# Patient Record
Sex: MALE | Employment: OTHER | URBAN - METROPOLITAN AREA
[De-identification: names, ages, dates, MRNs, and addresses within clinical notes are randomized per-mention and may not be internally consistent; named-entity substitution may affect disease eponyms.]

---

## 2019-01-01 ENCOUNTER — ANESTHESIA EVENT (OUTPATIENT)
Dept: SURGERY | Age: 84
DRG: 470 | End: 2019-01-01
Payer: MEDICARE

## 2019-01-01 ENCOUNTER — APPOINTMENT (OUTPATIENT)
Dept: GENERAL RADIOLOGY | Age: 84
DRG: 466 | End: 2019-01-01
Attending: STUDENT IN AN ORGANIZED HEALTH CARE EDUCATION/TRAINING PROGRAM
Payer: MEDICARE

## 2019-01-01 ENCOUNTER — HOME CARE VISIT (OUTPATIENT)
Dept: HOSPICE | Facility: HOSPICE | Age: 84
End: 2019-01-01
Payer: MEDICARE

## 2019-01-01 ENCOUNTER — APPOINTMENT (OUTPATIENT)
Dept: GENERAL RADIOLOGY | Age: 84
DRG: 466 | End: 2019-01-01
Attending: EMERGENCY MEDICINE
Payer: MEDICARE

## 2019-01-01 ENCOUNTER — APPOINTMENT (OUTPATIENT)
Dept: ULTRASOUND IMAGING | Age: 84
DRG: 466 | End: 2019-01-01
Attending: INTERNAL MEDICINE
Payer: MEDICARE

## 2019-01-01 ENCOUNTER — APPOINTMENT (OUTPATIENT)
Dept: GENERAL RADIOLOGY | Age: 84
DRG: 470 | End: 2019-01-01
Attending: ORTHOPAEDIC SURGERY
Payer: MEDICARE

## 2019-01-01 ENCOUNTER — HOSPITAL ENCOUNTER (INPATIENT)
Age: 84
LOS: 13 days | Discharge: NURSING, REHAB OR LONG-TERM CARE | DRG: 466 | End: 2019-10-19
Attending: EMERGENCY MEDICINE | Admitting: STUDENT IN AN ORGANIZED HEALTH CARE EDUCATION/TRAINING PROGRAM
Payer: MEDICARE

## 2019-01-01 ENCOUNTER — HOSPITAL ENCOUNTER (INPATIENT)
Age: 84
LOS: 7 days | Discharge: NURSING, REHAB OR LONG-TERM CARE | DRG: 470 | End: 2019-09-10
Attending: EMERGENCY MEDICINE | Admitting: FAMILY MEDICINE
Payer: MEDICARE

## 2019-01-01 ENCOUNTER — APPOINTMENT (OUTPATIENT)
Dept: GENERAL RADIOLOGY | Age: 84
DRG: 470 | End: 2019-01-01
Attending: PHYSICIAN ASSISTANT
Payer: MEDICARE

## 2019-01-01 ENCOUNTER — HOSPICE ADMISSION (OUTPATIENT)
Dept: HOSPICE | Facility: HOSPICE | Age: 84
End: 2019-01-01
Payer: MEDICARE

## 2019-01-01 ENCOUNTER — APPOINTMENT (OUTPATIENT)
Dept: GENERAL RADIOLOGY | Age: 84
DRG: 466 | End: 2019-01-01
Attending: PHYSICIAN ASSISTANT
Payer: MEDICARE

## 2019-01-01 ENCOUNTER — ANESTHESIA (OUTPATIENT)
Dept: SURGERY | Age: 84
DRG: 470 | End: 2019-01-01
Payer: MEDICARE

## 2019-01-01 ENCOUNTER — HOSPITAL ENCOUNTER (INPATIENT)
Age: 84
LOS: 3 days | DRG: 951 | End: 2019-10-22
Attending: FAMILY MEDICINE | Admitting: FAMILY MEDICINE
Payer: OTHER MISCELLANEOUS

## 2019-01-01 ENCOUNTER — APPOINTMENT (OUTPATIENT)
Dept: CT IMAGING | Age: 84
DRG: 466 | End: 2019-01-01
Attending: PHYSICIAN ASSISTANT
Payer: MEDICARE

## 2019-01-01 ENCOUNTER — ANESTHESIA (OUTPATIENT)
Dept: SURGERY | Age: 84
DRG: 466 | End: 2019-01-01
Payer: MEDICARE

## 2019-01-01 ENCOUNTER — APPOINTMENT (OUTPATIENT)
Dept: GENERAL RADIOLOGY | Age: 84
DRG: 466 | End: 2019-01-01
Attending: ORTHOPAEDIC SURGERY
Payer: MEDICARE

## 2019-01-01 ENCOUNTER — ANESTHESIA EVENT (OUTPATIENT)
Dept: SURGERY | Age: 84
DRG: 466 | End: 2019-01-01
Payer: MEDICARE

## 2019-01-01 ENCOUNTER — APPOINTMENT (OUTPATIENT)
Dept: GENERAL RADIOLOGY | Age: 84
DRG: 466 | End: 2019-01-01
Attending: INTERNAL MEDICINE
Payer: MEDICARE

## 2019-01-01 ENCOUNTER — APPOINTMENT (OUTPATIENT)
Dept: NON INVASIVE DIAGNOSTICS | Age: 84
DRG: 466 | End: 2019-01-01
Attending: STUDENT IN AN ORGANIZED HEALTH CARE EDUCATION/TRAINING PROGRAM
Payer: MEDICARE

## 2019-01-01 ENCOUNTER — APPOINTMENT (OUTPATIENT)
Dept: GENERAL RADIOLOGY | Age: 84
DRG: 470 | End: 2019-01-01
Attending: EMERGENCY MEDICINE
Payer: MEDICARE

## 2019-01-01 ENCOUNTER — APPOINTMENT (OUTPATIENT)
Dept: CT IMAGING | Age: 84
DRG: 470 | End: 2019-01-01
Attending: EMERGENCY MEDICINE
Payer: MEDICARE

## 2019-01-01 ENCOUNTER — APPOINTMENT (OUTPATIENT)
Dept: CT IMAGING | Age: 84
DRG: 466 | End: 2019-01-01
Attending: STUDENT IN AN ORGANIZED HEALTH CARE EDUCATION/TRAINING PROGRAM
Payer: MEDICARE

## 2019-01-01 VITALS
OXYGEN SATURATION: 100 % | SYSTOLIC BLOOD PRESSURE: 161 MMHG | DIASTOLIC BLOOD PRESSURE: 73 MMHG | TEMPERATURE: 97.7 F | HEART RATE: 91 BPM | RESPIRATION RATE: 20 BRPM

## 2019-01-01 VITALS
WEIGHT: 136.2 LBS | BODY MASS INDEX: 19.5 KG/M2 | HEART RATE: 75 BPM | TEMPERATURE: 98.7 F | HEIGHT: 70 IN | RESPIRATION RATE: 16 BRPM | SYSTOLIC BLOOD PRESSURE: 139 MMHG | OXYGEN SATURATION: 98 % | DIASTOLIC BLOOD PRESSURE: 66 MMHG

## 2019-01-01 VITALS
HEIGHT: 70 IN | BODY MASS INDEX: 19.41 KG/M2 | DIASTOLIC BLOOD PRESSURE: 92 MMHG | TEMPERATURE: 98.6 F | SYSTOLIC BLOOD PRESSURE: 202 MMHG | OXYGEN SATURATION: 96 % | HEART RATE: 80 BPM | RESPIRATION RATE: 20 BRPM | WEIGHT: 135.6 LBS

## 2019-01-01 DIAGNOSIS — S72.002A CLOSED FRACTURE OF LEFT HIP, INITIAL ENCOUNTER (HCC): ICD-10-CM
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
## 2019-01-01 DIAGNOSIS — J18.9 PNEUMONIA OF LEFT LOWER LOBE DUE TO INFECTIOUS ORGANISM: Primary | ICD-10-CM

## 2019-01-01 DIAGNOSIS — R53.83 LETHARGY: ICD-10-CM

## 2019-01-01 DIAGNOSIS — Z71.89 GOALS OF CARE, COUNSELING/DISCUSSION: ICD-10-CM

## 2019-01-01 DIAGNOSIS — M25.552 CHRONIC LEFT HIP PAIN: ICD-10-CM

## 2019-01-01 DIAGNOSIS — I44.0 FIRST DEGREE AV BLOCK: ICD-10-CM

## 2019-01-01 DIAGNOSIS — G89.29 CHRONIC LEFT HIP PAIN: ICD-10-CM

## 2019-01-01 DIAGNOSIS — R50.9 FEVER, UNSPECIFIED FEVER CAUSE: ICD-10-CM

## 2019-01-01 DIAGNOSIS — S72.002A FRACTURE OF UNSPECIFIED PART OF NECK OF LEFT FEMUR, INITIAL ENCOUNTER FOR CLOSED FRACTURE (HCC): ICD-10-CM

## 2019-01-01 DIAGNOSIS — R53.81 DEBILITY: ICD-10-CM

## 2019-01-01 LAB
ABO + RH BLD: NORMAL
ABO + RH BLD: NORMAL
ALBUMIN SERPL-MCNC: 2.7 G/DL (ref 3.5–5)
ALBUMIN SERPL-MCNC: 3.5 G/DL (ref 3.5–5)
ALBUMIN/GLOB SERPL: 0.7 {RATIO} (ref 1.1–2.2)
ALBUMIN/GLOB SERPL: 1.1 {RATIO} (ref 1.1–2.2)
ALP SERPL-CCNC: 156 U/L (ref 45–117)
ALP SERPL-CCNC: 80 U/L (ref 45–117)
ALT SERPL-CCNC: 22 U/L (ref 12–78)
ALT SERPL-CCNC: 23 U/L (ref 12–78)
ANION GAP BLD CALC-SCNC: 13 MMOL/L (ref 10–20)
ANION GAP SERPL CALC-SCNC: 0 MMOL/L (ref 5–15)
ANION GAP SERPL CALC-SCNC: 10 MMOL/L (ref 5–15)
ANION GAP SERPL CALC-SCNC: 2 MMOL/L (ref 5–15)
ANION GAP SERPL CALC-SCNC: 3 MMOL/L (ref 5–15)
ANION GAP SERPL CALC-SCNC: 3 MMOL/L (ref 5–15)
ANION GAP SERPL CALC-SCNC: 4 MMOL/L (ref 5–15)
ANION GAP SERPL CALC-SCNC: 5 MMOL/L (ref 5–15)
ANION GAP SERPL CALC-SCNC: 6 MMOL/L (ref 5–15)
ANION GAP SERPL CALC-SCNC: 6 MMOL/L (ref 5–15)
ANION GAP SERPL CALC-SCNC: 7 MMOL/L (ref 5–15)
ANION GAP SERPL CALC-SCNC: 9 MMOL/L (ref 5–15)
APPEARANCE UR: CLEAR
AST SERPL-CCNC: 24 U/L (ref 15–37)
AST SERPL-CCNC: 26 U/L (ref 15–37)
ATRIAL RATE: 69 BPM
ATRIAL RATE: 93 BPM
BACTERIA SPEC CULT: ABNORMAL
BACTERIA SPEC CULT: NORMAL
BACTERIA URNS QL MICRO: NEGATIVE /HPF
BASOPHILS # BLD: 0 K/UL (ref 0–0.1)
BASOPHILS NFR BLD: 0 % (ref 0–1)
BILIRUB SERPL-MCNC: 1 MG/DL (ref 0.2–1)
BILIRUB SERPL-MCNC: 1.1 MG/DL (ref 0.2–1)
BILIRUB UR QL: NEGATIVE
BLD PROD TYP BPU: NORMAL
BLOOD GROUP ANTIBODIES SERPL: NORMAL
BLOOD GROUP ANTIBODIES SERPL: NORMAL
BPU ID: NORMAL
BUN BLD-MCNC: 32 MG/DL (ref 9–20)
BUN SERPL-MCNC: 23 MG/DL (ref 6–20)
BUN SERPL-MCNC: 24 MG/DL (ref 6–20)
BUN SERPL-MCNC: 24 MG/DL (ref 6–20)
BUN SERPL-MCNC: 26 MG/DL (ref 6–20)
BUN SERPL-MCNC: 26 MG/DL (ref 6–20)
BUN SERPL-MCNC: 30 MG/DL (ref 6–20)
BUN SERPL-MCNC: 31 MG/DL (ref 6–20)
BUN SERPL-MCNC: 32 MG/DL (ref 6–20)
BUN SERPL-MCNC: 34 MG/DL (ref 6–20)
BUN SERPL-MCNC: 35 MG/DL (ref 6–20)
BUN SERPL-MCNC: 37 MG/DL (ref 6–20)
BUN SERPL-MCNC: 37 MG/DL (ref 6–20)
BUN SERPL-MCNC: 38 MG/DL (ref 6–20)
BUN/CREAT SERPL: 15 (ref 12–20)
BUN/CREAT SERPL: 15 (ref 12–20)
BUN/CREAT SERPL: 16 (ref 12–20)
BUN/CREAT SERPL: 17 (ref 12–20)
BUN/CREAT SERPL: 17 (ref 12–20)
BUN/CREAT SERPL: 18 (ref 12–20)
BUN/CREAT SERPL: 19 (ref 12–20)
BUN/CREAT SERPL: 22 (ref 12–20)
BUN/CREAT SERPL: 23 (ref 12–20)
BUN/CREAT SERPL: 23 (ref 12–20)
BUN/CREAT SERPL: 26 (ref 12–20)
BUN/CREAT SERPL: 27 (ref 12–20)
BUN/CREAT SERPL: 27 (ref 12–20)
BUN/CREAT SERPL: 34 (ref 12–20)
BUN/CREAT SERPL: 35 (ref 12–20)
CA-I BLD-MCNC: 1.16 MMOL/L (ref 1.12–1.32)
CALCIUM SERPL-MCNC: 8.3 MG/DL (ref 8.5–10.1)
CALCIUM SERPL-MCNC: 8.4 MG/DL (ref 8.5–10.1)
CALCIUM SERPL-MCNC: 8.4 MG/DL (ref 8.5–10.1)
CALCIUM SERPL-MCNC: 8.5 MG/DL (ref 8.5–10.1)
CALCIUM SERPL-MCNC: 8.6 MG/DL (ref 8.5–10.1)
CALCIUM SERPL-MCNC: 8.7 MG/DL (ref 8.5–10.1)
CALCIUM SERPL-MCNC: 8.8 MG/DL (ref 8.5–10.1)
CALCIUM SERPL-MCNC: 8.8 MG/DL (ref 8.5–10.1)
CALCIUM SERPL-MCNC: 8.9 MG/DL (ref 8.5–10.1)
CALCIUM SERPL-MCNC: 9.1 MG/DL (ref 8.5–10.1)
CALCIUM SERPL-MCNC: 9.2 MG/DL (ref 8.5–10.1)
CALCIUM SERPL-MCNC: 9.4 MG/DL (ref 8.5–10.1)
CALCIUM SERPL-MCNC: 9.7 MG/DL (ref 8.5–10.1)
CALCULATED P AXIS, ECG09: 10 DEGREES
CALCULATED P AXIS, ECG09: 78 DEGREES
CALCULATED R AXIS, ECG10: 35 DEGREES
CALCULATED R AXIS, ECG10: 77 DEGREES
CALCULATED T AXIS, ECG11: 102 DEGREES
CALCULATED T AXIS, ECG11: 133 DEGREES
CHLORIDE BLD-SCNC: 104 MMOL/L (ref 98–107)
CHLORIDE SERPL-SCNC: 107 MMOL/L (ref 97–108)
CHLORIDE SERPL-SCNC: 108 MMOL/L (ref 97–108)
CHLORIDE SERPL-SCNC: 109 MMOL/L (ref 97–108)
CHLORIDE SERPL-SCNC: 110 MMOL/L (ref 97–108)
CHLORIDE SERPL-SCNC: 110 MMOL/L (ref 97–108)
CHLORIDE SERPL-SCNC: 111 MMOL/L (ref 97–108)
CHLORIDE SERPL-SCNC: 111 MMOL/L (ref 97–108)
CK SERPL-CCNC: 34 U/L (ref 39–308)
CO2 BLD-SCNC: 27 MMOL/L (ref 21–32)
CO2 SERPL-SCNC: 24 MMOL/L (ref 21–32)
CO2 SERPL-SCNC: 25 MMOL/L (ref 21–32)
CO2 SERPL-SCNC: 26 MMOL/L (ref 21–32)
CO2 SERPL-SCNC: 27 MMOL/L (ref 21–32)
CO2 SERPL-SCNC: 29 MMOL/L (ref 21–32)
CO2 SERPL-SCNC: 30 MMOL/L (ref 21–32)
COLOR UR: ABNORMAL
COMMENT, HOLDF: NORMAL
CREAT BLD-MCNC: 1.2 MG/DL (ref 0.6–1.3)
CREAT SERPL-MCNC: 1.06 MG/DL (ref 0.7–1.3)
CREAT SERPL-MCNC: 1.09 MG/DL (ref 0.7–1.3)
CREAT SERPL-MCNC: 1.29 MG/DL (ref 0.7–1.3)
CREAT SERPL-MCNC: 1.31 MG/DL (ref 0.7–1.3)
CREAT SERPL-MCNC: 1.31 MG/DL (ref 0.7–1.3)
CREAT SERPL-MCNC: 1.34 MG/DL (ref 0.7–1.3)
CREAT SERPL-MCNC: 1.34 MG/DL (ref 0.7–1.3)
CREAT SERPL-MCNC: 1.38 MG/DL (ref 0.7–1.3)
CREAT SERPL-MCNC: 1.39 MG/DL (ref 0.7–1.3)
CREAT SERPL-MCNC: 1.43 MG/DL (ref 0.7–1.3)
CREAT SERPL-MCNC: 1.43 MG/DL (ref 0.7–1.3)
CREAT SERPL-MCNC: 1.48 MG/DL (ref 0.7–1.3)
CREAT SERPL-MCNC: 1.51 MG/DL (ref 0.7–1.3)
CREAT SERPL-MCNC: 1.54 MG/DL (ref 0.7–1.3)
CREAT SERPL-MCNC: 1.56 MG/DL (ref 0.7–1.3)
CROSSMATCH RESULT,%XM: NORMAL
DIAGNOSIS, 93000: NORMAL
DIAGNOSIS, 93000: NORMAL
DIFFERENTIAL METHOD BLD: ABNORMAL
ECHO AO ROOT DIAM: 3.41 CM
ECHO AV MEAN GRADIENT: 26.2 MMHG
ECHO AV PEAK GRADIENT: 49.8 MMHG
ECHO AV PEAK VELOCITY: 352.88 CM/S
ECHO AV REGURGITANT PHT: 503.5 CM
ECHO AV VTI: 84.15 CM
ECHO LA AREA 4C: 28.3 CM2
ECHO LA MAJOR AXIS: 5.42 CM
ECHO LA TO AORTIC ROOT RATIO: 1.59
ECHO LA VOL 2C: 93.72 ML (ref 18–58)
ECHO LA VOL 4C: 103.94 ML (ref 18–58)
ECHO LA VOL BP: 107.18 ML (ref 18–58)
ECHO LA VOL/BSA BIPLANE: 60.49 ML/M2 (ref 16–28)
ECHO LA VOLUME INDEX A2C: 52.9 ML/M2 (ref 16–28)
ECHO LA VOLUME INDEX A4C: 58.66 ML/M2 (ref 16–28)
ECHO LV E' LATERAL VELOCITY: 5.16 CM/S
ECHO LV E' SEPTAL VELOCITY: 3.98 CM/S
ECHO LV INTERNAL DIMENSION DIASTOLIC: 5.39 CM (ref 4.2–5.9)
ECHO LV INTERNAL DIMENSION SYSTOLIC: 4 CM
ECHO LV IVSD: 0.88 CM (ref 0.6–1)
ECHO LV MASS 2D: 212.6 G (ref 88–224)
ECHO LV MASS INDEX 2D: 120 G/M2 (ref 49–115)
ECHO LV POSTERIOR WALL DIASTOLIC: 0.94 CM (ref 0.6–1)
ECHO LVOT DIAM: 1.65 CM
ECHO MV A VELOCITY: 81.05 CM/S
ECHO MV AREA PHT: 3.5 CM2
ECHO MV E DECELERATION TIME (DT): 216.6 MS
ECHO MV E VELOCITY: 106.57 CM/S
ECHO MV E/A RATIO: 1.31
ECHO MV E/E' LATERAL: 20.65
ECHO MV E/E' RATIO (AVERAGED): 23.71
ECHO MV E/E' SEPTAL: 26.78
ECHO MV PRESSURE HALF TIME (PHT): 62.8 MS
ECHO PV MAX VELOCITY: 70.91 CM/S
ECHO PV PEAK GRADIENT: 2 MMHG
ECHO RV TAPSE: 1.45 CM (ref 1.5–2)
ECHO TV REGURGITANT MAX VELOCITY: 302.13 CM/S
ECHO TV REGURGITANT PEAK GRADIENT: 36.5 MMHG
EOSINOPHIL # BLD: 0.1 K/UL (ref 0–0.4)
EOSINOPHIL # BLD: 0.3 K/UL (ref 0–0.4)
EOSINOPHIL # BLD: 0.4 K/UL (ref 0–0.4)
EOSINOPHIL # BLD: 0.4 K/UL (ref 0–0.4)
EOSINOPHIL # BLD: 0.6 K/UL (ref 0–0.4)
EOSINOPHIL # BLD: 0.8 K/UL (ref 0–0.4)
EOSINOPHIL # BLD: 0.9 K/UL (ref 0–0.4)
EOSINOPHIL NFR BLD: 1 % (ref 0–7)
EOSINOPHIL NFR BLD: 2 % (ref 0–7)
EOSINOPHIL NFR BLD: 3 % (ref 0–7)
EOSINOPHIL NFR BLD: 3 % (ref 0–7)
EOSINOPHIL NFR BLD: 5 % (ref 0–7)
EOSINOPHIL NFR BLD: 6 % (ref 0–7)
EOSINOPHIL NFR BLD: 7 % (ref 0–7)
EOSINOPHIL NFR BLD: 8 % (ref 0–7)
EPITH CASTS URNS QL MICRO: ABNORMAL /LPF
ERYTHROCYTE [DISTWIDTH] IN BLOOD BY AUTOMATED COUNT: 12.5 % (ref 11.5–14.5)
ERYTHROCYTE [DISTWIDTH] IN BLOOD BY AUTOMATED COUNT: 12.6 % (ref 11.5–14.5)
ERYTHROCYTE [DISTWIDTH] IN BLOOD BY AUTOMATED COUNT: 12.6 % (ref 11.5–14.5)
ERYTHROCYTE [DISTWIDTH] IN BLOOD BY AUTOMATED COUNT: 13 % (ref 11.5–14.5)
ERYTHROCYTE [DISTWIDTH] IN BLOOD BY AUTOMATED COUNT: 13.1 % (ref 11.5–14.5)
ERYTHROCYTE [DISTWIDTH] IN BLOOD BY AUTOMATED COUNT: 13.1 % (ref 11.5–14.5)
ERYTHROCYTE [DISTWIDTH] IN BLOOD BY AUTOMATED COUNT: 13.2 % (ref 11.5–14.5)
ERYTHROCYTE [DISTWIDTH] IN BLOOD BY AUTOMATED COUNT: 13.2 % (ref 11.5–14.5)
ERYTHROCYTE [DISTWIDTH] IN BLOOD BY AUTOMATED COUNT: 13.3 % (ref 11.5–14.5)
ERYTHROCYTE [DISTWIDTH] IN BLOOD BY AUTOMATED COUNT: 14.2 % (ref 11.5–14.5)
ERYTHROCYTE [DISTWIDTH] IN BLOOD BY AUTOMATED COUNT: 14.5 % (ref 11.5–14.5)
GLOBULIN SER CALC-MCNC: 3.3 G/DL (ref 2–4)
GLOBULIN SER CALC-MCNC: 3.9 G/DL (ref 2–4)
GLUCOSE BLD STRIP.AUTO-MCNC: 104 MG/DL (ref 65–100)
GLUCOSE BLD STRIP.AUTO-MCNC: 128 MG/DL (ref 65–100)
GLUCOSE BLD STRIP.AUTO-MCNC: 139 MG/DL (ref 65–100)
GLUCOSE BLD STRIP.AUTO-MCNC: 159 MG/DL (ref 65–100)
GLUCOSE BLD STRIP.AUTO-MCNC: 91 MG/DL (ref 65–100)
GLUCOSE BLD STRIP.AUTO-MCNC: 94 MG/DL (ref 65–100)
GLUCOSE BLD-MCNC: 109 MG/DL (ref 65–100)
GLUCOSE SERPL-MCNC: 106 MG/DL (ref 65–100)
GLUCOSE SERPL-MCNC: 106 MG/DL (ref 65–100)
GLUCOSE SERPL-MCNC: 107 MG/DL (ref 65–100)
GLUCOSE SERPL-MCNC: 108 MG/DL (ref 65–100)
GLUCOSE SERPL-MCNC: 108 MG/DL (ref 65–100)
GLUCOSE SERPL-MCNC: 110 MG/DL (ref 65–100)
GLUCOSE SERPL-MCNC: 114 MG/DL (ref 65–100)
GLUCOSE SERPL-MCNC: 118 MG/DL (ref 65–100)
GLUCOSE SERPL-MCNC: 123 MG/DL (ref 65–100)
GLUCOSE SERPL-MCNC: 135 MG/DL (ref 65–100)
GLUCOSE SERPL-MCNC: 91 MG/DL (ref 65–100)
GLUCOSE SERPL-MCNC: 91 MG/DL (ref 65–100)
GLUCOSE SERPL-MCNC: 94 MG/DL (ref 65–100)
GLUCOSE SERPL-MCNC: 96 MG/DL (ref 65–100)
GLUCOSE SERPL-MCNC: 98 MG/DL (ref 65–100)
GLUCOSE UR STRIP.AUTO-MCNC: NEGATIVE MG/DL
GRAM STN SPEC: ABNORMAL
GRAM STN SPEC: ABNORMAL
HCT VFR BLD AUTO: 24.8 % (ref 36.6–50.3)
HCT VFR BLD AUTO: 26.7 % (ref 36.6–50.3)
HCT VFR BLD AUTO: 27 % (ref 36.6–50.3)
HCT VFR BLD AUTO: 27.7 % (ref 36.6–50.3)
HCT VFR BLD AUTO: 27.9 % (ref 36.6–50.3)
HCT VFR BLD AUTO: 28 % (ref 36.6–50.3)
HCT VFR BLD AUTO: 28.8 % (ref 36.6–50.3)
HCT VFR BLD AUTO: 30.7 % (ref 36.6–50.3)
HCT VFR BLD AUTO: 31.8 % (ref 36.6–50.3)
HCT VFR BLD AUTO: 35.8 % (ref 36.6–50.3)
HCT VFR BLD AUTO: 39.9 % (ref 36.6–50.3)
HCT VFR BLD CALC: 23 % (ref 36.6–50.3)
HGB BLD-MCNC: 10.2 G/DL (ref 12.1–17)
HGB BLD-MCNC: 10.3 G/DL (ref 12.1–17)
HGB BLD-MCNC: 10.3 G/DL (ref 12.1–17)
HGB BLD-MCNC: 11.5 G/DL (ref 12.1–17)
HGB BLD-MCNC: 12.7 G/DL (ref 12.1–17)
HGB BLD-MCNC: 7.3 G/DL (ref 12.1–17)
HGB BLD-MCNC: 7.9 G/DL (ref 12.1–17)
HGB BLD-MCNC: 8.4 G/DL (ref 12.1–17)
HGB BLD-MCNC: 8.5 G/DL (ref 12.1–17)
HGB BLD-MCNC: 8.8 G/DL (ref 12.1–17)
HGB BLD-MCNC: 8.8 G/DL (ref 12.1–17)
HGB BLD-MCNC: 8.9 G/DL (ref 12.1–17)
HGB BLD-MCNC: 8.9 G/DL (ref 12.1–17)
HGB BLD-MCNC: 9.2 G/DL (ref 12.1–17)
HGB BLD-MCNC: 9.6 G/DL (ref 12.1–17)
HGB UR QL STRIP: NEGATIVE
HYALINE CASTS URNS QL MICRO: ABNORMAL /LPF (ref 0–5)
HYALINE CASTS URNS QL MICRO: ABNORMAL /LPF (ref 0–5)
IMM GRANULOCYTES # BLD AUTO: 0 K/UL (ref 0–0.04)
IMM GRANULOCYTES # BLD AUTO: 0.1 K/UL (ref 0–0.04)
IMM GRANULOCYTES NFR BLD AUTO: 0 % (ref 0–0.5)
IMM GRANULOCYTES NFR BLD AUTO: 1 % (ref 0–0.5)
INR PPP: 1.2 (ref 0.9–1.1)
KETONES UR QL STRIP.AUTO: NEGATIVE MG/DL
LACTATE SERPL-SCNC: 0.8 MMOL/L (ref 0.4–2)
LEUKOCYTE ESTERASE UR QL STRIP.AUTO: NEGATIVE
LYMPHOCYTES # BLD: 1.5 K/UL (ref 0.8–3.5)
LYMPHOCYTES # BLD: 2 K/UL (ref 0.8–3.5)
LYMPHOCYTES # BLD: 2.1 K/UL (ref 0.8–3.5)
LYMPHOCYTES # BLD: 2.1 K/UL (ref 0.8–3.5)
LYMPHOCYTES # BLD: 2.2 K/UL (ref 0.8–3.5)
LYMPHOCYTES # BLD: 2.2 K/UL (ref 0.8–3.5)
LYMPHOCYTES # BLD: 2.3 K/UL (ref 0.8–3.5)
LYMPHOCYTES # BLD: 2.4 K/UL (ref 0.8–3.5)
LYMPHOCYTES # BLD: 2.5 K/UL (ref 0.8–3.5)
LYMPHOCYTES # BLD: 2.5 K/UL (ref 0.8–3.5)
LYMPHOCYTES NFR BLD: 12 % (ref 12–49)
LYMPHOCYTES NFR BLD: 17 % (ref 12–49)
LYMPHOCYTES NFR BLD: 19 % (ref 12–49)
LYMPHOCYTES NFR BLD: 21 % (ref 12–49)
LYMPHOCYTES NFR BLD: 22 % (ref 12–49)
LYMPHOCYTES NFR BLD: 24 % (ref 12–49)
LYMPHOCYTES NFR BLD: 26 % (ref 12–49)
LYMPHOCYTES NFR BLD: 31 % (ref 12–49)
LYMPHOCYTES NFR BLD: 31 % (ref 12–49)
LYMPHOCYTES NFR BLD: 35 % (ref 12–49)
MAGNESIUM SERPL-MCNC: 1.8 MG/DL (ref 1.6–2.4)
MAGNESIUM SERPL-MCNC: 1.8 MG/DL (ref 1.6–2.4)
MAGNESIUM SERPL-MCNC: 1.9 MG/DL (ref 1.6–2.4)
MAGNESIUM SERPL-MCNC: 2 MG/DL (ref 1.6–2.4)
MCH RBC QN AUTO: 29.2 PG (ref 26–34)
MCH RBC QN AUTO: 29.3 PG (ref 26–34)
MCH RBC QN AUTO: 29.4 PG (ref 26–34)
MCH RBC QN AUTO: 29.5 PG (ref 26–34)
MCH RBC QN AUTO: 29.6 PG (ref 26–34)
MCH RBC QN AUTO: 29.8 PG (ref 26–34)
MCH RBC QN AUTO: 29.8 PG (ref 26–34)
MCH RBC QN AUTO: 29.9 PG (ref 26–34)
MCH RBC QN AUTO: 30.6 PG (ref 26–34)
MCH RBC QN AUTO: 30.8 PG (ref 26–34)
MCH RBC QN AUTO: 31 PG (ref 26–34)
MCHC RBC AUTO-ENTMCNC: 31.1 G/DL (ref 30–36.5)
MCHC RBC AUTO-ENTMCNC: 31.3 G/DL (ref 30–36.5)
MCHC RBC AUTO-ENTMCNC: 31.4 G/DL (ref 30–36.5)
MCHC RBC AUTO-ENTMCNC: 31.8 G/DL (ref 30–36.5)
MCHC RBC AUTO-ENTMCNC: 31.9 G/DL (ref 30–36.5)
MCHC RBC AUTO-ENTMCNC: 32.1 G/DL (ref 30–36.5)
MCHC RBC AUTO-ENTMCNC: 32.1 G/DL (ref 30–36.5)
MCV RBC AUTO: 91.8 FL (ref 80–99)
MCV RBC AUTO: 91.8 FL (ref 80–99)
MCV RBC AUTO: 92.6 FL (ref 80–99)
MCV RBC AUTO: 92.9 FL (ref 80–99)
MCV RBC AUTO: 93.5 FL (ref 80–99)
MCV RBC AUTO: 94.7 FL (ref 80–99)
MCV RBC AUTO: 94.9 FL (ref 80–99)
MCV RBC AUTO: 95.3 FL (ref 80–99)
MCV RBC AUTO: 95.5 FL (ref 80–99)
MCV RBC AUTO: 96.5 FL (ref 80–99)
MCV RBC AUTO: 96.8 FL (ref 80–99)
MONOCYTES # BLD: 0.5 K/UL (ref 0–1)
MONOCYTES # BLD: 0.6 K/UL (ref 0–1)
MONOCYTES # BLD: 0.7 K/UL (ref 0–1)
MONOCYTES # BLD: 0.8 K/UL (ref 0–1)
MONOCYTES # BLD: 0.9 K/UL (ref 0–1)
MONOCYTES # BLD: 0.9 K/UL (ref 0–1)
MONOCYTES # BLD: 1.1 K/UL (ref 0–1)
MONOCYTES # BLD: 1.1 K/UL (ref 0–1)
MONOCYTES NFR BLD: 10 % (ref 5–13)
MONOCYTES NFR BLD: 10 % (ref 5–13)
MONOCYTES NFR BLD: 11 % (ref 5–13)
MONOCYTES NFR BLD: 7 % (ref 5–13)
MONOCYTES NFR BLD: 8 % (ref 5–13)
MONOCYTES NFR BLD: 9 % (ref 5–13)
NEUTS SEG # BLD: 3.5 K/UL (ref 1.8–8)
NEUTS SEG # BLD: 3.6 K/UL (ref 1.8–8)
NEUTS SEG # BLD: 4.2 K/UL (ref 1.8–8)
NEUTS SEG # BLD: 4.8 K/UL (ref 1.8–8)
NEUTS SEG # BLD: 5.9 K/UL (ref 1.8–8)
NEUTS SEG # BLD: 6.6 K/UL (ref 1.8–8)
NEUTS SEG # BLD: 7.7 K/UL (ref 1.8–8)
NEUTS SEG # BLD: 8 K/UL (ref 1.8–8)
NEUTS SEG # BLD: 8.6 K/UL (ref 1.8–8)
NEUTS SEG # BLD: 9.9 K/UL (ref 1.8–8)
NEUTS SEG NFR BLD: 50 % (ref 32–75)
NEUTS SEG NFR BLD: 53 % (ref 32–75)
NEUTS SEG NFR BLD: 53 % (ref 32–75)
NEUTS SEG NFR BLD: 62 % (ref 32–75)
NEUTS SEG NFR BLD: 63 % (ref 32–75)
NEUTS SEG NFR BLD: 64 % (ref 32–75)
NEUTS SEG NFR BLD: 65 % (ref 32–75)
NEUTS SEG NFR BLD: 69 % (ref 32–75)
NEUTS SEG NFR BLD: 71 % (ref 32–75)
NEUTS SEG NFR BLD: 76 % (ref 32–75)
NITRITE UR QL STRIP.AUTO: NEGATIVE
NRBC # BLD: 0 K/UL (ref 0–0.01)
NRBC BLD-RTO: 0 PER 100 WBC
P-R INTERVAL, ECG05: 174 MS
P-R INTERVAL, ECG05: 240 MS
PH UR STRIP: 6.5 [PH] (ref 5–8)
PH UR STRIP: 7 [PH] (ref 5–8)
PH UR STRIP: 7.5 [PH] (ref 5–8)
PHOSPHATE SERPL-MCNC: 1.9 MG/DL (ref 2.6–4.7)
PHOSPHATE SERPL-MCNC: 2 MG/DL (ref 2.6–4.7)
PHOSPHATE SERPL-MCNC: 2.4 MG/DL (ref 2.6–4.7)
PHOSPHATE SERPL-MCNC: 2.5 MG/DL (ref 2.6–4.7)
PHOSPHATE SERPL-MCNC: 2.6 MG/DL (ref 2.6–4.7)
PHOSPHATE SERPL-MCNC: 2.7 MG/DL (ref 2.6–4.7)
PISA AR MAX VEL: 451.01 CM/S
PLATELET # BLD AUTO: 131 K/UL (ref 150–400)
PLATELET # BLD AUTO: 166 K/UL (ref 150–400)
PLATELET # BLD AUTO: 192 K/UL (ref 150–400)
PLATELET # BLD AUTO: 202 K/UL (ref 150–400)
PLATELET # BLD AUTO: 203 K/UL (ref 150–400)
PLATELET # BLD AUTO: 230 K/UL (ref 150–400)
PLATELET # BLD AUTO: 235 K/UL (ref 150–400)
PLATELET # BLD AUTO: 238 K/UL (ref 150–400)
PLATELET # BLD AUTO: 262 K/UL (ref 150–400)
PLATELET # BLD AUTO: 263 K/UL (ref 150–400)
PLATELET # BLD AUTO: 278 K/UL (ref 150–400)
PMV BLD AUTO: 10.2 FL (ref 8.9–12.9)
PMV BLD AUTO: 10.2 FL (ref 8.9–12.9)
PMV BLD AUTO: 10.3 FL (ref 8.9–12.9)
PMV BLD AUTO: 10.4 FL (ref 8.9–12.9)
PMV BLD AUTO: 10.5 FL (ref 8.9–12.9)
PMV BLD AUTO: 10.5 FL (ref 8.9–12.9)
PMV BLD AUTO: 10.8 FL (ref 8.9–12.9)
PMV BLD AUTO: 10.9 FL (ref 8.9–12.9)
PMV BLD AUTO: 11 FL (ref 8.9–12.9)
PMV BLD AUTO: 11.2 FL (ref 8.9–12.9)
PMV BLD AUTO: 12.6 FL (ref 8.9–12.9)
POTASSIUM BLD-SCNC: 4.2 MMOL/L (ref 3.5–5.1)
POTASSIUM SERPL-SCNC: 3.6 MMOL/L (ref 3.5–5.1)
POTASSIUM SERPL-SCNC: 3.7 MMOL/L (ref 3.5–5.1)
POTASSIUM SERPL-SCNC: 3.9 MMOL/L (ref 3.5–5.1)
POTASSIUM SERPL-SCNC: 4 MMOL/L (ref 3.5–5.1)
POTASSIUM SERPL-SCNC: 4.1 MMOL/L (ref 3.5–5.1)
POTASSIUM SERPL-SCNC: 4.1 MMOL/L (ref 3.5–5.1)
POTASSIUM SERPL-SCNC: 4.2 MMOL/L (ref 3.5–5.1)
POTASSIUM SERPL-SCNC: 4.2 MMOL/L (ref 3.5–5.1)
POTASSIUM SERPL-SCNC: 4.5 MMOL/L (ref 3.5–5.1)
POTASSIUM SERPL-SCNC: 4.5 MMOL/L (ref 3.5–5.1)
POTASSIUM SERPL-SCNC: 4.6 MMOL/L (ref 3.5–5.1)
POTASSIUM SERPL-SCNC: 4.7 MMOL/L (ref 3.5–5.1)
POTASSIUM SERPL-SCNC: 5.1 MMOL/L (ref 3.5–5.1)
PROT SERPL-MCNC: 6.6 G/DL (ref 6.4–8.2)
PROT SERPL-MCNC: 6.8 G/DL (ref 6.4–8.2)
PROT UR STRIP-MCNC: 30 MG/DL
PROT UR STRIP-MCNC: 30 MG/DL
PROT UR STRIP-MCNC: ABNORMAL MG/DL
PROTHROMBIN TIME: 12.2 SEC (ref 9–11.1)
Q-T INTERVAL, ECG07: 376 MS
Q-T INTERVAL, ECG07: 408 MS
QRS DURATION, ECG06: 108 MS
QRS DURATION, ECG06: 90 MS
QTC CALCULATION (BEZET), ECG08: 437 MS
QTC CALCULATION (BEZET), ECG08: 467 MS
RBC # BLD AUTO: 2.67 M/UL (ref 4.1–5.7)
RBC # BLD AUTO: 2.85 M/UL (ref 4.1–5.7)
RBC # BLD AUTO: 2.91 M/UL (ref 4.1–5.7)
RBC # BLD AUTO: 2.95 M/UL (ref 4.1–5.7)
RBC # BLD AUTO: 2.99 M/UL (ref 4.1–5.7)
RBC # BLD AUTO: 3.04 M/UL (ref 4.1–5.7)
RBC # BLD AUTO: 3.08 M/UL (ref 4.1–5.7)
RBC # BLD AUTO: 3.22 M/UL (ref 4.1–5.7)
RBC # BLD AUTO: 3.33 M/UL (ref 4.1–5.7)
RBC # BLD AUTO: 3.71 M/UL (ref 4.1–5.7)
RBC # BLD AUTO: 4.12 M/UL (ref 4.1–5.7)
RBC #/AREA URNS HPF: ABNORMAL /HPF (ref 0–5)
SAMPLES BEING HELD,HOLD: NORMAL
SERVICE CMNT-IMP: ABNORMAL
SERVICE CMNT-IMP: NORMAL
SODIUM BLD-SCNC: 138 MMOL/L (ref 136–145)
SODIUM SERPL-SCNC: 139 MMOL/L (ref 136–145)
SODIUM SERPL-SCNC: 140 MMOL/L (ref 136–145)
SODIUM SERPL-SCNC: 141 MMOL/L (ref 136–145)
SODIUM SERPL-SCNC: 141 MMOL/L (ref 136–145)
SODIUM SERPL-SCNC: 142 MMOL/L (ref 136–145)
SODIUM SERPL-SCNC: 143 MMOL/L (ref 136–145)
SODIUM SERPL-SCNC: 144 MMOL/L (ref 136–145)
SODIUM SERPL-SCNC: 145 MMOL/L (ref 136–145)
SP GR UR REFRACTOMETRY: 1.01 (ref 1–1.03)
SP GR UR REFRACTOMETRY: 1.01 (ref 1–1.03)
SP GR UR REFRACTOMETRY: 1.02 (ref 1–1.03)
SPECIMEN EXP DATE BLD: NORMAL
SPECIMEN EXP DATE BLD: NORMAL
STATUS OF UNIT,%ST: NORMAL
TROPONIN I SERPL-MCNC: 0.08 NG/ML
UA: UC IF INDICATED,UAUC: ABNORMAL
UNIT DIVISION, %UDIV: 0
UR CULT HOLD, URHOLD: NORMAL
UR CULT HOLD, URHOLD: NORMAL
UROBILINOGEN UR QL STRIP.AUTO: 0.2 EU/DL (ref 0.2–1)
UROBILINOGEN UR QL STRIP.AUTO: 1 EU/DL (ref 0.2–1)
UROBILINOGEN UR QL STRIP.AUTO: 1 EU/DL (ref 0.2–1)
VENTRICULAR RATE, ECG03: 69 BPM
VENTRICULAR RATE, ECG03: 93 BPM
WBC # BLD AUTO: 10.3 K/UL (ref 4.1–11.1)
WBC # BLD AUTO: 10.5 K/UL (ref 4.1–11.1)
WBC # BLD AUTO: 11.8 K/UL (ref 4.1–11.1)
WBC # BLD AUTO: 11.8 K/UL (ref 4.1–11.1)
WBC # BLD AUTO: 12.2 K/UL (ref 4.1–11.1)
WBC # BLD AUTO: 12.9 K/UL (ref 4.1–11.1)
WBC # BLD AUTO: 6.7 K/UL (ref 4.1–11.1)
WBC # BLD AUTO: 7.1 K/UL (ref 4.1–11.1)
WBC # BLD AUTO: 7.6 K/UL (ref 4.1–11.1)
WBC # BLD AUTO: 7.9 K/UL (ref 4.1–11.1)
WBC # BLD AUTO: 9.4 K/UL (ref 4.1–11.1)
WBC URNS QL MICRO: ABNORMAL /HPF (ref 0–4)

## 2019-01-01 PROCEDURE — 97168 OT RE-EVAL EST PLAN CARE: CPT

## 2019-01-01 PROCEDURE — 97530 THERAPEUTIC ACTIVITIES: CPT

## 2019-01-01 PROCEDURE — 74011250637 HC RX REV CODE- 250/637: Performed by: ORTHOPAEDIC SURGERY

## 2019-01-01 PROCEDURE — 97116 GAIT TRAINING THERAPY: CPT

## 2019-01-01 PROCEDURE — 94760 N-INVAS EAR/PLS OXIMETRY 1: CPT

## 2019-01-01 PROCEDURE — 87102 FUNGUS ISOLATION CULTURE: CPT

## 2019-01-01 PROCEDURE — 74011250637 HC RX REV CODE- 250/637: Performed by: EMERGENCY MEDICINE

## 2019-01-01 PROCEDURE — 74011250636 HC RX REV CODE- 250/636: Performed by: NURSE PRACTITIONER

## 2019-01-01 PROCEDURE — 65270000029 HC RM PRIVATE

## 2019-01-01 PROCEDURE — 80048 BASIC METABOLIC PNL TOTAL CA: CPT

## 2019-01-01 PROCEDURE — 74011000258 HC RX REV CODE- 258: Performed by: INTERNAL MEDICINE

## 2019-01-01 PROCEDURE — 85018 HEMOGLOBIN: CPT

## 2019-01-01 PROCEDURE — 73700 CT LOWER EXTREMITY W/O DYE: CPT

## 2019-01-01 PROCEDURE — 99285 EMERGENCY DEPT VISIT HI MDM: CPT

## 2019-01-01 PROCEDURE — 77010033678 HC OXYGEN DAILY

## 2019-01-01 PROCEDURE — 84100 ASSAY OF PHOSPHORUS: CPT

## 2019-01-01 PROCEDURE — 74011250637 HC RX REV CODE- 250/637: Performed by: INTERNAL MEDICINE

## 2019-01-01 PROCEDURE — 97165 OT EVAL LOW COMPLEX 30 MIN: CPT

## 2019-01-01 PROCEDURE — 81001 URINALYSIS AUTO W/SCOPE: CPT

## 2019-01-01 PROCEDURE — 77030006812 HC BLD SAW RECIP STRY -B: Performed by: ORTHOPAEDIC SURGERY

## 2019-01-01 PROCEDURE — 73502 X-RAY EXAM HIP UNI 2-3 VIEWS: CPT

## 2019-01-01 PROCEDURE — 97164 PT RE-EVAL EST PLAN CARE: CPT

## 2019-01-01 PROCEDURE — 74011250636 HC RX REV CODE- 250/636: Performed by: INTERNAL MEDICINE

## 2019-01-01 PROCEDURE — 77030011943

## 2019-01-01 PROCEDURE — 77030033067 HC SUT PDO STRATFX SPIR J&J -B: Performed by: ORTHOPAEDIC SURGERY

## 2019-01-01 PROCEDURE — 77030018547 HC SUT ETHBND1 J&J -B: Performed by: ORTHOPAEDIC SURGERY

## 2019-01-01 PROCEDURE — C1713 ANCHOR/SCREW BN/BN,TIS/BN: HCPCS | Performed by: ORTHOPAEDIC SURGERY

## 2019-01-01 PROCEDURE — 83735 ASSAY OF MAGNESIUM: CPT

## 2019-01-01 PROCEDURE — 74011250637 HC RX REV CODE- 250/637: Performed by: NURSE PRACTITIONER

## 2019-01-01 PROCEDURE — 51798 US URINE CAPACITY MEASURE: CPT

## 2019-01-01 PROCEDURE — 82962 GLUCOSE BLOOD TEST: CPT

## 2019-01-01 PROCEDURE — 74011250637 HC RX REV CODE- 250/637: Performed by: ANESTHESIOLOGY

## 2019-01-01 PROCEDURE — 0656 HSPC GENERAL INPATIENT

## 2019-01-01 PROCEDURE — 74011250637 HC RX REV CODE- 250/637: Performed by: FAMILY MEDICINE

## 2019-01-01 PROCEDURE — 74011250636 HC RX REV CODE- 250/636: Performed by: PHYSICIAN ASSISTANT

## 2019-01-01 PROCEDURE — 77030012935 HC DRSG AQUACEL BMS -B: Performed by: ORTHOPAEDIC SURGERY

## 2019-01-01 PROCEDURE — 77030031139 HC SUT VCRL2 J&J -A: Performed by: ORTHOPAEDIC SURGERY

## 2019-01-01 PROCEDURE — 70450 CT HEAD/BRAIN W/O DYE: CPT

## 2019-01-01 PROCEDURE — 74011000258 HC RX REV CODE- 258: Performed by: STUDENT IN AN ORGANIZED HEALTH CARE EDUCATION/TRAINING PROGRAM

## 2019-01-01 PROCEDURE — 74011250636 HC RX REV CODE- 250/636: Performed by: NURSE ANESTHETIST, CERTIFIED REGISTERED

## 2019-01-01 PROCEDURE — 36573 INSJ PICC RS&I 5 YR+: CPT | Performed by: INTERNAL MEDICINE

## 2019-01-01 PROCEDURE — P9045 ALBUMIN (HUMAN), 5%, 250 ML: HCPCS | Performed by: NURSE PRACTITIONER

## 2019-01-01 PROCEDURE — 02HV33Z INSERTION OF INFUSION DEVICE INTO SUPERIOR VENA CAVA, PERCUTANEOUS APPROACH: ICD-10-PCS

## 2019-01-01 PROCEDURE — 74011250636 HC RX REV CODE- 250/636: Performed by: ORTHOPAEDIC SURGERY

## 2019-01-01 PROCEDURE — 77030002933 HC SUT MCRYL J&J -A: Performed by: ORTHOPAEDIC SURGERY

## 2019-01-01 PROCEDURE — 90471 IMMUNIZATION ADMIN: CPT

## 2019-01-01 PROCEDURE — 76210000000 HC OR PH I REC 2 TO 2.5 HR: Performed by: ORTHOPAEDIC SURGERY

## 2019-01-01 PROCEDURE — 74011250636 HC RX REV CODE- 250/636: Performed by: ANESTHESIOLOGY

## 2019-01-01 PROCEDURE — 74011250636 HC RX REV CODE- 250/636: Performed by: FAMILY MEDICINE

## 2019-01-01 PROCEDURE — 3336500001 HSPC ELECTION

## 2019-01-01 PROCEDURE — 85025 COMPLETE CBC W/AUTO DIFF WBC: CPT

## 2019-01-01 PROCEDURE — 85027 COMPLETE CBC AUTOMATED: CPT

## 2019-01-01 PROCEDURE — 77030026438 HC STYL ET INTUB CARD -A: Performed by: ANESTHESIOLOGY

## 2019-01-01 PROCEDURE — 36415 COLL VENOUS BLD VENIPUNCTURE: CPT

## 2019-01-01 PROCEDURE — 74011250637 HC RX REV CODE- 250/637: Performed by: STUDENT IN AN ORGANIZED HEALTH CARE EDUCATION/TRAINING PROGRAM

## 2019-01-01 PROCEDURE — 65660000000 HC RM CCU STEPDOWN

## 2019-01-01 PROCEDURE — 74011000250 HC RX REV CODE- 250: Performed by: NURSE PRACTITIONER

## 2019-01-01 PROCEDURE — 74011000250 HC RX REV CODE- 250: Performed by: FAMILY MEDICINE

## 2019-01-01 PROCEDURE — 77030036660

## 2019-01-01 PROCEDURE — 74011250637 HC RX REV CODE- 250/637: Performed by: PHYSICIAN ASSISTANT

## 2019-01-01 PROCEDURE — 74011250636 HC RX REV CODE- 250/636: Performed by: EMERGENCY MEDICINE

## 2019-01-01 PROCEDURE — 76010000133 HC OR TIME 3 TO 3.5 HR: Performed by: ORTHOPAEDIC SURGERY

## 2019-01-01 PROCEDURE — 93005 ELECTROCARDIOGRAM TRACING: CPT

## 2019-01-01 PROCEDURE — 77030018673: Performed by: ORTHOPAEDIC SURGERY

## 2019-01-01 PROCEDURE — 77030018846 HC SOL IRR STRL H20 ICUM -A: Performed by: ORTHOPAEDIC SURGERY

## 2019-01-01 PROCEDURE — 74011000250 HC RX REV CODE- 250: Performed by: NURSE ANESTHETIST, CERTIFIED REGISTERED

## 2019-01-01 PROCEDURE — 97161 PT EVAL LOW COMPLEX 20 MIN: CPT

## 2019-01-01 PROCEDURE — 71045 X-RAY EXAM CHEST 1 VIEW: CPT

## 2019-01-01 PROCEDURE — P9045 ALBUMIN (HUMAN), 5%, 250 ML: HCPCS | Performed by: NURSE ANESTHETIST, CERTIFIED REGISTERED

## 2019-01-01 PROCEDURE — 77030002916 HC SUT ETHLN J&J -A: Performed by: ORTHOPAEDIC SURGERY

## 2019-01-01 PROCEDURE — 72170 X-RAY EXAM OF PELVIS: CPT

## 2019-01-01 PROCEDURE — 97535 SELF CARE MNGMENT TRAINING: CPT

## 2019-01-01 PROCEDURE — 65210000002 HC RM PRIVATE GYN

## 2019-01-01 PROCEDURE — 77030011264 HC ELECTRD BLD EXT COVD -A: Performed by: ORTHOPAEDIC SURGERY

## 2019-01-01 PROCEDURE — 87186 SC STD MICRODIL/AGAR DIL: CPT

## 2019-01-01 PROCEDURE — 77030005518 HC CATH URETH FOL 2W BARD -B

## 2019-01-01 PROCEDURE — 77030008684 HC TU ET CUF COVD -B: Performed by: ANESTHESIOLOGY

## 2019-01-01 PROCEDURE — 87040 BLOOD CULTURE FOR BACTERIA: CPT

## 2019-01-01 PROCEDURE — C1776 JOINT DEVICE (IMPLANTABLE): HCPCS | Performed by: ORTHOPAEDIC SURGERY

## 2019-01-01 PROCEDURE — 74011250636 HC RX REV CODE- 250/636: Performed by: HOSPITALIST

## 2019-01-01 PROCEDURE — 77030027138 HC INCENT SPIROMETER -A

## 2019-01-01 PROCEDURE — 77030018836 HC SOL IRR NACL ICUM -A: Performed by: ORTHOPAEDIC SURGERY

## 2019-01-01 PROCEDURE — 77030018074 HC RTVR SUT ARTH4 S&N -B: Performed by: ORTHOPAEDIC SURGERY

## 2019-01-01 PROCEDURE — 80053 COMPREHEN METABOLIC PANEL: CPT

## 2019-01-01 PROCEDURE — 87075 CULTR BACTERIA EXCEPT BLOOD: CPT

## 2019-01-01 PROCEDURE — 0SRS0JA REPLACEMENT OF LEFT HIP JOINT, FEMORAL SURFACE WITH SYNTHETIC SUBSTITUTE, UNCEMENTED, OPEN APPROACH: ICD-10-PCS | Performed by: ORTHOPAEDIC SURGERY

## 2019-01-01 PROCEDURE — 96374 THER/PROPH/DIAG INJ IV PUSH: CPT

## 2019-01-01 PROCEDURE — P9016 RBC LEUKOCYTES REDUCED: HCPCS

## 2019-01-01 PROCEDURE — 76937 US GUIDE VASCULAR ACCESS: CPT

## 2019-01-01 PROCEDURE — 74011000250 HC RX REV CODE- 250: Performed by: INTERNAL MEDICINE

## 2019-01-01 PROCEDURE — 65270000032 HC RM SEMIPRIVATE

## 2019-01-01 PROCEDURE — 74011250636 HC RX REV CODE- 250/636: Performed by: STUDENT IN AN ORGANIZED HEALTH CARE EDUCATION/TRAINING PROGRAM

## 2019-01-01 PROCEDURE — 87077 CULTURE AEROBIC IDENTIFY: CPT

## 2019-01-01 PROCEDURE — 96375 TX/PRO/DX INJ NEW DRUG ADDON: CPT

## 2019-01-01 PROCEDURE — 76060000035 HC ANESTHESIA 2 TO 2.5 HR: Performed by: ORTHOPAEDIC SURGERY

## 2019-01-01 PROCEDURE — 84484 ASSAY OF TROPONIN QUANT: CPT

## 2019-01-01 PROCEDURE — 97110 THERAPEUTIC EXERCISES: CPT

## 2019-01-01 PROCEDURE — 93306 TTE W/DOPPLER COMPLETE: CPT

## 2019-01-01 PROCEDURE — 80047 BASIC METABLC PNL IONIZED CA: CPT

## 2019-01-01 PROCEDURE — 92610 EVALUATE SWALLOWING FUNCTION: CPT

## 2019-01-01 PROCEDURE — 76060000037 HC ANESTHESIA 3 TO 3.5 HR: Performed by: ORTHOPAEDIC SURGERY

## 2019-01-01 PROCEDURE — 85610 PROTHROMBIN TIME: CPT

## 2019-01-01 PROCEDURE — 36430 TRANSFUSION BLD/BLD COMPNT: CPT

## 2019-01-01 PROCEDURE — 86900 BLOOD TYPING SEROLOGIC ABO: CPT

## 2019-01-01 PROCEDURE — 74011000258 HC RX REV CODE- 258: Performed by: NURSE ANESTHETIST, CERTIFIED REGISTERED

## 2019-01-01 PROCEDURE — 74011000250 HC RX REV CODE- 250: Performed by: ORTHOPAEDIC SURGERY

## 2019-01-01 PROCEDURE — C1751 CATH, INF, PER/CENT/MIDLINE: HCPCS

## 2019-01-01 PROCEDURE — 77030020365 HC SOL INJ SOD CL 0.9% 50ML

## 2019-01-01 PROCEDURE — 90715 TDAP VACCINE 7 YRS/> IM: CPT | Performed by: EMERGENCY MEDICINE

## 2019-01-01 PROCEDURE — 87205 SMEAR GRAM STAIN: CPT

## 2019-01-01 PROCEDURE — 30233N1 TRANSFUSION OF NONAUTOLOGOUS RED BLOOD CELLS INTO PERIPHERAL VEIN, PERCUTANEOUS APPROACH: ICD-10-PCS | Performed by: INTERNAL MEDICINE

## 2019-01-01 PROCEDURE — 77030020782 HC GWN BAIR PAWS FLX 3M -B

## 2019-01-01 PROCEDURE — 77030013079 HC BLNKT BAIR HGGR 3M -A: Performed by: ANESTHESIOLOGY

## 2019-01-01 PROCEDURE — 76010000131 HC OR TIME 2 TO 2.5 HR: Performed by: ORTHOPAEDIC SURGERY

## 2019-01-01 PROCEDURE — 73080 X-RAY EXAM OF ELBOW: CPT

## 2019-01-01 PROCEDURE — 71250 CT THORAX DX C-: CPT

## 2019-01-01 PROCEDURE — 90686 IIV4 VACC NO PRSV 0.5 ML IM: CPT | Performed by: HOSPITALIST

## 2019-01-01 PROCEDURE — 77030039266 HC ADH SKN EXOFIN S2SG -A: Performed by: ORTHOPAEDIC SURGERY

## 2019-01-01 PROCEDURE — 77030034850: Performed by: ORTHOPAEDIC SURGERY

## 2019-01-01 PROCEDURE — 77030040361 HC SLV COMPR DVT MDII -B

## 2019-01-01 PROCEDURE — 76210000006 HC OR PH I REC 0.5 TO 1 HR: Performed by: ORTHOPAEDIC SURGERY

## 2019-01-01 PROCEDURE — 97535 SELF CARE MNGMENT TRAINING: CPT | Performed by: OCCUPATIONAL THERAPIST

## 2019-01-01 PROCEDURE — 0SPB0JZ REMOVAL OF SYNTHETIC SUBSTITUTE FROM LEFT HIP JOINT, OPEN APPROACH: ICD-10-PCS | Performed by: ORTHOPAEDIC SURGERY

## 2019-01-01 PROCEDURE — 77030006822 HC BLD SAW SAG BRSM -B: Performed by: ORTHOPAEDIC SURGERY

## 2019-01-01 PROCEDURE — 77030021302 HC BUR RND FLUT BRSM -B: Performed by: ORTHOPAEDIC SURGERY

## 2019-01-01 PROCEDURE — 0J9M3ZX DRAINAGE OF LEFT UPPER LEG SUBCUTANEOUS TISSUE AND FASCIA, PERCUTANEOUS APPROACH, DIAGNOSTIC: ICD-10-PCS | Performed by: PHYSICIAN ASSISTANT

## 2019-01-01 PROCEDURE — 0SRB0EZ REPLACEMENT OF LEFT HIP JOINT WITH ARTICULATING SPACER, OPEN APPROACH: ICD-10-PCS | Performed by: ORTHOPAEDIC SURGERY

## 2019-01-01 PROCEDURE — 77030011640 HC PAD GRND REM COVD -A: Performed by: ORTHOPAEDIC SURGERY

## 2019-01-01 PROCEDURE — 92526 ORAL FUNCTION THERAPY: CPT

## 2019-01-01 PROCEDURE — 74011000258 HC RX REV CODE- 258: Performed by: EMERGENCY MEDICINE

## 2019-01-01 PROCEDURE — 83605 ASSAY OF LACTIC ACID: CPT

## 2019-01-01 PROCEDURE — 77030002966 HC SUT PDS J&J -A: Performed by: ORTHOPAEDIC SURGERY

## 2019-01-01 PROCEDURE — 77030005513 HC CATH URETH FOL11 MDII -B

## 2019-01-01 PROCEDURE — 82550 ASSAY OF CK (CPK): CPT

## 2019-01-01 PROCEDURE — 74011000258 HC RX REV CODE- 258: Performed by: PHYSICIAN ASSISTANT

## 2019-01-01 PROCEDURE — 76857 US EXAM PELVIC LIMITED: CPT

## 2019-01-01 PROCEDURE — 86923 COMPATIBILITY TEST ELECTRIC: CPT

## 2019-01-01 PROCEDURE — 77030035236 HC SUT PDS STRATFX BARB J&J -B: Performed by: ORTHOPAEDIC SURGERY

## 2019-01-01 DEVICE — ARTICUL/EZE FEMORAL HEAD DIAMETER 28MM +5 12/14 TAPER
Type: IMPLANTABLE DEVICE | Site: HIP | Status: FUNCTIONAL
Brand: ARTICUL/EZE

## 2019-01-01 DEVICE — SELF CENTERING BI-POLAR HEAD 28MM ID 50MM OD
Type: IMPLANTABLE DEVICE | Site: HIP | Status: FUNCTIONAL
Brand: SELF CENTERING

## 2019-01-01 DEVICE — STEM FEM HIP BPLR/UPLR CEM: Type: IMPLANTABLE DEVICE | Status: FUNCTIONAL

## 2019-01-01 DEVICE — SUMMIT FEMORAL STEM 12/14 TAPER CEMENTED SIZE 5 STD 120MM
Type: IMPLANTABLE DEVICE | Site: HIP | Status: FUNCTIONAL
Brand: SUMMIT

## 2019-01-01 DEVICE — ARTICUL/EZE FEMORAL HEAD DIAMETER 28MM +8.5 12/14 TAPER
Type: IMPLANTABLE DEVICE | Site: HIP | Status: FUNCTIONAL
Brand: ARTICUL/EZE

## 2019-01-01 DEVICE — STEM FEM PRSS FT HIP BPLR/UPLR CEM: Type: IMPLANTABLE DEVICE | Site: HIP | Status: FUNCTIONAL

## 2019-01-01 DEVICE — SMARTSET GHV GENTAMICIN HIGH VISCOSITY BONE CEMENT 40G
Type: IMPLANTABLE DEVICE | Site: HIP | Status: FUNCTIONAL
Brand: SMARTSET

## 2019-01-01 RX ORDER — NALOXONE HYDROCHLORIDE 0.4 MG/ML
0.4 INJECTION, SOLUTION INTRAMUSCULAR; INTRAVENOUS; SUBCUTANEOUS AS NEEDED
Status: DISCONTINUED | OUTPATIENT
Start: 2019-01-01 | End: 2019-01-01 | Stop reason: HOSPADM

## 2019-01-01 RX ORDER — CEFAZOLIN SODIUM/WATER 2 G/20 ML
2 SYRINGE (ML) INTRAVENOUS EVERY 8 HOURS
Status: DISCONTINUED | OUTPATIENT
Start: 2019-01-01 | End: 2019-01-01 | Stop reason: SDUPTHER

## 2019-01-01 RX ORDER — EPHEDRINE SULFATE/0.9% NACL/PF 50 MG/5 ML
5 SYRINGE (ML) INTRAVENOUS AS NEEDED
Status: DISCONTINUED | OUTPATIENT
Start: 2019-01-01 | End: 2019-01-01

## 2019-01-01 RX ORDER — POLYETHYLENE GLYCOL 3350 17 G/17G
17 POWDER, FOR SOLUTION ORAL DAILY
Status: DISCONTINUED | OUTPATIENT
Start: 2019-01-01 | End: 2019-01-01

## 2019-01-01 RX ORDER — NALOXONE HYDROCHLORIDE 0.4 MG/ML
0.4 INJECTION, SOLUTION INTRAMUSCULAR; INTRAVENOUS; SUBCUTANEOUS AS NEEDED
Status: DISCONTINUED | OUTPATIENT
Start: 2019-01-01 | End: 2019-01-01 | Stop reason: SDUPTHER

## 2019-01-01 RX ORDER — SODIUM CHLORIDE 0.9 % (FLUSH) 0.9 %
5-40 SYRINGE (ML) INJECTION AS NEEDED
Status: DISCONTINUED | OUTPATIENT
Start: 2019-01-01 | End: 2019-01-01 | Stop reason: HOSPADM

## 2019-01-01 RX ORDER — SODIUM CHLORIDE 0.9 % (FLUSH) 0.9 %
5-40 SYRINGE (ML) INJECTION EVERY 8 HOURS
Status: DISCONTINUED | OUTPATIENT
Start: 2019-01-01 | End: 2019-01-01 | Stop reason: HOSPADM

## 2019-01-01 RX ORDER — HALOPERIDOL 5 MG/ML
3 INJECTION INTRAMUSCULAR ONCE
Status: ACTIVE | OUTPATIENT
Start: 2019-01-01 | End: 2019-01-01

## 2019-01-01 RX ORDER — SCOLOPAMINE TRANSDERMAL SYSTEM 1 MG/1
1 PATCH, EXTENDED RELEASE TRANSDERMAL
Status: DISCONTINUED | OUTPATIENT
Start: 2019-01-01 | End: 2019-01-01 | Stop reason: HOSPADM

## 2019-01-01 RX ORDER — ACETAMINOPHEN 325 MG/1
650 TABLET ORAL ONCE
Status: COMPLETED | OUTPATIENT
Start: 2019-01-01 | End: 2019-01-01

## 2019-01-01 RX ORDER — DEXAMETHASONE SODIUM PHOSPHATE 4 MG/ML
INJECTION, SOLUTION INTRA-ARTICULAR; INTRALESIONAL; INTRAMUSCULAR; INTRAVENOUS; SOFT TISSUE AS NEEDED
Status: DISCONTINUED | OUTPATIENT
Start: 2019-01-01 | End: 2019-01-01 | Stop reason: HOSPADM

## 2019-01-01 RX ORDER — SODIUM CHLORIDE 0.9 % (FLUSH) 0.9 %
5-40 SYRINGE (ML) INJECTION AS NEEDED
Status: DISCONTINUED | OUTPATIENT
Start: 2019-01-01 | End: 2019-01-01 | Stop reason: SDUPTHER

## 2019-01-01 RX ORDER — METOPROLOL TARTRATE 50 MG/1
50 TABLET ORAL 2 TIMES DAILY
Status: DISCONTINUED | OUTPATIENT
Start: 2019-01-01 | End: 2019-01-01 | Stop reason: HOSPADM

## 2019-01-01 RX ORDER — AMLODIPINE BESYLATE 5 MG/1
5 TABLET ORAL DAILY
Status: DISCONTINUED | OUTPATIENT
Start: 2019-01-01 | End: 2019-01-01

## 2019-01-01 RX ORDER — TRIAMTERENE/HYDROCHLOROTHIAZID 37.5-25 MG
1 TABLET ORAL DAILY
Status: DISCONTINUED | OUTPATIENT
Start: 2019-01-01 | End: 2019-01-01

## 2019-01-01 RX ORDER — SODIUM CHLORIDE 9 MG/ML
25 INJECTION, SOLUTION INTRAVENOUS CONTINUOUS
Status: DISCONTINUED | OUTPATIENT
Start: 2019-01-01 | End: 2019-01-01

## 2019-01-01 RX ORDER — HYDROMORPHONE HYDROCHLORIDE 1 MG/ML
0.2 INJECTION, SOLUTION INTRAMUSCULAR; INTRAVENOUS; SUBCUTANEOUS
Status: DISCONTINUED | OUTPATIENT
Start: 2019-01-01 | End: 2019-01-01 | Stop reason: HOSPADM

## 2019-01-01 RX ORDER — MIDAZOLAM HYDROCHLORIDE 1 MG/ML
1 INJECTION, SOLUTION INTRAMUSCULAR; INTRAVENOUS AS NEEDED
Status: DISCONTINUED | OUTPATIENT
Start: 2019-01-01 | End: 2019-01-01 | Stop reason: HOSPADM

## 2019-01-01 RX ORDER — CEFAZOLIN SODIUM 1 G/3ML
INJECTION, POWDER, FOR SOLUTION INTRAMUSCULAR; INTRAVENOUS AS NEEDED
Status: DISCONTINUED | OUTPATIENT
Start: 2019-01-01 | End: 2019-01-01 | Stop reason: HOSPADM

## 2019-01-01 RX ORDER — TRAMADOL HYDROCHLORIDE 50 MG/1
50 TABLET ORAL
Status: DISCONTINUED | OUTPATIENT
Start: 2019-01-01 | End: 2019-01-01

## 2019-01-01 RX ORDER — ALBUMIN HUMAN 50 G/1000ML
SOLUTION INTRAVENOUS AS NEEDED
Status: DISCONTINUED | OUTPATIENT
Start: 2019-01-01 | End: 2019-01-01 | Stop reason: HOSPADM

## 2019-01-01 RX ORDER — FACIAL-BODY WIPES
10 EACH TOPICAL DAILY PRN
Status: DISCONTINUED | OUTPATIENT
Start: 2019-01-01 | End: 2019-01-01 | Stop reason: HOSPADM

## 2019-01-01 RX ORDER — CEFAZOLIN SODIUM/WATER 2 G/20 ML
SYRINGE (ML) INTRAVENOUS
Status: DISPENSED
Start: 2019-01-01 | End: 2019-01-01

## 2019-01-01 RX ORDER — HALOPERIDOL 5 MG/ML
2 INJECTION INTRAMUSCULAR
Status: DISCONTINUED | OUTPATIENT
Start: 2019-01-01 | End: 2019-01-01 | Stop reason: HOSPADM

## 2019-01-01 RX ORDER — ENOXAPARIN SODIUM 100 MG/ML
40 INJECTION SUBCUTANEOUS DAILY
Status: DISCONTINUED | OUTPATIENT
Start: 2019-01-01 | End: 2019-01-01

## 2019-01-01 RX ORDER — ONDANSETRON 2 MG/ML
INJECTION INTRAMUSCULAR; INTRAVENOUS AS NEEDED
Status: DISCONTINUED | OUTPATIENT
Start: 2019-01-01 | End: 2019-01-01 | Stop reason: HOSPADM

## 2019-01-01 RX ORDER — SODIUM CHLORIDE, SODIUM LACTATE, POTASSIUM CHLORIDE, CALCIUM CHLORIDE 600; 310; 30; 20 MG/100ML; MG/100ML; MG/100ML; MG/100ML
500 INJECTION, SOLUTION INTRAVENOUS CONTINUOUS
Status: DISCONTINUED | OUTPATIENT
Start: 2019-01-01 | End: 2019-01-01

## 2019-01-01 RX ORDER — SODIUM CHLORIDE 9 MG/ML
25 INJECTION, SOLUTION INTRAVENOUS CONTINUOUS
Status: DISCONTINUED | OUTPATIENT
Start: 2019-01-01 | End: 2019-01-01 | Stop reason: HOSPADM

## 2019-01-01 RX ORDER — AMLODIPINE BESYLATE 5 MG/1
10 TABLET ORAL DAILY
Status: CANCELLED | OUTPATIENT
Start: 2019-01-01

## 2019-01-01 RX ORDER — ENOXAPARIN SODIUM 100 MG/ML
30 INJECTION SUBCUTANEOUS DAILY
Status: DISCONTINUED | OUTPATIENT
Start: 2019-01-01 | End: 2019-01-01

## 2019-01-01 RX ORDER — LEVOFLOXACIN 750 MG/1
750 TABLET ORAL
Status: DISCONTINUED | OUTPATIENT
Start: 2019-01-01 | End: 2019-01-01

## 2019-01-01 RX ORDER — LIDOCAINE HYDROCHLORIDE 10 MG/ML
0.1 INJECTION, SOLUTION EPIDURAL; INFILTRATION; INTRACAUDAL; PERINEURAL AS NEEDED
Status: DISCONTINUED | OUTPATIENT
Start: 2019-01-01 | End: 2019-01-01 | Stop reason: HOSPADM

## 2019-01-01 RX ORDER — ACETAMINOPHEN 500 MG
500 TABLET ORAL EVERY 6 HOURS
Qty: 60 TAB | Refills: 0 | Status: SHIPPED
Start: 2019-01-01

## 2019-01-01 RX ORDER — SODIUM CHLORIDE, SODIUM LACTATE, POTASSIUM CHLORIDE, CALCIUM CHLORIDE 600; 310; 30; 20 MG/100ML; MG/100ML; MG/100ML; MG/100ML
125 INJECTION, SOLUTION INTRAVENOUS CONTINUOUS
Status: DISCONTINUED | OUTPATIENT
Start: 2019-01-01 | End: 2019-01-01 | Stop reason: HOSPADM

## 2019-01-01 RX ORDER — SODIUM CHLORIDE 0.9 % (FLUSH) 0.9 %
5-40 SYRINGE (ML) INJECTION EVERY 8 HOURS
Status: DISCONTINUED | OUTPATIENT
Start: 2019-01-01 | End: 2019-01-01 | Stop reason: SDUPTHER

## 2019-01-01 RX ORDER — MORPHINE SULFATE 2 MG/ML
1 INJECTION, SOLUTION INTRAMUSCULAR; INTRAVENOUS
Status: DISCONTINUED | OUTPATIENT
Start: 2019-01-01 | End: 2019-01-01 | Stop reason: HOSPADM

## 2019-01-01 RX ORDER — TRAMADOL HYDROCHLORIDE 50 MG/1
50 TABLET ORAL
Status: DISCONTINUED | OUTPATIENT
Start: 2019-01-01 | End: 2019-01-01 | Stop reason: HOSPADM

## 2019-01-01 RX ORDER — AMLODIPINE BESYLATE 5 MG/1
2.5 TABLET ORAL DAILY
Status: DISCONTINUED | OUTPATIENT
Start: 2019-01-01 | End: 2019-01-01

## 2019-01-01 RX ORDER — LEVOFLOXACIN 5 MG/ML
750 INJECTION, SOLUTION INTRAVENOUS
Status: DISCONTINUED | OUTPATIENT
Start: 2019-01-01 | End: 2019-01-01

## 2019-01-01 RX ORDER — SODIUM CHLORIDE 9 MG/ML
50 INJECTION, SOLUTION INTRAVENOUS CONTINUOUS
Status: DISCONTINUED | OUTPATIENT
Start: 2019-01-01 | End: 2019-01-01

## 2019-01-01 RX ORDER — MORPHINE SULFATE 10 MG/ML
2 INJECTION, SOLUTION INTRAMUSCULAR; INTRAVENOUS
Status: DISCONTINUED | OUTPATIENT
Start: 2019-01-01 | End: 2019-01-01 | Stop reason: HOSPADM

## 2019-01-01 RX ORDER — FENTANYL CITRATE 50 UG/ML
INJECTION, SOLUTION INTRAMUSCULAR; INTRAVENOUS AS NEEDED
Status: DISCONTINUED | OUTPATIENT
Start: 2019-01-01 | End: 2019-01-01 | Stop reason: HOSPADM

## 2019-01-01 RX ORDER — MORPHINE SULFATE 2 MG/ML
2 INJECTION, SOLUTION INTRAMUSCULAR; INTRAVENOUS
Status: DISCONTINUED | OUTPATIENT
Start: 2019-01-01 | End: 2019-01-01

## 2019-01-01 RX ORDER — SODIUM CHLORIDE 0.9 % (FLUSH) 0.9 %
5 SYRINGE (ML) INJECTION AS NEEDED
Status: DISCONTINUED | OUTPATIENT
Start: 2019-01-01 | End: 2019-01-01 | Stop reason: HOSPADM

## 2019-01-01 RX ORDER — QUETIAPINE FUMARATE 25 MG/1
25 TABLET, FILM COATED ORAL
Status: DISCONTINUED | OUTPATIENT
Start: 2019-01-01 | End: 2019-01-01 | Stop reason: HOSPADM

## 2019-01-01 RX ORDER — DIPHENHYDRAMINE HYDROCHLORIDE 50 MG/ML
12.5 INJECTION, SOLUTION INTRAMUSCULAR; INTRAVENOUS AS NEEDED
Status: DISCONTINUED | OUTPATIENT
Start: 2019-01-01 | End: 2019-01-01 | Stop reason: HOSPADM

## 2019-01-01 RX ORDER — DOCUSATE SODIUM 100 MG/1
100 CAPSULE, LIQUID FILLED ORAL
Status: DISCONTINUED | OUTPATIENT
Start: 2019-01-01 | End: 2019-01-01 | Stop reason: HOSPADM

## 2019-01-01 RX ORDER — MIDAZOLAM HYDROCHLORIDE 1 MG/ML
0.5 INJECTION, SOLUTION INTRAMUSCULAR; INTRAVENOUS
Status: DISCONTINUED | OUTPATIENT
Start: 2019-01-01 | End: 2019-01-01

## 2019-01-01 RX ORDER — SODIUM CHLORIDE, SODIUM LACTATE, POTASSIUM CHLORIDE, CALCIUM CHLORIDE 600; 310; 30; 20 MG/100ML; MG/100ML; MG/100ML; MG/100ML
1000 INJECTION, SOLUTION INTRAVENOUS CONTINUOUS
Status: DISCONTINUED | OUTPATIENT
Start: 2019-01-01 | End: 2019-01-01 | Stop reason: HOSPADM

## 2019-01-01 RX ORDER — ROPIVACAINE HYDROCHLORIDE 5 MG/ML
150 INJECTION, SOLUTION EPIDURAL; INFILTRATION; PERINEURAL AS NEEDED
Status: DISCONTINUED | OUTPATIENT
Start: 2019-01-01 | End: 2019-01-01 | Stop reason: HOSPADM

## 2019-01-01 RX ORDER — NEOSTIGMINE METHYLSULFATE 1 MG/ML
INJECTION INTRAVENOUS AS NEEDED
Status: DISCONTINUED | OUTPATIENT
Start: 2019-01-01 | End: 2019-01-01 | Stop reason: HOSPADM

## 2019-01-01 RX ORDER — BALSAM PERU/CASTOR OIL
OINTMENT (GRAM) TOPICAL 2 TIMES DAILY
Status: DISCONTINUED | OUTPATIENT
Start: 2019-01-01 | End: 2019-01-01 | Stop reason: HOSPADM

## 2019-01-01 RX ORDER — VANCOMYCIN/0.9 % SOD CHLORIDE 1.5G/250ML
1500 PLASTIC BAG, INJECTION (ML) INTRAVENOUS
Status: COMPLETED | OUTPATIENT
Start: 2019-01-01 | End: 2019-01-01

## 2019-01-01 RX ORDER — ASPIRIN 325 MG
325 TABLET, DELAYED RELEASE (ENTERIC COATED) ORAL DAILY
COMMUNITY

## 2019-01-01 RX ORDER — AMLODIPINE BESYLATE 10 MG/1
10 TABLET ORAL DAILY
Qty: 30 TAB | Refills: 0 | Status: SHIPPED
Start: 2019-01-01 | End: 2019-01-01

## 2019-01-01 RX ORDER — MORPHINE SULFATE 2 MG/ML
1 INJECTION, SOLUTION INTRAMUSCULAR; INTRAVENOUS
Status: DISCONTINUED | OUTPATIENT
Start: 2019-01-01 | End: 2019-01-01 | Stop reason: SDUPTHER

## 2019-01-01 RX ORDER — SODIUM CHLORIDE, SODIUM LACTATE, POTASSIUM CHLORIDE, CALCIUM CHLORIDE 600; 310; 30; 20 MG/100ML; MG/100ML; MG/100ML; MG/100ML
INJECTION, SOLUTION INTRAVENOUS
Status: DISCONTINUED | OUTPATIENT
Start: 2019-01-01 | End: 2019-01-01 | Stop reason: HOSPADM

## 2019-01-01 RX ORDER — OXYCODONE HYDROCHLORIDE 5 MG/1
2.5 TABLET ORAL
Status: DISCONTINUED | OUTPATIENT
Start: 2019-01-01 | End: 2019-01-01

## 2019-01-01 RX ORDER — PROPOFOL 10 MG/ML
INJECTION, EMULSION INTRAVENOUS AS NEEDED
Status: DISCONTINUED | OUTPATIENT
Start: 2019-01-01 | End: 2019-01-01 | Stop reason: HOSPADM

## 2019-01-01 RX ORDER — ACETAMINOPHEN 325 MG/1
650 TABLET ORAL
Status: DISCONTINUED | OUTPATIENT
Start: 2019-01-01 | End: 2019-01-01

## 2019-01-01 RX ORDER — ASPIRIN 325 MG
325 TABLET, DELAYED RELEASE (ENTERIC COATED) ORAL DAILY
Status: DISCONTINUED | OUTPATIENT
Start: 2019-01-01 | End: 2019-01-01 | Stop reason: ALTCHOICE

## 2019-01-01 RX ORDER — EPHEDRINE SULFATE/0.9% NACL/PF 50 MG/5 ML
SYRINGE (ML) INTRAVENOUS AS NEEDED
Status: DISCONTINUED | OUTPATIENT
Start: 2019-01-01 | End: 2019-01-01 | Stop reason: HOSPADM

## 2019-01-01 RX ORDER — ENOXAPARIN SODIUM 100 MG/ML
40 INJECTION SUBCUTANEOUS EVERY 24 HOURS
Status: DISCONTINUED | OUTPATIENT
Start: 2019-01-01 | End: 2019-01-01 | Stop reason: ALTCHOICE

## 2019-01-01 RX ORDER — VANCOMYCIN HYDROCHLORIDE 1 G/20ML
INJECTION, POWDER, LYOPHILIZED, FOR SOLUTION INTRAVENOUS AS NEEDED
Status: DISCONTINUED | OUTPATIENT
Start: 2019-01-01 | End: 2019-01-01 | Stop reason: HOSPADM

## 2019-01-01 RX ORDER — HYDROMORPHONE HYDROCHLORIDE 1 MG/ML
0.5 INJECTION, SOLUTION INTRAMUSCULAR; INTRAVENOUS; SUBCUTANEOUS
Status: ACTIVE | OUTPATIENT
Start: 2019-01-01 | End: 2019-01-01

## 2019-01-01 RX ORDER — DOCUSATE SODIUM 100 MG/1
100 CAPSULE, LIQUID FILLED ORAL
Qty: 60 CAP | Refills: 0 | Status: SHIPPED | OUTPATIENT
Start: 2019-01-01 | End: 2019-12-08

## 2019-01-01 RX ORDER — FERROUS SULFATE, DRIED 160(50) MG
1 TABLET, EXTENDED RELEASE ORAL
Status: DISCONTINUED | OUTPATIENT
Start: 2019-01-01 | End: 2019-01-01 | Stop reason: HOSPADM

## 2019-01-01 RX ORDER — NITROGLYCERIN 0.4 MG/1
0.4 TABLET SUBLINGUAL
Status: DISCONTINUED | OUTPATIENT
Start: 2019-01-01 | End: 2019-01-01 | Stop reason: HOSPADM

## 2019-01-01 RX ORDER — SODIUM CHLORIDE 9 MG/ML
250 INJECTION, SOLUTION INTRAVENOUS AS NEEDED
Status: DISCONTINUED | OUTPATIENT
Start: 2019-01-01 | End: 2019-01-01 | Stop reason: HOSPADM

## 2019-01-01 RX ORDER — FENTANYL CITRATE 50 UG/ML
50 INJECTION, SOLUTION INTRAMUSCULAR; INTRAVENOUS AS NEEDED
Status: DISCONTINUED | OUTPATIENT
Start: 2019-01-01 | End: 2019-01-01 | Stop reason: HOSPADM

## 2019-01-01 RX ORDER — METOPROLOL TARTRATE 50 MG/1
50 TABLET ORAL 2 TIMES DAILY
Qty: 60 TAB | Refills: 0 | Status: SHIPPED
Start: 2019-01-01

## 2019-01-01 RX ORDER — OXYCODONE AND ACETAMINOPHEN 5; 325 MG/1; MG/1
1 TABLET ORAL AS NEEDED
Status: DISCONTINUED | OUTPATIENT
Start: 2019-01-01 | End: 2019-01-01 | Stop reason: HOSPADM

## 2019-01-01 RX ORDER — HYDRALAZINE HYDROCHLORIDE 20 MG/ML
10 INJECTION INTRAMUSCULAR; INTRAVENOUS
Status: DISCONTINUED | OUTPATIENT
Start: 2019-01-01 | End: 2019-01-01

## 2019-01-01 RX ORDER — GLYCOPYRROLATE 0.2 MG/ML
0.2 INJECTION INTRAMUSCULAR; INTRAVENOUS EVERY 4 HOURS
Status: DISCONTINUED | OUTPATIENT
Start: 2019-01-01 | End: 2019-01-01

## 2019-01-01 RX ORDER — METOPROLOL TARTRATE 50 MG/1
50 TABLET ORAL 2 TIMES DAILY
Status: DISCONTINUED | OUTPATIENT
Start: 2019-01-01 | End: 2019-01-01

## 2019-01-01 RX ORDER — LORAZEPAM 2 MG/ML
1 INJECTION INTRAMUSCULAR
Status: DISCONTINUED | OUTPATIENT
Start: 2019-01-01 | End: 2019-01-01 | Stop reason: HOSPADM

## 2019-01-01 RX ORDER — ONDANSETRON 2 MG/ML
4 INJECTION INTRAMUSCULAR; INTRAVENOUS
Status: DISCONTINUED | OUTPATIENT
Start: 2019-01-01 | End: 2019-01-01 | Stop reason: SDUPTHER

## 2019-01-01 RX ORDER — CEFAZOLIN SODIUM/WATER 2 G/20 ML
2 SYRINGE (ML) INTRAVENOUS
Status: COMPLETED | OUTPATIENT
Start: 2019-01-01 | End: 2019-01-01

## 2019-01-01 RX ORDER — AMLODIPINE BESYLATE 5 MG/1
7.5 TABLET ORAL DAILY
Status: DISCONTINUED | OUTPATIENT
Start: 2019-01-01 | End: 2019-01-01

## 2019-01-01 RX ORDER — PHENYLEPHRINE HCL IN 0.9% NACL 0.4MG/10ML
SYRINGE (ML) INTRAVENOUS AS NEEDED
Status: DISCONTINUED | OUTPATIENT
Start: 2019-01-01 | End: 2019-01-01 | Stop reason: HOSPADM

## 2019-01-01 RX ORDER — GLYCOPYRROLATE 0.2 MG/ML
0.2 INJECTION INTRAMUSCULAR; INTRAVENOUS
Status: DISCONTINUED | OUTPATIENT
Start: 2019-01-01 | End: 2019-01-01 | Stop reason: HOSPADM

## 2019-01-01 RX ORDER — ACETAMINOPHEN 500 MG
500 TABLET ORAL
Status: DISCONTINUED | OUTPATIENT
Start: 2019-01-01 | End: 2019-01-01 | Stop reason: HOSPADM

## 2019-01-01 RX ORDER — SODIUM CHLORIDE, SODIUM LACTATE, POTASSIUM CHLORIDE, CALCIUM CHLORIDE 600; 310; 30; 20 MG/100ML; MG/100ML; MG/100ML; MG/100ML
50 INJECTION, SOLUTION INTRAVENOUS CONTINUOUS
Status: DISPENSED | OUTPATIENT
Start: 2019-01-01 | End: 2019-01-01

## 2019-01-01 RX ORDER — TRAMADOL HYDROCHLORIDE 50 MG/1
50 TABLET ORAL
Qty: 20 TAB | Refills: 0 | Status: SHIPPED | OUTPATIENT
Start: 2019-01-01 | End: 2019-01-01

## 2019-01-01 RX ORDER — MORPHINE SULFATE 2 MG/ML
4 INJECTION, SOLUTION INTRAMUSCULAR; INTRAVENOUS
Status: COMPLETED | OUTPATIENT
Start: 2019-01-01 | End: 2019-01-01

## 2019-01-01 RX ORDER — TRAMADOL HYDROCHLORIDE 50 MG/1
50 TABLET ORAL
Status: DISCONTINUED | OUTPATIENT
Start: 2019-01-01 | End: 2019-01-01 | Stop reason: SDUPTHER

## 2019-01-01 RX ORDER — LEVOFLOXACIN 500 MG/1
500 TABLET, FILM COATED ORAL EVERY 24 HOURS
Status: DISCONTINUED | OUTPATIENT
Start: 2019-01-01 | End: 2019-01-01 | Stop reason: DRUGHIGH

## 2019-01-01 RX ORDER — KETAMINE HYDROCHLORIDE 10 MG/ML
INJECTION, SOLUTION INTRAMUSCULAR; INTRAVENOUS AS NEEDED
Status: DISCONTINUED | OUTPATIENT
Start: 2019-01-01 | End: 2019-01-01 | Stop reason: HOSPADM

## 2019-01-01 RX ORDER — FUROSEMIDE 10 MG/ML
20 INJECTION INTRAMUSCULAR; INTRAVENOUS DAILY
Status: DISCONTINUED | OUTPATIENT
Start: 2019-01-01 | End: 2019-01-01

## 2019-01-01 RX ORDER — OXYCODONE HYDROCHLORIDE 5 MG/1
5 TABLET ORAL AS NEEDED
Status: DISCONTINUED | OUTPATIENT
Start: 2019-01-01 | End: 2019-01-01

## 2019-01-01 RX ORDER — HYDROXYZINE HYDROCHLORIDE 10 MG/1
10 TABLET, FILM COATED ORAL
Status: DISCONTINUED | OUTPATIENT
Start: 2019-01-01 | End: 2019-01-01 | Stop reason: HOSPADM

## 2019-01-01 RX ORDER — ENOXAPARIN SODIUM 100 MG/ML
40 INJECTION SUBCUTANEOUS DAILY
Status: DISCONTINUED | OUTPATIENT
Start: 2019-01-01 | End: 2019-01-01 | Stop reason: HOSPADM

## 2019-01-01 RX ORDER — GLYCOPYRROLATE 0.2 MG/ML
INJECTION INTRAMUSCULAR; INTRAVENOUS AS NEEDED
Status: DISCONTINUED | OUTPATIENT
Start: 2019-01-01 | End: 2019-01-01 | Stop reason: HOSPADM

## 2019-01-01 RX ORDER — SODIUM CHLORIDE 9 MG/ML
75 INJECTION, SOLUTION INTRAVENOUS CONTINUOUS
Status: DISCONTINUED | OUTPATIENT
Start: 2019-01-01 | End: 2019-01-01

## 2019-01-01 RX ORDER — POLYETHYLENE GLYCOL 3350 17 G/17G
17 POWDER, FOR SOLUTION ORAL DAILY
Status: DISCONTINUED | OUTPATIENT
Start: 2019-01-01 | End: 2019-01-01 | Stop reason: HOSPADM

## 2019-01-01 RX ORDER — HALOPERIDOL 2 MG/ML
2 SOLUTION ORAL
Status: DISCONTINUED | OUTPATIENT
Start: 2019-01-01 | End: 2019-01-01 | Stop reason: HOSPADM

## 2019-01-01 RX ORDER — SODIUM CHLORIDE 9 MG/ML
75 INJECTION, SOLUTION INTRAVENOUS CONTINUOUS
Status: DISPENSED | OUTPATIENT
Start: 2019-01-01 | End: 2019-01-01

## 2019-01-01 RX ORDER — DIPHENHYDRAMINE HYDROCHLORIDE 50 MG/ML
12.5 INJECTION, SOLUTION INTRAMUSCULAR; INTRAVENOUS AS NEEDED
Status: DISCONTINUED | OUTPATIENT
Start: 2019-01-01 | End: 2019-01-01

## 2019-01-01 RX ORDER — ONDANSETRON 2 MG/ML
4 INJECTION INTRAMUSCULAR; INTRAVENOUS
Status: ACTIVE | OUTPATIENT
Start: 2019-01-01 | End: 2019-01-01

## 2019-01-01 RX ORDER — AMOXICILLIN 250 MG
1 CAPSULE ORAL 2 TIMES DAILY
Status: DISCONTINUED | OUTPATIENT
Start: 2019-01-01 | End: 2019-01-01 | Stop reason: HOSPADM

## 2019-01-01 RX ORDER — ACETAMINOPHEN 325 MG/1
650 TABLET ORAL EVERY 8 HOURS
Status: DISCONTINUED | OUTPATIENT
Start: 2019-01-01 | End: 2019-01-01

## 2019-01-01 RX ORDER — ONDANSETRON 2 MG/ML
4 INJECTION INTRAMUSCULAR; INTRAVENOUS AS NEEDED
Status: DISCONTINUED | OUTPATIENT
Start: 2019-01-01 | End: 2019-01-01 | Stop reason: HOSPADM

## 2019-01-01 RX ORDER — ACETAMINOPHEN 650 MG/1
650 SUPPOSITORY RECTAL
Status: DISCONTINUED | OUTPATIENT
Start: 2019-01-01 | End: 2019-01-01 | Stop reason: HOSPADM

## 2019-01-01 RX ORDER — FENTANYL CITRATE 50 UG/ML
25 INJECTION, SOLUTION INTRAMUSCULAR; INTRAVENOUS
Status: DISCONTINUED | OUTPATIENT
Start: 2019-01-01 | End: 2019-01-01

## 2019-01-01 RX ORDER — FENTANYL CITRATE 50 UG/ML
25 INJECTION, SOLUTION INTRAMUSCULAR; INTRAVENOUS
Status: DISCONTINUED | OUTPATIENT
Start: 2019-01-01 | End: 2019-01-01 | Stop reason: HOSPADM

## 2019-01-01 RX ORDER — LIDOCAINE HYDROCHLORIDE 20 MG/ML
INJECTION, SOLUTION EPIDURAL; INFILTRATION; INTRACAUDAL; PERINEURAL AS NEEDED
Status: DISCONTINUED | OUTPATIENT
Start: 2019-01-01 | End: 2019-01-01 | Stop reason: HOSPADM

## 2019-01-01 RX ORDER — FERROUS SULFATE, DRIED 160(50) MG
1 TABLET, EXTENDED RELEASE ORAL
Qty: 90 TAB | Refills: 0 | Status: SHIPPED
Start: 2019-01-01

## 2019-01-01 RX ORDER — SODIUM CHLORIDE, SODIUM LACTATE, POTASSIUM CHLORIDE, CALCIUM CHLORIDE 600; 310; 30; 20 MG/100ML; MG/100ML; MG/100ML; MG/100ML
100 INJECTION, SOLUTION INTRAVENOUS CONTINUOUS
Status: DISCONTINUED | OUTPATIENT
Start: 2019-01-01 | End: 2019-01-01

## 2019-01-01 RX ORDER — MIDAZOLAM HYDROCHLORIDE 1 MG/ML
0.5 INJECTION, SOLUTION INTRAMUSCULAR; INTRAVENOUS
Status: DISCONTINUED | OUTPATIENT
Start: 2019-01-01 | End: 2019-01-01 | Stop reason: HOSPADM

## 2019-01-01 RX ORDER — LABETALOL HYDROCHLORIDE 5 MG/ML
20 INJECTION, SOLUTION INTRAVENOUS
Status: CANCELLED | OUTPATIENT
Start: 2019-01-01

## 2019-01-01 RX ORDER — KETOROLAC TROMETHAMINE 30 MG/ML
30 INJECTION, SOLUTION INTRAMUSCULAR; INTRAVENOUS
Status: DISCONTINUED | OUTPATIENT
Start: 2019-01-01 | End: 2019-01-01 | Stop reason: HOSPADM

## 2019-01-01 RX ORDER — AMOXICILLIN 250 MG
1 CAPSULE ORAL 2 TIMES DAILY
Status: DISCONTINUED | OUTPATIENT
Start: 2019-01-01 | End: 2019-01-01

## 2019-01-01 RX ORDER — MORPHINE SULFATE 10 MG/ML
10 INJECTION, SOLUTION INTRAMUSCULAR; INTRAVENOUS
Status: DISCONTINUED | OUTPATIENT
Start: 2019-01-01 | End: 2019-01-01 | Stop reason: HOSPADM

## 2019-01-01 RX ORDER — ROCURONIUM BROMIDE 10 MG/ML
INJECTION, SOLUTION INTRAVENOUS AS NEEDED
Status: DISCONTINUED | OUTPATIENT
Start: 2019-01-01 | End: 2019-01-01 | Stop reason: HOSPADM

## 2019-01-01 RX ORDER — MORPHINE SULFATE 2 MG/ML
2 INJECTION, SOLUTION INTRAMUSCULAR; INTRAVENOUS EVERY 4 HOURS
Status: DISCONTINUED | OUTPATIENT
Start: 2019-01-01 | End: 2019-01-01

## 2019-01-01 RX ORDER — ONDANSETRON 2 MG/ML
4 INJECTION INTRAMUSCULAR; INTRAVENOUS AS NEEDED
Status: DISCONTINUED | OUTPATIENT
Start: 2019-01-01 | End: 2019-01-01

## 2019-01-01 RX ORDER — CEFAZOLIN SODIUM/WATER 2 G/20 ML
2 SYRINGE (ML) INTRAVENOUS EVERY 8 HOURS
Status: DISCONTINUED | OUTPATIENT
Start: 2019-01-01 | End: 2019-01-01 | Stop reason: DRUGHIGH

## 2019-01-01 RX ORDER — KETOROLAC TROMETHAMINE 30 MG/ML
30 INJECTION, SOLUTION INTRAMUSCULAR; INTRAVENOUS
Status: DISPENSED | OUTPATIENT
Start: 2019-01-01 | End: 2019-01-01

## 2019-01-01 RX ORDER — ACETAMINOPHEN 325 MG/1
650 TABLET ORAL ONCE
Status: DISCONTINUED | OUTPATIENT
Start: 2019-01-01 | End: 2019-01-01 | Stop reason: HOSPADM

## 2019-01-01 RX ORDER — ASPIRIN 81 MG/1
81 TABLET ORAL 2 TIMES DAILY
Status: DISCONTINUED | OUTPATIENT
Start: 2019-01-01 | End: 2019-01-01

## 2019-01-01 RX ORDER — AMLODIPINE BESYLATE 5 MG/1
10 TABLET ORAL DAILY
Status: DISCONTINUED | OUTPATIENT
Start: 2019-01-01 | End: 2019-01-01 | Stop reason: HOSPADM

## 2019-01-01 RX ORDER — FERROUS SULFATE, DRIED 160(50) MG
1 TABLET, EXTENDED RELEASE ORAL
Status: DISCONTINUED | OUTPATIENT
Start: 2019-01-01 | End: 2019-01-01

## 2019-01-01 RX ORDER — ACETAMINOPHEN 500 MG
1000 TABLET ORAL EVERY 8 HOURS
Status: DISCONTINUED | OUTPATIENT
Start: 2019-01-01 | End: 2019-01-01

## 2019-01-01 RX ORDER — HYDROMORPHONE HYDROCHLORIDE 1 MG/ML
0.2 INJECTION, SOLUTION INTRAMUSCULAR; INTRAVENOUS; SUBCUTANEOUS
Status: ACTIVE | OUTPATIENT
Start: 2019-01-01 | End: 2019-01-01

## 2019-01-01 RX ORDER — SODIUM CHLORIDE 9 MG/ML
125 INJECTION, SOLUTION INTRAVENOUS CONTINUOUS
Status: DISPENSED | OUTPATIENT
Start: 2019-01-01 | End: 2019-01-01

## 2019-01-01 RX ORDER — MORPHINE SULFATE 2 MG/ML
2 INJECTION, SOLUTION INTRAMUSCULAR; INTRAVENOUS ONCE
Status: COMPLETED | OUTPATIENT
Start: 2019-01-01 | End: 2019-01-01

## 2019-01-01 RX ORDER — ONDANSETRON 2 MG/ML
4 INJECTION INTRAMUSCULAR; INTRAVENOUS
Status: COMPLETED | OUTPATIENT
Start: 2019-01-01 | End: 2019-01-01

## 2019-01-01 RX ORDER — MORPHINE SULFATE 2 MG/ML
2 INJECTION, SOLUTION INTRAMUSCULAR; INTRAVENOUS
Status: DISCONTINUED | OUTPATIENT
Start: 2019-01-01 | End: 2019-01-01 | Stop reason: HOSPADM

## 2019-01-01 RX ORDER — SODIUM CHLORIDE 0.9 % (FLUSH) 0.9 %
5-40 SYRINGE (ML) INJECTION EVERY 8 HOURS
Status: DISCONTINUED | OUTPATIENT
Start: 2019-01-01 | End: 2019-01-01

## 2019-01-01 RX ORDER — ACETAMINOPHEN 325 MG/1
650 TABLET ORAL
Status: COMPLETED | OUTPATIENT
Start: 2019-01-01 | End: 2019-01-01

## 2019-01-01 RX ORDER — SIMVASTATIN 20 MG/1
20 TABLET, FILM COATED ORAL
Status: DISCONTINUED | OUTPATIENT
Start: 2019-01-01 | End: 2019-01-01

## 2019-01-01 RX ORDER — ACETAMINOPHEN 325 MG/1
650 TABLET ORAL
Status: DISCONTINUED | OUTPATIENT
Start: 2019-01-01 | End: 2019-01-01 | Stop reason: HOSPADM

## 2019-01-01 RX ORDER — SUCCINYLCHOLINE CHLORIDE 20 MG/ML
INJECTION INTRAMUSCULAR; INTRAVENOUS AS NEEDED
Status: DISCONTINUED | OUTPATIENT
Start: 2019-01-01 | End: 2019-01-01 | Stop reason: HOSPADM

## 2019-01-01 RX ORDER — TRANEXAMIC ACID 100 MG/ML
INJECTION, SOLUTION INTRAVENOUS AS NEEDED
Status: DISCONTINUED | OUTPATIENT
Start: 2019-01-01 | End: 2019-01-01 | Stop reason: HOSPADM

## 2019-01-01 RX ORDER — HYDRALAZINE HYDROCHLORIDE 20 MG/ML
10 INJECTION INTRAMUSCULAR; INTRAVENOUS
Status: DISCONTINUED | OUTPATIENT
Start: 2019-01-01 | End: 2019-01-01 | Stop reason: HOSPADM

## 2019-01-01 RX ORDER — LORAZEPAM 2 MG/ML
1 INJECTION INTRAMUSCULAR EVERY 4 HOURS
Status: DISCONTINUED | OUTPATIENT
Start: 2019-01-01 | End: 2019-01-01

## 2019-01-01 RX ADMIN — FENTANYL CITRATE 25 MCG: 50 INJECTION, SOLUTION INTRAMUSCULAR; INTRAVENOUS at 08:33

## 2019-01-01 RX ADMIN — GLYCOPYRROLATE 0.2 MG: 0.2 INJECTION INTRAMUSCULAR; INTRAVENOUS at 12:46

## 2019-01-01 RX ADMIN — Medication 10 ML: at 18:21

## 2019-01-01 RX ADMIN — ENOXAPARIN SODIUM 30 MG: 30 INJECTION SUBCUTANEOUS at 09:06

## 2019-01-01 RX ADMIN — ACETAMINOPHEN 650 MG: 325 TABLET ORAL at 23:36

## 2019-01-01 RX ADMIN — POLYETHYLENE GLYCOL 3350 17 G: 17 POWDER, FOR SOLUTION ORAL at 10:46

## 2019-01-01 RX ADMIN — TRAMADOL HYDROCHLORIDE 50 MG: 50 TABLET ORAL at 20:40

## 2019-01-01 RX ADMIN — PROPOFOL 50 MG: 10 INJECTION, EMULSION INTRAVENOUS at 16:02

## 2019-01-01 RX ADMIN — KETOROLAC TROMETHAMINE 30 MG: 30 INJECTION, SOLUTION INTRAMUSCULAR at 09:30

## 2019-01-01 RX ADMIN — ASPIRIN 81 MG: 81 TABLET, COATED ORAL at 21:29

## 2019-01-01 RX ADMIN — SODIUM CHLORIDE 4 MCG: 900 INJECTION, SOLUTION INTRAVENOUS at 15:45

## 2019-01-01 RX ADMIN — MORPHINE SULFATE 2 MG: 2 INJECTION, SOLUTION INTRAMUSCULAR; INTRAVENOUS at 19:20

## 2019-01-01 RX ADMIN — Medication 10 ML: at 20:51

## 2019-01-01 RX ADMIN — OYSTER SHELL CALCIUM WITH VITAMIN D 1 TABLET: 500; 200 TABLET, FILM COATED ORAL at 12:02

## 2019-01-01 RX ADMIN — METOPROLOL TARTRATE 50 MG: 50 TABLET ORAL at 09:10

## 2019-01-01 RX ADMIN — HYDRALAZINE HYDROCHLORIDE 10 MG: 20 INJECTION INTRAMUSCULAR; INTRAVENOUS at 16:26

## 2019-01-01 RX ADMIN — Medication 5 ML: at 19:00

## 2019-01-01 RX ADMIN — CEFAZOLIN 1 G: 1 INJECTION, POWDER, FOR SOLUTION INTRAMUSCULAR; INTRAVENOUS at 03:16

## 2019-01-01 RX ADMIN — SENNOSIDES, DOCUSATE SODIUM 1 TABLET: 50; 8.6 TABLET, FILM COATED ORAL at 17:52

## 2019-01-01 RX ADMIN — Medication 10 ML: at 06:47

## 2019-01-01 RX ADMIN — METOPROLOL TARTRATE 50 MG: 50 TABLET ORAL at 21:17

## 2019-01-01 RX ADMIN — SODIUM CHLORIDE, SODIUM LACTATE, POTASSIUM CHLORIDE, AND CALCIUM CHLORIDE: 600; 310; 30; 20 INJECTION, SOLUTION INTRAVENOUS at 15:53

## 2019-01-01 RX ADMIN — KETAMINE HYDROCHLORIDE 25 MG: 10 INJECTION, SOLUTION INTRAMUSCULAR; INTRAVENOUS at 08:33

## 2019-01-01 RX ADMIN — LEVOFLOXACIN 750 MG: 5 INJECTION, SOLUTION INTRAVENOUS at 10:28

## 2019-01-01 RX ADMIN — CASTOR OIL AND BALSAM, PERU: 788; 87 OINTMENT TOPICAL at 09:51

## 2019-01-01 RX ADMIN — GLYCOPYRROLATE 0.2 MG: 0.2 INJECTION INTRAMUSCULAR; INTRAVENOUS at 07:36

## 2019-01-01 RX ADMIN — POLYETHYLENE GLYCOL 3350 17 G: 17 POWDER, FOR SOLUTION ORAL at 09:23

## 2019-01-01 RX ADMIN — METOPROLOL TARTRATE 50 MG: 50 TABLET ORAL at 17:00

## 2019-01-01 RX ADMIN — OYSTER SHELL CALCIUM WITH VITAMIN D 1 TABLET: 500; 200 TABLET, FILM COATED ORAL at 10:28

## 2019-01-01 RX ADMIN — ASPIRIN 81 MG: 81 TABLET, COATED ORAL at 21:40

## 2019-01-01 RX ADMIN — POLYETHYLENE GLYCOL 3350 17 G: 17 POWDER, FOR SOLUTION ORAL at 10:00

## 2019-01-01 RX ADMIN — OYSTER SHELL CALCIUM WITH VITAMIN D 1 TABLET: 500; 200 TABLET, FILM COATED ORAL at 15:09

## 2019-01-01 RX ADMIN — PIPERACILLIN AND TAZOBACTAM 3.38 G: 3; .375 INJECTION, POWDER, LYOPHILIZED, FOR SOLUTION INTRAVENOUS at 09:14

## 2019-01-01 RX ADMIN — METOPROLOL TARTRATE 50 MG: 50 TABLET ORAL at 11:02

## 2019-01-01 RX ADMIN — KETOROLAC TROMETHAMINE 30 MG: 30 INJECTION, SOLUTION INTRAMUSCULAR at 04:02

## 2019-01-01 RX ADMIN — OYSTER SHELL CALCIUM WITH VITAMIN D 1 TABLET: 500; 200 TABLET, FILM COATED ORAL at 06:39

## 2019-01-01 RX ADMIN — QUETIAPINE FUMARATE 25 MG: 25 TABLET ORAL at 21:12

## 2019-01-01 RX ADMIN — Medication 10 ML: at 05:16

## 2019-01-01 RX ADMIN — Medication 10 ML: at 14:02

## 2019-01-01 RX ADMIN — METOPROLOL TARTRATE 50 MG: 50 TABLET ORAL at 05:44

## 2019-01-01 RX ADMIN — OYSTER SHELL CALCIUM WITH VITAMIN D 1 TABLET: 500; 200 TABLET, FILM COATED ORAL at 12:42

## 2019-01-01 RX ADMIN — ASPIRIN 81 MG: 81 TABLET, COATED ORAL at 21:16

## 2019-01-01 RX ADMIN — Medication 10 ML: at 05:04

## 2019-01-01 RX ADMIN — ENOXAPARIN SODIUM 30 MG: 30 INJECTION SUBCUTANEOUS at 10:52

## 2019-01-01 RX ADMIN — SENNOSIDES, DOCUSATE SODIUM 1 TABLET: 50; 8.6 TABLET, FILM COATED ORAL at 10:52

## 2019-01-01 RX ADMIN — OYSTER SHELL CALCIUM WITH VITAMIN D 1 TABLET: 500; 200 TABLET, FILM COATED ORAL at 11:48

## 2019-01-01 RX ADMIN — HYDRALAZINE HYDROCHLORIDE 10 MG: 20 INJECTION INTRAMUSCULAR; INTRAVENOUS at 03:58

## 2019-01-01 RX ADMIN — SENNOSIDES, DOCUSATE SODIUM 1 TABLET: 50; 8.6 TABLET, FILM COATED ORAL at 09:48

## 2019-01-01 RX ADMIN — CASTOR OIL AND BALSAM, PERU: 788; 87 OINTMENT TOPICAL at 19:24

## 2019-01-01 RX ADMIN — ACETAMINOPHEN 500 MG: 500 TABLET ORAL at 10:09

## 2019-01-01 RX ADMIN — ACETAMINOPHEN 650 MG: 325 TABLET ORAL at 07:31

## 2019-01-01 RX ADMIN — Medication 10 ML: at 22:24

## 2019-01-01 RX ADMIN — LORAZEPAM 1 MG: 2 INJECTION INTRAMUSCULAR; INTRAVENOUS at 04:10

## 2019-01-01 RX ADMIN — Medication 10 ML: at 22:05

## 2019-01-01 RX ADMIN — MORPHINE SULFATE 4 MG: 2 INJECTION, SOLUTION INTRAMUSCULAR; INTRAVENOUS at 10:11

## 2019-01-01 RX ADMIN — OYSTER SHELL CALCIUM WITH VITAMIN D 1 TABLET: 500; 200 TABLET, FILM COATED ORAL at 09:03

## 2019-01-01 RX ADMIN — CASTOR OIL AND BALSAM, PERU: 788; 87 OINTMENT TOPICAL at 20:23

## 2019-01-01 RX ADMIN — METOPROLOL TARTRATE 50 MG: 50 TABLET ORAL at 10:10

## 2019-01-01 RX ADMIN — Medication 2 G: at 16:42

## 2019-01-01 RX ADMIN — Medication 10 ML: at 21:26

## 2019-01-01 RX ADMIN — ASPIRIN 81 MG: 81 TABLET, COATED ORAL at 21:30

## 2019-01-01 RX ADMIN — ASPIRIN 81 MG: 81 TABLET, COATED ORAL at 21:18

## 2019-01-01 RX ADMIN — GLYCOPYRROLATE 0.2 MG: 0.2 INJECTION, SOLUTION INTRAMUSCULAR; INTRAVENOUS at 09:43

## 2019-01-01 RX ADMIN — METOPROLOL TARTRATE 50 MG: 50 TABLET ORAL at 10:52

## 2019-01-01 RX ADMIN — METOPROLOL TARTRATE 50 MG: 50 TABLET ORAL at 17:10

## 2019-01-01 RX ADMIN — ASPIRIN 81 MG: 81 TABLET, COATED ORAL at 10:46

## 2019-01-01 RX ADMIN — OYSTER SHELL CALCIUM WITH VITAMIN D 1 TABLET: 500; 200 TABLET, FILM COATED ORAL at 16:30

## 2019-01-01 RX ADMIN — SODIUM CHLORIDE 50 ML/HR: 900 INJECTION, SOLUTION INTRAVENOUS at 22:18

## 2019-01-01 RX ADMIN — CASTOR OIL AND BALSAM, PERU: 788; 87 OINTMENT TOPICAL at 17:50

## 2019-01-01 RX ADMIN — OYSTER SHELL CALCIUM WITH VITAMIN D 1 TABLET: 500; 200 TABLET, FILM COATED ORAL at 09:57

## 2019-01-01 RX ADMIN — POLYETHYLENE GLYCOL 3350 17 G: 17 POWDER, FOR SOLUTION ORAL at 10:19

## 2019-01-01 RX ADMIN — CEFAZOLIN 1 G: 1 INJECTION, POWDER, FOR SOLUTION INTRAMUSCULAR; INTRAVENOUS at 16:30

## 2019-01-01 RX ADMIN — HYDRALAZINE HYDROCHLORIDE 10 MG: 20 INJECTION INTRAMUSCULAR; INTRAVENOUS at 02:04

## 2019-01-01 RX ADMIN — POLYETHYLENE GLYCOL 3350 17 G: 17 POWDER, FOR SOLUTION ORAL at 09:03

## 2019-01-01 RX ADMIN — PIPERACILLIN AND TAZOBACTAM 3.38 G: 3; .375 INJECTION, POWDER, LYOPHILIZED, FOR SOLUTION INTRAVENOUS at 10:49

## 2019-01-01 RX ADMIN — SODIUM CHLORIDE 75 ML/HR: 900 INJECTION, SOLUTION INTRAVENOUS at 02:02

## 2019-01-01 RX ADMIN — CASTOR OIL AND BALSAM, PERU: 788; 87 OINTMENT TOPICAL at 19:50

## 2019-01-01 RX ADMIN — MORPHINE SULFATE 2 MG: 2 INJECTION, SOLUTION INTRAMUSCULAR; INTRAVENOUS at 22:16

## 2019-01-01 RX ADMIN — DEXAMETHASONE SODIUM PHOSPHATE 4 MG: 4 INJECTION, SOLUTION INTRAMUSCULAR; INTRAVENOUS at 16:14

## 2019-01-01 RX ADMIN — MORPHINE SULFATE 2 MG: 2 INJECTION, SOLUTION INTRAMUSCULAR; INTRAVENOUS at 09:27

## 2019-01-01 RX ADMIN — ACETAMINOPHEN 500 MG: 500 TABLET ORAL at 09:53

## 2019-01-01 RX ADMIN — CASTOR OIL AND BALSAM, PERU: 788; 87 OINTMENT TOPICAL at 17:33

## 2019-01-01 RX ADMIN — METOPROLOL TARTRATE 50 MG: 50 TABLET ORAL at 10:19

## 2019-01-01 RX ADMIN — Medication 10 ML: at 21:30

## 2019-01-01 RX ADMIN — GLYCOPYRROLATE 0.2 MG: 0.2 INJECTION INTRAMUSCULAR; INTRAVENOUS at 19:15

## 2019-01-01 RX ADMIN — QUETIAPINE FUMARATE 25 MG: 25 TABLET ORAL at 20:55

## 2019-01-01 RX ADMIN — METOPROLOL TARTRATE 50 MG: 50 TABLET ORAL at 21:18

## 2019-01-01 RX ADMIN — LEVOFLOXACIN 750 MG: 5 INJECTION, SOLUTION INTRAVENOUS at 09:11

## 2019-01-01 RX ADMIN — Medication 10 ML: at 05:44

## 2019-01-01 RX ADMIN — Medication 10 ML: at 05:27

## 2019-01-01 RX ADMIN — OYSTER SHELL CALCIUM WITH VITAMIN D 1 TABLET: 500; 200 TABLET, FILM COATED ORAL at 07:11

## 2019-01-01 RX ADMIN — SODIUM CHLORIDE 75 ML/HR: 900 INJECTION, SOLUTION INTRAVENOUS at 03:20

## 2019-01-01 RX ADMIN — Medication 10 ML: at 15:26

## 2019-01-01 RX ADMIN — ROCURONIUM BROMIDE 20 MG: 10 SOLUTION INTRAVENOUS at 16:02

## 2019-01-01 RX ADMIN — GLYCOPYRROLATE 0.2 MG: 0.2 INJECTION, SOLUTION INTRAMUSCULAR; INTRAVENOUS at 01:05

## 2019-01-01 RX ADMIN — HYDRALAZINE HYDROCHLORIDE 10 MG: 20 INJECTION INTRAMUSCULAR; INTRAVENOUS at 04:23

## 2019-01-01 RX ADMIN — Medication 10 ML: at 15:17

## 2019-01-01 RX ADMIN — OYSTER SHELL CALCIUM WITH VITAMIN D 1 TABLET: 500; 200 TABLET, FILM COATED ORAL at 16:24

## 2019-01-01 RX ADMIN — GLYCOPYRROLATE 0.2 MG: 0.2 INJECTION, SOLUTION INTRAMUSCULAR; INTRAVENOUS at 07:10

## 2019-01-01 RX ADMIN — SODIUM CHLORIDE 125 ML/HR: 900 INJECTION, SOLUTION INTRAVENOUS at 21:57

## 2019-01-01 RX ADMIN — AMLODIPINE BESYLATE 2.5 MG: 5 TABLET ORAL at 10:52

## 2019-01-01 RX ADMIN — ALBUMIN (HUMAN) 250 ML: 12.5 INJECTION, SOLUTION INTRAVENOUS at 11:18

## 2019-01-01 RX ADMIN — OYSTER SHELL CALCIUM WITH VITAMIN D 1 TABLET: 500; 200 TABLET, FILM COATED ORAL at 08:00

## 2019-01-01 RX ADMIN — PROPOFOL 30 MG: 10 INJECTION, EMULSION INTRAVENOUS at 16:03

## 2019-01-01 RX ADMIN — Medication 5 ML: at 14:03

## 2019-01-01 RX ADMIN — OYSTER SHELL CALCIUM WITH VITAMIN D 1 TABLET: 500; 200 TABLET, FILM COATED ORAL at 09:43

## 2019-01-01 RX ADMIN — LORAZEPAM 1 MG: 2 INJECTION INTRAMUSCULAR at 09:25

## 2019-01-01 RX ADMIN — CEFAZOLIN 1 G: 1 INJECTION, POWDER, FOR SOLUTION INTRAMUSCULAR; INTRAVENOUS at 15:25

## 2019-01-01 RX ADMIN — DEXMEDETOMIDINE HYDROCHLORIDE 12 MCG: 100 INJECTION, SOLUTION, CONCENTRATE INTRAVENOUS at 09:11

## 2019-01-01 RX ADMIN — OYSTER SHELL CALCIUM WITH VITAMIN D 1 TABLET: 500; 200 TABLET, FILM COATED ORAL at 09:33

## 2019-01-01 RX ADMIN — LORAZEPAM 1 MG: 2 INJECTION INTRAMUSCULAR at 04:18

## 2019-01-01 RX ADMIN — OYSTER SHELL CALCIUM WITH VITAMIN D 1 TABLET: 500; 200 TABLET, FILM COATED ORAL at 18:30

## 2019-01-01 RX ADMIN — ACETAMINOPHEN 650 MG: 325 TABLET ORAL at 21:50

## 2019-01-01 RX ADMIN — Medication 10 ML: at 04:07

## 2019-01-01 RX ADMIN — LORAZEPAM 1 MG: 2 INJECTION INTRAMUSCULAR; INTRAVENOUS at 12:46

## 2019-01-01 RX ADMIN — Medication 10 ML: at 06:00

## 2019-01-01 RX ADMIN — ACETAMINOPHEN 650 MG: 325 TABLET ORAL at 14:30

## 2019-01-01 RX ADMIN — OYSTER SHELL CALCIUM WITH VITAMIN D 1 TABLET: 500; 200 TABLET, FILM COATED ORAL at 17:32

## 2019-01-01 RX ADMIN — ASPIRIN 81 MG: 81 TABLET, COATED ORAL at 10:52

## 2019-01-01 RX ADMIN — Medication 10 ML: at 06:46

## 2019-01-01 RX ADMIN — SENNOSIDES, DOCUSATE SODIUM 1 TABLET: 50; 8.6 TABLET, FILM COATED ORAL at 17:32

## 2019-01-01 RX ADMIN — CASTOR OIL AND BALSAM, PERU: 788; 87 OINTMENT TOPICAL at 10:00

## 2019-01-01 RX ADMIN — LORAZEPAM 1 MG: 2 INJECTION INTRAMUSCULAR at 19:00

## 2019-01-01 RX ADMIN — Medication 10 ML: at 05:10

## 2019-01-01 RX ADMIN — OYSTER SHELL CALCIUM WITH VITAMIN D 1 TABLET: 500; 200 TABLET, FILM COATED ORAL at 18:12

## 2019-01-01 RX ADMIN — PIPERACILLIN AND TAZOBACTAM 3.38 G: 3; .375 INJECTION, POWDER, LYOPHILIZED, FOR SOLUTION INTRAVENOUS at 09:00

## 2019-01-01 RX ADMIN — QUETIAPINE FUMARATE 25 MG: 25 TABLET ORAL at 21:16

## 2019-01-01 RX ADMIN — ENOXAPARIN SODIUM 40 MG: 40 INJECTION SUBCUTANEOUS at 21:24

## 2019-01-01 RX ADMIN — CEFAZOLIN 1 G: 1 INJECTION, POWDER, FOR SOLUTION INTRAMUSCULAR; INTRAVENOUS at 03:57

## 2019-01-01 RX ADMIN — CEFAZOLIN 1 G: 1 INJECTION, POWDER, FOR SOLUTION INTRAMUSCULAR; INTRAVENOUS at 18:23

## 2019-01-01 RX ADMIN — QUETIAPINE FUMARATE 25 MG: 25 TABLET ORAL at 21:18

## 2019-01-01 RX ADMIN — CASTOR OIL AND BALSAM, PERU: 788; 87 OINTMENT TOPICAL at 12:46

## 2019-01-01 RX ADMIN — LORAZEPAM 1 MG: 2 INJECTION INTRAMUSCULAR; INTRAVENOUS at 07:23

## 2019-01-01 RX ADMIN — PROPOFOL 20 MG: 10 INJECTION, EMULSION INTRAVENOUS at 15:49

## 2019-01-01 RX ADMIN — SENNOSIDES, DOCUSATE SODIUM 1 TABLET: 50; 8.6 TABLET, FILM COATED ORAL at 16:24

## 2019-01-01 RX ADMIN — LEVOFLOXACIN 750 MG: 750 TABLET, FILM COATED ORAL at 11:04

## 2019-01-01 RX ADMIN — HYDRALAZINE HYDROCHLORIDE 10 MG: 20 INJECTION INTRAMUSCULAR; INTRAVENOUS at 19:31

## 2019-01-01 RX ADMIN — CASTOR OIL AND BALSAM, PERU: 788; 87 OINTMENT TOPICAL at 16:24

## 2019-01-01 RX ADMIN — METOPROLOL TARTRATE 50 MG: 50 TABLET ORAL at 10:00

## 2019-01-01 RX ADMIN — ENOXAPARIN SODIUM 40 MG: 30 INJECTION SUBCUTANEOUS at 09:43

## 2019-01-01 RX ADMIN — OYSTER SHELL CALCIUM WITH VITAMIN D 1 TABLET: 500; 200 TABLET, FILM COATED ORAL at 13:56

## 2019-01-01 RX ADMIN — FUROSEMIDE 20 MG: 10 INJECTION, SOLUTION INTRAMUSCULAR; INTRAVENOUS at 17:47

## 2019-01-01 RX ADMIN — PIPERACILLIN AND TAZOBACTAM 3.38 G: 3; .375 INJECTION, POWDER, LYOPHILIZED, FOR SOLUTION INTRAVENOUS at 02:02

## 2019-01-01 RX ADMIN — CASTOR OIL AND BALSAM, PERU: 788; 87 OINTMENT TOPICAL at 20:22

## 2019-01-01 RX ADMIN — LORAZEPAM 1 MG: 2 INJECTION INTRAMUSCULAR; INTRAVENOUS at 19:13

## 2019-01-01 RX ADMIN — LORAZEPAM 1 MG: 2 INJECTION INTRAMUSCULAR at 16:40

## 2019-01-01 RX ADMIN — METOPROLOL TARTRATE 50 MG: 50 TABLET ORAL at 18:30

## 2019-01-01 RX ADMIN — GLYCOPYRROLATE 0.4 MG: 0.2 INJECTION, SOLUTION INTRAMUSCULAR; INTRAVENOUS at 11:03

## 2019-01-01 RX ADMIN — Medication 5 ML: at 09:43

## 2019-01-01 RX ADMIN — OYSTER SHELL CALCIUM WITH VITAMIN D 1 TABLET: 500; 200 TABLET, FILM COATED ORAL at 16:05

## 2019-01-01 RX ADMIN — ACETAMINOPHEN 1000 MG: 500 TABLET ORAL at 13:27

## 2019-01-01 RX ADMIN — LORAZEPAM 1 MG: 2 INJECTION INTRAMUSCULAR at 01:04

## 2019-01-01 RX ADMIN — METOPROLOL TARTRATE 50 MG: 50 TABLET ORAL at 05:09

## 2019-01-01 RX ADMIN — FENTANYL CITRATE 50 MCG: 50 INJECTION, SOLUTION INTRAMUSCULAR; INTRAVENOUS at 15:47

## 2019-01-01 RX ADMIN — SENNOSIDES, DOCUSATE SODIUM 1 TABLET: 50; 8.6 TABLET, FILM COATED ORAL at 10:09

## 2019-01-01 RX ADMIN — Medication 10 ML: at 07:07

## 2019-01-01 RX ADMIN — MORPHINE SULFATE 2 MG: 2 INJECTION, SOLUTION INTRAMUSCULAR; INTRAVENOUS at 23:33

## 2019-01-01 RX ADMIN — SODIUM CHLORIDE, SODIUM LACTATE, POTASSIUM CHLORIDE, AND CALCIUM CHLORIDE 125 ML/HR: 600; 310; 30; 20 INJECTION, SOLUTION INTRAVENOUS at 14:42

## 2019-01-01 RX ADMIN — SODIUM CHLORIDE 75 ML/HR: 900 INJECTION, SOLUTION INTRAVENOUS at 15:12

## 2019-01-01 RX ADMIN — ACETAMINOPHEN 650 MG: 325 TABLET ORAL at 15:17

## 2019-01-01 RX ADMIN — Medication 80 MCG: at 11:18

## 2019-01-01 RX ADMIN — POLYETHYLENE GLYCOL 3350 17 G: 17 POWDER, FOR SOLUTION ORAL at 10:56

## 2019-01-01 RX ADMIN — MORPHINE SULFATE 2 MG: 2 INJECTION, SOLUTION INTRAMUSCULAR; INTRAVENOUS at 07:36

## 2019-01-01 RX ADMIN — ACETAMINOPHEN 1000 MG: 500 TABLET ORAL at 07:06

## 2019-01-01 RX ADMIN — OYSTER SHELL CALCIUM WITH VITAMIN D 1 TABLET: 500; 200 TABLET, FILM COATED ORAL at 07:27

## 2019-01-01 RX ADMIN — ASPIRIN 81 MG: 81 TABLET, COATED ORAL at 10:19

## 2019-01-01 RX ADMIN — OYSTER SHELL CALCIUM WITH VITAMIN D 1 TABLET: 500; 200 TABLET, FILM COATED ORAL at 16:45

## 2019-01-01 RX ADMIN — Medication 80 MCG: at 08:43

## 2019-01-01 RX ADMIN — METOPROLOL TARTRATE 50 MG: 50 TABLET ORAL at 22:03

## 2019-01-01 RX ADMIN — TRAMADOL HYDROCHLORIDE 50 MG: 50 TABLET ORAL at 13:56

## 2019-01-01 RX ADMIN — PIPERACILLIN AND TAZOBACTAM 3.38 G: 3; .375 INJECTION, POWDER, LYOPHILIZED, FOR SOLUTION INTRAVENOUS at 18:35

## 2019-01-01 RX ADMIN — GLYCOPYRROLATE 0.2 MG: 0.2 INJECTION, SOLUTION INTRAMUSCULAR; INTRAVENOUS at 19:00

## 2019-01-01 RX ADMIN — FENTANYL CITRATE 25 MCG: 50 INJECTION, SOLUTION INTRAMUSCULAR; INTRAVENOUS at 08:27

## 2019-01-01 RX ADMIN — SODIUM CHLORIDE 125 ML/HR: 900 INJECTION, SOLUTION INTRAVENOUS at 18:10

## 2019-01-01 RX ADMIN — CEFAZOLIN 1 G: 1 INJECTION, POWDER, FOR SOLUTION INTRAMUSCULAR; INTRAVENOUS at 04:06

## 2019-01-01 RX ADMIN — Medication 10 ML: at 18:26

## 2019-01-01 RX ADMIN — OYSTER SHELL CALCIUM WITH VITAMIN D 1 TABLET: 500; 200 TABLET, FILM COATED ORAL at 12:20

## 2019-01-01 RX ADMIN — MORPHINE SULFATE 1 MG: 2 INJECTION, SOLUTION INTRAMUSCULAR; INTRAVENOUS at 05:15

## 2019-01-01 RX ADMIN — ACETAMINOPHEN 1000 MG: 500 TABLET ORAL at 22:12

## 2019-01-01 RX ADMIN — ASPIRIN 81 MG: 81 TABLET, COATED ORAL at 10:00

## 2019-01-01 RX ADMIN — Medication 10 ML: at 21:29

## 2019-01-01 RX ADMIN — METOPROLOL TARTRATE 50 MG: 50 TABLET ORAL at 09:48

## 2019-01-01 RX ADMIN — ACETAMINOPHEN 650 MG: 325 TABLET ORAL at 10:11

## 2019-01-01 RX ADMIN — Medication 5 ML: at 12:48

## 2019-01-01 RX ADMIN — FUROSEMIDE 20 MG: 10 INJECTION, SOLUTION INTRAMUSCULAR; INTRAVENOUS at 09:15

## 2019-01-01 RX ADMIN — OYSTER SHELL CALCIUM WITH VITAMIN D 1 TABLET: 500; 200 TABLET, FILM COATED ORAL at 19:50

## 2019-01-01 RX ADMIN — CEFAZOLIN 1 G: 1 INJECTION, POWDER, FOR SOLUTION INTRAMUSCULAR; INTRAVENOUS at 16:23

## 2019-01-01 RX ADMIN — ACETAMINOPHEN 1000 MG: 500 TABLET ORAL at 18:09

## 2019-01-01 RX ADMIN — ENOXAPARIN SODIUM 40 MG: 40 INJECTION SUBCUTANEOUS at 19:36

## 2019-01-01 RX ADMIN — METOPROLOL TARTRATE 50 MG: 50 TABLET ORAL at 05:43

## 2019-01-01 RX ADMIN — SENNOSIDES, DOCUSATE SODIUM 1 TABLET: 50; 8.6 TABLET, FILM COATED ORAL at 18:00

## 2019-01-01 RX ADMIN — CASTOR OIL AND BALSAM, PERU: 788; 87 OINTMENT TOPICAL at 09:12

## 2019-01-01 RX ADMIN — ACETAMINOPHEN 650 MG: 325 TABLET ORAL at 17:44

## 2019-01-01 RX ADMIN — MORPHINE SULFATE 2 MG: 2 INJECTION, SOLUTION INTRAMUSCULAR; INTRAVENOUS at 11:34

## 2019-01-01 RX ADMIN — ENOXAPARIN SODIUM 40 MG: 40 INJECTION SUBCUTANEOUS at 10:31

## 2019-01-01 RX ADMIN — Medication 10 MG: at 09:21

## 2019-01-01 RX ADMIN — ROCURONIUM BROMIDE 20 MG: 10 SOLUTION INTRAVENOUS at 09:15

## 2019-01-01 RX ADMIN — POLYETHYLENE GLYCOL 3350 17 G: 17 POWDER, FOR SOLUTION ORAL at 08:55

## 2019-01-01 RX ADMIN — SENNOSIDES, DOCUSATE SODIUM 1 TABLET: 50; 8.6 TABLET, FILM COATED ORAL at 18:31

## 2019-01-01 RX ADMIN — ALBUMIN (HUMAN) 250 ML: 12.5 INJECTION, SOLUTION INTRAVENOUS at 17:32

## 2019-01-01 RX ADMIN — HYDRALAZINE HYDROCHLORIDE 10 MG: 20 INJECTION INTRAMUSCULAR; INTRAVENOUS at 14:16

## 2019-01-01 RX ADMIN — QUETIAPINE FUMARATE 25 MG: 25 TABLET ORAL at 21:17

## 2019-01-01 RX ADMIN — ACETAMINOPHEN 1000 MG: 500 TABLET ORAL at 00:35

## 2019-01-01 RX ADMIN — Medication 15 MG: at 08:42

## 2019-01-01 RX ADMIN — Medication 10 ML: at 14:00

## 2019-01-01 RX ADMIN — OYSTER SHELL CALCIUM WITH VITAMIN D 1 TABLET: 500; 200 TABLET, FILM COATED ORAL at 17:10

## 2019-01-01 RX ADMIN — ACETAMINOPHEN 1000 MG: 500 TABLET ORAL at 23:38

## 2019-01-01 RX ADMIN — GLYCOPYRROLATE 0.2 MG: 0.2 INJECTION INTRAMUSCULAR; INTRAVENOUS at 23:33

## 2019-01-01 RX ADMIN — SODIUM CHLORIDE 125 ML/HR: 900 INJECTION, SOLUTION INTRAVENOUS at 06:50

## 2019-01-01 RX ADMIN — Medication 10 ML: at 14:23

## 2019-01-01 RX ADMIN — PHENYLEPHRINE HYDROCHLORIDE 40 MCG/MIN: 10 INJECTION INTRAVENOUS at 16:03

## 2019-01-01 RX ADMIN — KETOROLAC TROMETHAMINE 30 MG: 30 INJECTION, SOLUTION INTRAMUSCULAR at 14:03

## 2019-01-01 RX ADMIN — OYSTER SHELL CALCIUM WITH VITAMIN D 1 TABLET: 500; 200 TABLET, FILM COATED ORAL at 12:44

## 2019-01-01 RX ADMIN — ACETAMINOPHEN 1000 MG: 500 TABLET ORAL at 14:33

## 2019-01-01 RX ADMIN — NEOSTIGMINE METHYLSULFATE 3 MG: 1 INJECTION, SOLUTION INTRAMUSCULAR; INTRAVENOUS; SUBCUTANEOUS at 11:03

## 2019-01-01 RX ADMIN — OYSTER SHELL CALCIUM WITH VITAMIN D 1 TABLET: 500; 200 TABLET, FILM COATED ORAL at 07:06

## 2019-01-01 RX ADMIN — POLYETHYLENE GLYCOL 3350 17 G: 17 POWDER, FOR SOLUTION ORAL at 09:48

## 2019-01-01 RX ADMIN — SENNOSIDES, DOCUSATE SODIUM 1 TABLET: 50; 8.6 TABLET, FILM COATED ORAL at 10:00

## 2019-01-01 RX ADMIN — FENTANYL CITRATE 25 MCG: 50 INJECTION, SOLUTION INTRAMUSCULAR; INTRAVENOUS at 08:53

## 2019-01-01 RX ADMIN — ACETAMINOPHEN 1000 MG: 500 TABLET ORAL at 14:10

## 2019-01-01 RX ADMIN — SENNOSIDES, DOCUSATE SODIUM 1 TABLET: 50; 8.6 TABLET, FILM COATED ORAL at 18:03

## 2019-01-01 RX ADMIN — SENNOSIDES, DOCUSATE SODIUM 1 TABLET: 50; 8.6 TABLET, FILM COATED ORAL at 09:22

## 2019-01-01 RX ADMIN — PIPERACILLIN AND TAZOBACTAM 3.38 G: 3; .375 INJECTION, POWDER, LYOPHILIZED, FOR SOLUTION INTRAVENOUS at 16:45

## 2019-01-01 RX ADMIN — SODIUM CHLORIDE 4 MCG: 900 INJECTION, SOLUTION INTRAVENOUS at 15:46

## 2019-01-01 RX ADMIN — LORAZEPAM 1 MG: 2 INJECTION INTRAMUSCULAR; INTRAVENOUS at 23:33

## 2019-01-01 RX ADMIN — Medication 5 ML: at 16:40

## 2019-01-01 RX ADMIN — HALOPERIDOL LACTATE 2 MG: 5 INJECTION, SOLUTION INTRAMUSCULAR at 14:46

## 2019-01-01 RX ADMIN — ACETAMINOPHEN 1000 MG: 500 TABLET ORAL at 07:11

## 2019-01-01 RX ADMIN — SODIUM CHLORIDE 75 ML/HR: 900 INJECTION, SOLUTION INTRAVENOUS at 13:24

## 2019-01-01 RX ADMIN — GLYCOPYRROLATE 0.2 MG: 0.2 INJECTION, SOLUTION INTRAMUSCULAR; INTRAVENOUS at 04:17

## 2019-01-01 RX ADMIN — MORPHINE SULFATE 2 MG: 2 INJECTION, SOLUTION INTRAMUSCULAR; INTRAVENOUS at 18:43

## 2019-01-01 RX ADMIN — CASTOR OIL AND BALSAM, PERU: 788; 87 OINTMENT TOPICAL at 13:28

## 2019-01-01 RX ADMIN — QUETIAPINE FUMARATE 25 MG: 25 TABLET ORAL at 21:29

## 2019-01-01 RX ADMIN — OYSTER SHELL CALCIUM WITH VITAMIN D 1 TABLET: 500; 200 TABLET, FILM COATED ORAL at 12:00

## 2019-01-01 RX ADMIN — SENNOSIDES, DOCUSATE SODIUM 1 TABLET: 50; 8.6 TABLET, FILM COATED ORAL at 09:54

## 2019-01-01 RX ADMIN — CEFAZOLIN 1 G: 1 INJECTION, POWDER, FOR SOLUTION INTRAMUSCULAR; INTRAVENOUS at 03:38

## 2019-01-01 RX ADMIN — AMLODIPINE BESYLATE 5 MG: 5 TABLET ORAL at 09:03

## 2019-01-01 RX ADMIN — MORPHINE SULFATE 2 MG: 2 INJECTION, SOLUTION INTRAMUSCULAR; INTRAVENOUS at 01:07

## 2019-01-01 RX ADMIN — ASPIRIN 81 MG: 81 TABLET, COATED ORAL at 21:12

## 2019-01-01 RX ADMIN — SODIUM CHLORIDE 1000 ML: 900 INJECTION, SOLUTION INTRAVENOUS at 17:44

## 2019-01-01 RX ADMIN — METOPROLOL TARTRATE 50 MG: 50 TABLET ORAL at 21:50

## 2019-01-01 RX ADMIN — SUCCINYLCHOLINE CHLORIDE 140 MG: 20 INJECTION, SOLUTION INTRAMUSCULAR; INTRAVENOUS at 08:34

## 2019-01-01 RX ADMIN — OYSTER SHELL CALCIUM WITH VITAMIN D 1 TABLET: 500; 200 TABLET, FILM COATED ORAL at 12:14

## 2019-01-01 RX ADMIN — METOPROLOL TARTRATE 50 MG: 50 TABLET ORAL at 19:08

## 2019-01-01 RX ADMIN — METOPROLOL TARTRATE 50 MG: 50 TABLET ORAL at 10:47

## 2019-01-01 RX ADMIN — ASPIRIN 325 MG: 325 TABLET, DELAYED RELEASE ORAL at 09:01

## 2019-01-01 RX ADMIN — Medication 10 ML: at 21:37

## 2019-01-01 RX ADMIN — Medication 10 ML: at 03:58

## 2019-01-01 RX ADMIN — LORAZEPAM 1 MG: 2 INJECTION INTRAMUSCULAR; INTRAVENOUS at 15:47

## 2019-01-01 RX ADMIN — Medication 10 MG: at 17:30

## 2019-01-01 RX ADMIN — HYDRALAZINE HYDROCHLORIDE 10 MG: 20 INJECTION INTRAMUSCULAR; INTRAVENOUS at 03:28

## 2019-01-01 RX ADMIN — METOPROLOL TARTRATE 50 MG: 50 TABLET ORAL at 09:01

## 2019-01-01 RX ADMIN — METOPROLOL TARTRATE 50 MG: 50 TABLET ORAL at 21:16

## 2019-01-01 RX ADMIN — PIPERACILLIN AND TAZOBACTAM 3.38 G: 3; .375 INJECTION, POWDER, LYOPHILIZED, FOR SOLUTION INTRAVENOUS at 10:21

## 2019-01-01 RX ADMIN — SENNOSIDES, DOCUSATE SODIUM 1 TABLET: 50; 8.6 TABLET, FILM COATED ORAL at 09:43

## 2019-01-01 RX ADMIN — PROPOFOL 20 MG: 10 INJECTION, EMULSION INTRAVENOUS at 15:48

## 2019-01-01 RX ADMIN — METOPROLOL TARTRATE 50 MG: 50 TABLET ORAL at 08:55

## 2019-01-01 RX ADMIN — Medication 10 ML: at 07:27

## 2019-01-01 RX ADMIN — CEFAZOLIN 1 G: 1 INJECTION, POWDER, FOR SOLUTION INTRAMUSCULAR; INTRAVENOUS at 03:42

## 2019-01-01 RX ADMIN — PIPERACILLIN AND TAZOBACTAM 3.38 G: 3; .375 INJECTION, POWDER, LYOPHILIZED, FOR SOLUTION INTRAVENOUS at 00:51

## 2019-01-01 RX ADMIN — MORPHINE SULFATE 2 MG: 2 INJECTION, SOLUTION INTRAMUSCULAR; INTRAVENOUS at 12:48

## 2019-01-01 RX ADMIN — PHENYLEPHRINE HYDROCHLORIDE 40 MCG/MIN: 10 INJECTION INTRAVENOUS at 09:00

## 2019-01-01 RX ADMIN — CEFAZOLIN 1 G: 1 INJECTION, POWDER, FOR SOLUTION INTRAMUSCULAR; INTRAVENOUS at 04:19

## 2019-01-01 RX ADMIN — FENTANYL CITRATE 25 MCG: 50 INJECTION, SOLUTION INTRAMUSCULAR; INTRAVENOUS at 08:49

## 2019-01-01 RX ADMIN — CEFAZOLIN 1 G: 1 INJECTION, POWDER, FOR SOLUTION INTRAMUSCULAR; INTRAVENOUS at 16:05

## 2019-01-01 RX ADMIN — MORPHINE SULFATE 2 MG: 2 INJECTION, SOLUTION INTRAMUSCULAR; INTRAVENOUS at 07:12

## 2019-01-01 RX ADMIN — OYSTER SHELL CALCIUM WITH VITAMIN D 1 TABLET: 500; 200 TABLET, FILM COATED ORAL at 12:10

## 2019-01-01 RX ADMIN — METOPROLOL TARTRATE 50 MG: 50 TABLET ORAL at 21:12

## 2019-01-01 RX ADMIN — MORPHINE SULFATE 2 MG: 2 INJECTION, SOLUTION INTRAMUSCULAR; INTRAVENOUS at 07:23

## 2019-01-01 RX ADMIN — Medication 2 G: at 16:16

## 2019-01-01 RX ADMIN — POLYETHYLENE GLYCOL 3350 17 G: 17 POWDER, FOR SOLUTION ORAL at 09:12

## 2019-01-01 RX ADMIN — MORPHINE SULFATE 2 MG: 2 INJECTION, SOLUTION INTRAMUSCULAR; INTRAVENOUS at 04:10

## 2019-01-01 RX ADMIN — OYSTER SHELL CALCIUM WITH VITAMIN D 1 TABLET: 500; 200 TABLET, FILM COATED ORAL at 16:58

## 2019-01-01 RX ADMIN — AMLODIPINE BESYLATE 10 MG: 5 TABLET ORAL at 09:43

## 2019-01-01 RX ADMIN — Medication 10 ML: at 03:17

## 2019-01-01 RX ADMIN — CASTOR OIL AND BALSAM, PERU: 788; 87 OINTMENT TOPICAL at 10:58

## 2019-01-01 RX ADMIN — METOPROLOL TARTRATE 50 MG: 50 TABLET ORAL at 21:40

## 2019-01-01 RX ADMIN — SODIUM CHLORIDE 125 ML/HR: 900 INJECTION, SOLUTION INTRAVENOUS at 14:21

## 2019-01-01 RX ADMIN — OYSTER SHELL CALCIUM WITH VITAMIN D 1 TABLET: 500; 200 TABLET, FILM COATED ORAL at 10:09

## 2019-01-01 RX ADMIN — Medication 10 ML: at 15:30

## 2019-01-01 RX ADMIN — HALOPERIDOL LACTATE 2 MG: 5 INJECTION, SOLUTION INTRAMUSCULAR at 02:04

## 2019-01-01 RX ADMIN — ENOXAPARIN SODIUM 40 MG: 40 INJECTION SUBCUTANEOUS at 18:55

## 2019-01-01 RX ADMIN — ASPIRIN 81 MG: 81 TABLET, COATED ORAL at 09:12

## 2019-01-01 RX ADMIN — ONDANSETRON HYDROCHLORIDE 4 MG: 2 INJECTION, SOLUTION INTRAMUSCULAR; INTRAVENOUS at 08:40

## 2019-01-01 RX ADMIN — OYSTER SHELL CALCIUM WITH VITAMIN D 1 TABLET: 500; 200 TABLET, FILM COATED ORAL at 12:43

## 2019-01-01 RX ADMIN — CASTOR OIL AND BALSAM, PERU: 788; 87 OINTMENT TOPICAL at 17:08

## 2019-01-01 RX ADMIN — OYSTER SHELL CALCIUM WITH VITAMIN D 1 TABLET: 500; 200 TABLET, FILM COATED ORAL at 10:52

## 2019-01-01 RX ADMIN — SENNOSIDES, DOCUSATE SODIUM 1 TABLET: 50; 8.6 TABLET, FILM COATED ORAL at 08:55

## 2019-01-01 RX ADMIN — SENNOSIDES, DOCUSATE SODIUM 1 TABLET: 50; 8.6 TABLET, FILM COATED ORAL at 10:47

## 2019-01-01 RX ADMIN — PIPERACILLIN AND TAZOBACTAM 3.38 G: 3; .375 INJECTION, POWDER, LYOPHILIZED, FOR SOLUTION INTRAVENOUS at 16:30

## 2019-01-01 RX ADMIN — ACETAMINOPHEN 650 MG: 325 TABLET, FILM COATED ORAL at 14:45

## 2019-01-01 RX ADMIN — GLYCOPYRROLATE 0.2 MG: 0.2 INJECTION, SOLUTION INTRAMUSCULAR; INTRAVENOUS at 04:00

## 2019-01-01 RX ADMIN — Medication 10 ML: at 21:41

## 2019-01-01 RX ADMIN — METOPROLOL TARTRATE 50 MG: 50 TABLET ORAL at 07:07

## 2019-01-01 RX ADMIN — SODIUM CHLORIDE, POTASSIUM CHLORIDE, SODIUM LACTATE AND CALCIUM CHLORIDE: 600; 310; 30; 20 INJECTION, SOLUTION INTRAVENOUS at 08:22

## 2019-01-01 RX ADMIN — LIDOCAINE HYDROCHLORIDE 40 MG: 20 INJECTION, SOLUTION EPIDURAL; INFILTRATION; INTRACAUDAL; PERINEURAL at 16:02

## 2019-01-01 RX ADMIN — PIPERACILLIN AND TAZOBACTAM 3.38 G: 3; .375 INJECTION, POWDER, LYOPHILIZED, FOR SOLUTION INTRAVENOUS at 00:41

## 2019-01-01 RX ADMIN — METOPROLOL TARTRATE 50 MG: 50 TABLET ORAL at 10:23

## 2019-01-01 RX ADMIN — CEFAZOLIN 1 G: 1 INJECTION, POWDER, FOR SOLUTION INTRAMUSCULAR; INTRAVENOUS at 15:59

## 2019-01-01 RX ADMIN — Medication 10 MG: at 09:55

## 2019-01-01 RX ADMIN — ENOXAPARIN SODIUM 40 MG: 40 INJECTION SUBCUTANEOUS at 20:38

## 2019-01-01 RX ADMIN — ACETAMINOPHEN 650 MG: 325 TABLET ORAL at 22:22

## 2019-01-01 RX ADMIN — OYSTER SHELL CALCIUM WITH VITAMIN D 1 TABLET: 500; 200 TABLET, FILM COATED ORAL at 11:12

## 2019-01-01 RX ADMIN — CASTOR OIL AND BALSAM, PERU: 788; 87 OINTMENT TOPICAL at 20:44

## 2019-01-01 RX ADMIN — PROPOFOL 80 MG: 10 INJECTION, EMULSION INTRAVENOUS at 08:33

## 2019-01-01 RX ADMIN — ONDANSETRON 4 MG: 2 INJECTION INTRAMUSCULAR; INTRAVENOUS at 10:12

## 2019-01-01 RX ADMIN — MORPHINE SULFATE 2 MG: 2 INJECTION, SOLUTION INTRAMUSCULAR; INTRAVENOUS at 04:18

## 2019-01-01 RX ADMIN — TETANUS TOXOID, REDUCED DIPHTHERIA TOXOID AND ACELLULAR PERTUSSIS VACCINE, ADSORBED 0.5 ML: 5; 2.5; 8; 8; 2.5 SUSPENSION INTRAMUSCULAR at 10:10

## 2019-01-01 RX ADMIN — ASPIRIN 81 MG: 81 TABLET, COATED ORAL at 08:55

## 2019-01-01 RX ADMIN — Medication 10 ML: at 21:17

## 2019-01-01 RX ADMIN — ACETAMINOPHEN 650 MG: 325 TABLET ORAL at 22:31

## 2019-01-01 RX ADMIN — PIPERACILLIN AND TAZOBACTAM 3.38 G: 3; .375 INJECTION, POWDER, LYOPHILIZED, FOR SOLUTION INTRAVENOUS at 19:31

## 2019-01-01 RX ADMIN — Medication 80 MCG: at 11:20

## 2019-01-01 RX ADMIN — ACETAMINOPHEN 1000 MG: 500 TABLET ORAL at 06:50

## 2019-01-01 RX ADMIN — SENNOSIDES, DOCUSATE SODIUM 1 TABLET: 50; 8.6 TABLET, FILM COATED ORAL at 17:10

## 2019-01-01 RX ADMIN — NEOSTIGMINE METHYLSULFATE 2 MG: 1 INJECTION, SOLUTION INTRAMUSCULAR; INTRAVENOUS; SUBCUTANEOUS at 17:52

## 2019-01-01 RX ADMIN — METOPROLOL TARTRATE 50 MG: 50 TABLET ORAL at 09:12

## 2019-01-01 RX ADMIN — POLYETHYLENE GLYCOL 3350 17 G: 17 POWDER, FOR SOLUTION ORAL at 10:31

## 2019-01-01 RX ADMIN — ACETAMINOPHEN 500 MG: 500 TABLET ORAL at 19:08

## 2019-01-01 RX ADMIN — POLYETHYLENE GLYCOL 3350 17 G: 17 POWDER, FOR SOLUTION ORAL at 09:43

## 2019-01-01 RX ADMIN — INFLUENZA VIRUS VACCINE 0.5 ML: 15; 15; 15; 15 SUSPENSION INTRAMUSCULAR at 05:16

## 2019-01-01 RX ADMIN — CEFAZOLIN 2 G: 330 INJECTION, POWDER, FOR SOLUTION INTRAMUSCULAR; INTRAVENOUS at 09:00

## 2019-01-01 RX ADMIN — ACETAMINOPHEN 650 MG: 325 TABLET ORAL at 06:39

## 2019-01-01 RX ADMIN — Medication 10 ML: at 22:00

## 2019-01-01 RX ADMIN — LORAZEPAM 1 MG: 2 INJECTION INTRAMUSCULAR; INTRAVENOUS at 22:09

## 2019-01-01 RX ADMIN — LORAZEPAM 1 MG: 2 INJECTION INTRAMUSCULAR at 07:09

## 2019-01-01 RX ADMIN — FENTANYL CITRATE 50 MCG: 50 INJECTION, SOLUTION INTRAMUSCULAR; INTRAVENOUS at 16:48

## 2019-01-01 RX ADMIN — ONDANSETRON HYDROCHLORIDE 4 MG: 2 INJECTION, SOLUTION INTRAMUSCULAR; INTRAVENOUS at 16:13

## 2019-01-01 RX ADMIN — SODIUM CHLORIDE 2 MCG: 900 INJECTION, SOLUTION INTRAVENOUS at 15:47

## 2019-01-01 RX ADMIN — OYSTER SHELL CALCIUM WITH VITAMIN D 1 TABLET: 500; 200 TABLET, FILM COATED ORAL at 19:08

## 2019-01-01 RX ADMIN — METOPROLOL TARTRATE 50 MG: 50 TABLET ORAL at 21:37

## 2019-01-01 RX ADMIN — Medication 10 ML: at 21:12

## 2019-01-01 RX ADMIN — ASPIRIN 325 MG: 325 TABLET, DELAYED RELEASE ORAL at 10:23

## 2019-01-01 RX ADMIN — LORAZEPAM 1 MG: 2 INJECTION INTRAMUSCULAR at 22:01

## 2019-01-01 RX ADMIN — VANCOMYCIN HYDROCHLORIDE 1000 MG: 1 INJECTION, POWDER, LYOPHILIZED, FOR SOLUTION INTRAVENOUS at 22:04

## 2019-01-01 RX ADMIN — ACETAMINOPHEN 1000 MG: 500 TABLET ORAL at 21:30

## 2019-01-01 RX ADMIN — Medication 10 ML: at 05:45

## 2019-01-01 RX ADMIN — OYSTER SHELL CALCIUM WITH VITAMIN D 1 TABLET: 500; 200 TABLET, FILM COATED ORAL at 10:21

## 2019-01-01 RX ADMIN — ROCURONIUM BROMIDE 10 MG: 10 SOLUTION INTRAVENOUS at 16:13

## 2019-01-01 RX ADMIN — OYSTER SHELL CALCIUM WITH VITAMIN D 1 TABLET: 500; 200 TABLET, FILM COATED ORAL at 14:35

## 2019-01-01 RX ADMIN — GLYCOPYRROLATE 0.2 MG: 0.2 INJECTION, SOLUTION INTRAMUSCULAR; INTRAVENOUS at 22:01

## 2019-01-01 RX ADMIN — GLYCOPYRROLATE 0.2 MG: 0.2 INJECTION INTRAMUSCULAR; INTRAVENOUS at 04:10

## 2019-01-01 RX ADMIN — GLYCOPYRROLATE 0.2 MG: 0.2 INJECTION, SOLUTION INTRAMUSCULAR; INTRAVENOUS at 09:25

## 2019-01-01 RX ADMIN — Medication 10 ML: at 16:32

## 2019-01-01 RX ADMIN — MORPHINE SULFATE 1 MG: 2 INJECTION, SOLUTION INTRAMUSCULAR; INTRAVENOUS at 10:33

## 2019-01-01 RX ADMIN — Medication 10 ML: at 21:16

## 2019-01-01 RX ADMIN — ACETAMINOPHEN 650 MG: 325 TABLET ORAL at 15:45

## 2019-01-01 RX ADMIN — CASTOR OIL AND BALSAM, PERU: 788; 87 OINTMENT TOPICAL at 10:22

## 2019-01-01 RX ADMIN — Medication 10 ML: at 13:29

## 2019-01-01 RX ADMIN — GLYCOPYRROLATE 0.4 MG: 0.2 INJECTION, SOLUTION INTRAMUSCULAR; INTRAVENOUS at 17:52

## 2019-01-01 RX ADMIN — OYSTER SHELL CALCIUM WITH VITAMIN D 1 TABLET: 500; 200 TABLET, FILM COATED ORAL at 12:27

## 2019-01-01 RX ADMIN — ALBUMIN (HUMAN) 250 ML: 12.5 INJECTION, SOLUTION INTRAVENOUS at 09:24

## 2019-01-01 RX ADMIN — ENOXAPARIN SODIUM 40 MG: 40 INJECTION SUBCUTANEOUS at 09:23

## 2019-01-01 RX ADMIN — TRAMADOL HYDROCHLORIDE 50 MG: 50 TABLET, FILM COATED ORAL at 04:36

## 2019-01-01 RX ADMIN — CASTOR OIL AND BALSAM, PERU: 788; 87 OINTMENT TOPICAL at 11:26

## 2019-01-01 RX ADMIN — SENNOSIDES, DOCUSATE SODIUM 1 TABLET: 50; 8.6 TABLET, FILM COATED ORAL at 09:03

## 2019-01-01 RX ADMIN — QUETIAPINE FUMARATE 25 MG: 25 TABLET ORAL at 21:30

## 2019-01-01 RX ADMIN — SENNOSIDES, DOCUSATE SODIUM 1 TABLET: 50; 8.6 TABLET, FILM COATED ORAL at 18:30

## 2019-01-01 RX ADMIN — SENNOSIDES, DOCUSATE SODIUM 1 TABLET: 50; 8.6 TABLET, FILM COATED ORAL at 09:12

## 2019-01-01 RX ADMIN — ROCURONIUM BROMIDE 20 MG: 10 SOLUTION INTRAVENOUS at 08:33

## 2019-01-01 RX ADMIN — VANCOMYCIN HYDROCHLORIDE 1500 MG: 10 INJECTION, POWDER, LYOPHILIZED, FOR SOLUTION INTRAVENOUS at 18:25

## 2019-01-01 RX ADMIN — OYSTER SHELL CALCIUM WITH VITAMIN D 1 TABLET: 500; 200 TABLET, FILM COATED ORAL at 06:18

## 2019-01-01 RX ADMIN — Medication 80 MCG: at 08:41

## 2019-01-01 RX ADMIN — Medication 10 ML: at 14:59

## 2019-01-01 RX ADMIN — ASPIRIN 81 MG: 81 TABLET, COATED ORAL at 20:55

## 2019-01-01 RX ADMIN — SENNOSIDES, DOCUSATE SODIUM 1 TABLET: 50; 8.6 TABLET, FILM COATED ORAL at 10:19

## 2019-01-01 RX ADMIN — HYDRALAZINE HYDROCHLORIDE 10 MG: 20 INJECTION INTRAMUSCULAR; INTRAVENOUS at 05:00

## 2019-01-01 RX ADMIN — GLYCOPYRROLATE 0.2 MG: 0.2 INJECTION, SOLUTION INTRAMUSCULAR; INTRAVENOUS at 16:40

## 2019-01-01 RX ADMIN — MORPHINE SULFATE 2 MG: 2 INJECTION, SOLUTION INTRAMUSCULAR; INTRAVENOUS at 22:10

## 2019-01-01 RX ADMIN — OYSTER SHELL CALCIUM WITH VITAMIN D 1 TABLET: 500; 200 TABLET, FILM COATED ORAL at 07:31

## 2019-01-01 RX ADMIN — ACETAMINOPHEN 1000 MG: 500 TABLET ORAL at 14:59

## 2019-01-01 RX ADMIN — ACETAMINOPHEN 1000 MG: 500 TABLET ORAL at 07:27

## 2019-01-01 RX ADMIN — LORAZEPAM 1 MG: 2 INJECTION INTRAMUSCULAR; INTRAVENOUS at 11:33

## 2019-01-01 RX ADMIN — OYSTER SHELL CALCIUM WITH VITAMIN D 1 TABLET: 500; 200 TABLET, FILM COATED ORAL at 13:18

## 2019-01-01 RX ADMIN — ACETAMINOPHEN 500 MG: 500 TABLET ORAL at 16:58

## 2019-01-01 RX ADMIN — PROPOFOL 30 MG: 10 INJECTION, EMULSION INTRAVENOUS at 11:20

## 2019-01-01 RX ADMIN — PIPERACILLIN AND TAZOBACTAM 3.38 G: 3; .375 INJECTION, POWDER, LYOPHILIZED, FOR SOLUTION INTRAVENOUS at 03:05

## 2019-01-01 RX ADMIN — ASPIRIN 325 MG: 325 TABLET, DELAYED RELEASE ORAL at 11:02

## 2019-01-01 RX ADMIN — ACETAMINOPHEN 650 MG: 650 SUPPOSITORY RECTAL at 22:10

## 2019-01-01 RX ADMIN — SENNOSIDES, DOCUSATE SODIUM 1 TABLET: 50; 8.6 TABLET, FILM COATED ORAL at 16:31

## 2019-01-01 RX ADMIN — Medication 10 ML: at 06:17

## 2019-01-01 RX ADMIN — LORAZEPAM 1 MG: 2 INJECTION INTRAMUSCULAR; INTRAVENOUS at 07:36

## 2019-01-01 RX ADMIN — ASPIRIN 81 MG: 81 TABLET, COATED ORAL at 09:48

## 2019-01-01 RX ADMIN — FUROSEMIDE 20 MG: 10 INJECTION, SOLUTION INTRAMUSCULAR; INTRAVENOUS at 11:07

## 2019-01-01 RX ADMIN — SENNOSIDES, DOCUSATE SODIUM 1 TABLET: 50; 8.6 TABLET, FILM COATED ORAL at 19:08

## 2019-01-01 RX ADMIN — Medication 10 ML: at 13:27

## 2019-01-01 RX ADMIN — OYSTER SHELL CALCIUM WITH VITAMIN D 1 TABLET: 500; 200 TABLET, FILM COATED ORAL at 16:40

## 2019-01-01 RX ADMIN — ACETAMINOPHEN 1000 MG: 500 TABLET ORAL at 22:14

## 2019-01-01 RX ADMIN — METOPROLOL TARTRATE 50 MG: 50 TABLET ORAL at 20:51

## 2019-01-01 RX ADMIN — PROPOFOL 20 MG: 10 INJECTION, EMULSION INTRAVENOUS at 15:47

## 2019-01-01 RX ADMIN — SODIUM CHLORIDE 500 ML: 900 INJECTION, SOLUTION INTRAVENOUS at 14:10

## 2019-01-01 RX ADMIN — METOPROLOL TARTRATE 50 MG: 50 TABLET ORAL at 21:30

## 2019-01-01 RX ADMIN — SODIUM CHLORIDE 75 ML/HR: 900 INJECTION, SOLUTION INTRAVENOUS at 12:19

## 2019-01-01 RX ADMIN — LIDOCAINE HYDROCHLORIDE 80 MG: 20 INJECTION, SOLUTION EPIDURAL; INFILTRATION; INTRACAUDAL; PERINEURAL at 08:33

## 2019-01-01 RX ADMIN — Medication 10 ML: at 14:33

## 2019-01-01 RX ADMIN — CASTOR OIL AND BALSAM, PERU: 788; 87 OINTMENT TOPICAL at 08:56

## 2019-01-01 RX ADMIN — SODIUM CHLORIDE, SODIUM LACTATE, POTASSIUM CHLORIDE, AND CALCIUM CHLORIDE 500 ML: 600; 310; 30; 20 INJECTION, SOLUTION INTRAVENOUS at 10:11

## 2019-01-01 RX ADMIN — CASTOR OIL AND BALSAM, PERU: 788; 87 OINTMENT TOPICAL at 18:00

## 2019-01-01 RX ADMIN — GLYCOPYRROLATE 0.2 MG: 0.2 INJECTION, SOLUTION INTRAMUSCULAR; INTRAVENOUS at 14:03

## 2019-01-01 RX ADMIN — DEXAMETHASONE SODIUM PHOSPHATE 4 MG: 4 INJECTION, SOLUTION INTRAMUSCULAR; INTRAVENOUS at 08:40

## 2019-01-01 RX ADMIN — CEFAZOLIN 1 G: 1 INJECTION, POWDER, FOR SOLUTION INTRAMUSCULAR; INTRAVENOUS at 03:40

## 2019-01-01 RX ADMIN — Medication 80 MCG: at 08:33

## 2019-01-01 RX ADMIN — CASTOR OIL AND BALSAM, PERU: 788; 87 OINTMENT TOPICAL at 17:13

## 2019-01-01 RX ADMIN — ACETAMINOPHEN 650 MG: 325 TABLET ORAL at 16:40

## 2019-01-01 RX ADMIN — MORPHINE SULFATE 1 MG: 2 INJECTION, SOLUTION INTRAMUSCULAR; INTRAVENOUS at 03:58

## 2019-01-01 RX ADMIN — ACETAMINOPHEN 1000 MG: 500 TABLET ORAL at 06:17

## 2019-01-01 RX ADMIN — MORPHINE SULFATE 2 MG: 2 INJECTION, SOLUTION INTRAMUSCULAR; INTRAVENOUS at 15:31

## 2019-01-01 RX ADMIN — METOPROLOL TARTRATE 50 MG: 50 TABLET ORAL at 21:29

## 2019-01-01 RX ADMIN — LORAZEPAM 1 MG: 2 INJECTION INTRAMUSCULAR; INTRAVENOUS at 15:31

## 2019-01-01 RX ADMIN — ASPIRIN 325 MG: 325 TABLET, DELAYED RELEASE ORAL at 09:11

## 2019-01-01 RX ADMIN — QUETIAPINE FUMARATE 25 MG: 25 TABLET ORAL at 21:40

## 2019-01-01 RX ADMIN — Medication 10 ML: at 14:11

## 2019-01-01 RX ADMIN — CASTOR OIL AND BALSAM, PERU: 788; 87 OINTMENT TOPICAL at 09:21

## 2019-01-01 RX ADMIN — AMLODIPINE BESYLATE 10 MG: 5 TABLET ORAL at 10:09

## 2019-01-01 RX ADMIN — ACETAMINOPHEN 1000 MG: 500 TABLET ORAL at 15:24

## 2019-01-01 RX ADMIN — Medication 10 ML: at 21:19

## 2019-01-01 RX ADMIN — CEFAZOLIN 1 G: 1 INJECTION, POWDER, FOR SOLUTION INTRAMUSCULAR; INTRAVENOUS at 03:45

## 2019-09-03 PROBLEM — S72.002A FRACTURE OF UNSPECIFIED PART OF NECK OF LEFT FEMUR, INITIAL ENCOUNTER FOR CLOSED FRACTURE (HCC): Status: ACTIVE | Noted: 2019-01-01

## 2019-09-03 NOTE — PROGRESS NOTES
Spoke with patients daughter Flor Randhawa and advised that patient will be having surgery tomorrow instead of tonight. Also, patients daughter advised that patients baseline is periodic confusion. Paged Dr. Fabien Braden to get order for tylenol in the event that patients temperature increases. 8823-Per Dr. Fabien Braden order tylenol 500mg tab Q6 for mild pain and fever.   Also, place order for CBC and BMP for 9/4/2019 at 400am

## 2019-09-03 NOTE — ED PROVIDER NOTES
80 y.o. male with past medical history significant for CAD s/p CABG, DDD, sciatica, presents via EMS with chief complaint of left hip pain following a fall. Patient states that he was walking on uneven ground this morning in his asphalt driveway, and this caused him to lose his balance and fall to the ground. Patient states that when he fell he hit his head and landed on the left side of the body. Denies LOC. After the fall he attempted to ambulate, but was unable to do so due to pain in the left hip. Patient states that he does not have any hip pain when lying stationary on the ED stretcher, but movement of the left hip causes 8/10 discomfort in that location. EMS also notes that patient has several wounds to the left arm as a result of the fall. Patient denies any dizziness, lightheadedness, or chest pain associated with the fall. He also denies pain in the left ankle, left knee, left elbow, left shoulder, right upper extremity, right lower extremity, or pain in the head. Patient states that he was in his usual state of health prior to the fall. He denies current anticoagulation therapy other than daily ASA. Patient is unsure of his tetanus status. There are no other acute medical concerns at this time. Social hx: Denies Tobacco use; Positive EtOH use (occasional beer); Denies Illicit Drug Abuse    PCP: Swati Recio MD      Note written by Jose Miguel Cantu. Gen Preston, as dictated by Talya Larsen, DO 9:32 AM     The history is provided by the patient and the EMS personnel. No  was used. Past Medical History:   Diagnosis Date    CAD (coronary artery disease)     DDD (degenerative disc disease), lumbar     Sciatica        Past Surgical History:   Procedure Laterality Date    HX CORONARY ARTERY BYPASS GRAFT           History reviewed. No pertinent family history.     Social History     Socioeconomic History    Marital status:      Spouse name: Not on file    Number of children: Not on file    Years of education: Not on file    Highest education level: Not on file   Occupational History    Not on file   Social Needs    Financial resource strain: Not on file    Food insecurity:     Worry: Not on file     Inability: Not on file    Transportation needs:     Medical: Not on file     Non-medical: Not on file   Tobacco Use    Smoking status: Never Smoker    Smokeless tobacco: Never Used   Substance and Sexual Activity    Alcohol use: Yes    Drug use: Never    Sexual activity: Not on file   Lifestyle    Physical activity:     Days per week: Not on file     Minutes per session: Not on file    Stress: Not on file   Relationships    Social connections:     Talks on phone: Not on file     Gets together: Not on file     Attends [employer] service: Not on file     Active member of club or organization: Not on file     Attends meetings of clubs or organizations: Not on file     Relationship status: Not on file    Intimate partner violence:     Fear of current or ex partner: Not on file     Emotionally abused: Not on file     Physically abused: Not on file     Forced sexual activity: Not on file   Other Topics Concern    Not on file   Social History Narrative    Not on file         ALLERGIES: Patient has no known allergies. Review of Systems   Constitutional: Negative for fever. HENT: Negative for trouble swallowing. Eyes: Negative for visual disturbance. Respiratory: Negative for cough. Cardiovascular: Negative for chest pain. Gastrointestinal: Negative for abdominal pain. Genitourinary: Negative for difficulty urinating. Musculoskeletal: Positive for arthralgias (left hip). Skin: Positive for wound (left arm). Negative for rash. Neurological: Negative for dizziness, light-headedness and headaches. Hematological: Does not bruise/bleed easily. Psychiatric/Behavioral: Negative for sleep disturbance.        Vitals:    09/03/19 1146 09/03/19 1157 09/03/19 1215 09/03/19 1230   BP:  (!) 198/95 194/82    Pulse:       Resp:       Temp:       SpO2: (!) 89% (!) 87% 94% 96%            Physical Exam   Constitutional: He is oriented to person, place, and time. He appears well-developed and well-nourished. HENT:   Head: Normocephalic. Small superficial abrasion to the right occiput. Eyes: Conjunctivae are normal.   Neck: Normal range of motion. Cardiovascular: Normal rate. Murmur heard. Systolic murmur is present with a grade of 3/6. Both feet are warm and well perfused. Pulmonary/Chest: Effort normal and breath sounds normal. No respiratory distress. Well-healed sternotomy scar. Abdominal: Soft. Bowel sounds are normal. There is no tenderness. There is no rebound and no guarding. Musculoskeletal:   Left lower extremity is slightly shorter compared with the right and seems to be externally rotated. There is no tenderness to palpation over the left hip, but with very minimal hip flexion on the left he grimaces and has significant pain. Neurological: He is alert and oriented to person, place, and time. Sensation intact in the bilateral lower extremities. Able to dorsiflex and plantar flex with equal strength. Skin: Skin is warm and dry. Skin tears and hematoma of the left elbow. Small skin tear at the medial aspect of the 5th MCP joint on the left. Old subungual hematoma on the left thumb. Psychiatric: His behavior is normal.   Nursing note and vitals reviewed. Note written by Renu Souza. Sylvester Hussein, as dictated by Grazyna Luna, DO 9:32 AM      MDM  Number of Diagnoses or Management Options  Closed fracture of left hip, initial encounter [facility]): Fall from ground level:   First degree AV block:           ED EKG interpretation:  Time 0954  Rhythm: sinus rhythm with 1st degree AV block; and regular .  Rate (approx.): 93 bpm; Axis: normal; [place] interval: prolonged; QRS interval: normal; QT/QTc: prolonged; ST/T wave: T wave inverted in inferolateral leads, but no ST-segment changes. Note written by Gordon Faye. Jaime Chambers, as dictated by Zita Pinto DO 10:08 AM     Hospitalist Tremaine for Admission  11:11 AM    ED Room Number: HV32/21  Patient Name and age:  Geraldine Calderon 80 y.o.  male  Working Diagnosis:   1. Fall from ground level    2. Closed fracture of left hip, initial encounter ([address]    3. First degree AV block      Readmission: no  Isolation Requirements:  no  Recommended Level of Care:  med/surg  Code Status:  Full Code  Department:[facility] Adult ED - 21   Other:  H/o CABG. BP high here, but says he only takes ASA. CONSULT NOTE:  11:20 AM Zita Pinto DO communicated with Dr. Malorie Houston and PATRICIA Garcia, Consults for Orthopedics via [facility] Text. Discussed available diagnostic tests and clinical findings. PATRICIA Garcia will evaluate the patient in the ED; recommends admission with hospitalist. Dr. Sanchez Hopes will plan to take patient to OR either later today or tomorrow. 11:22 AM  Patient is being admitted to the hospital.  The results of their tests and reasons for their admission have been discussed with them and/or available family. They convey agreement and understanding for the need to be admitted and for their admission diagnosis.      Procedures

## 2019-09-03 NOTE — PROGRESS NOTES
Admission Medication Reconciliation:    Information obtained from:  Patient  RxQuery data available¹:  NO    Comments/Recommendations: Updated PTA meds/reviewed patient's allergies. 1)  Mr Jerry García states he only takes an aspirin at home. When further prompted, denies any vitamins, blood pressure medicines, blood thinners, etc. These medications may have been generated from 2017 outpatient data. I have notified the admitting physician and received order to cancel the incorrectly ordered meds (Maxzide and Zocor). 2)  Medication changes (since last review): Added  - Aspirin 325 mg    Removed  - Vitamin D  - Docusate  - Nitrostat  - Xarelto  - Simvastatin  - Triamterene-HCTZ     ¹RxQuery pharmacy benefit data reflects medications filled and processed through the patient's insurance, however   this data does NOT capture whether the medication was picked up or is currently being taken by the patient. Allergies:  Patient has no known allergies. Significant PMH/Disease States:   Past Medical History:   Diagnosis Date    CAD (coronary artery disease)     DDD (degenerative disc disease), lumbar     Sciatica      Chief Complaint for this Admission:  No chief complaint on file. Prior to Admission Medications:   Prior to Admission Medications   Prescriptions Last Dose Informant Patient Reported? Taking?   aspirin delayed-release 325 mg tablet 9/3/2019 at 0800  Yes Yes   Sig: Take 325 mg by mouth daily. Facility-Administered Medications: None       Please contact the main inpatient pharmacy with any questions or concerns at [phone] and we will direct you to the clinical pharmacist covering this patient's care while in-house.    Harish Ayala

## 2019-09-03 NOTE — ED NOTES
1030:  Urinal given, voiding 400cc of clear yellow urine without difficulty. Friend at bedside. Patients left arm skin tear has been cleaned and nonadherent dressing applied. Patient instructed on care, verbalized understanding. Patient will be kept NPO.

## 2019-09-03 NOTE — CONSULTS
ORTHO CONSULT NOTE    Date of Consultation:  September 3, 2019  Referring Physician:  Trell Pedraza  CC: left hip pain    HPI:  Samantha Baez is a 80 y.o. male PMH CABG and valve replacement who c/o left hip pain s/p GLF in his neighbor's driveway today. He reports elbow wound but denies hip wound. Also denies foot numbness. At baseline, lives independently in [address] [place] [address] and ambulates with device. His daughters reside in [place] and [place].  daily. Reports orthopedic surgery decades ago but doesn't know surgeon. Past Medical History:   Diagnosis Date    CAD (coronary artery disease)     DDD (degenerative disc disease), lumbar     Sciatica       Past Surgical History:   Procedure Laterality Date    HX CORONARY ARTERY BYPASS GRAFT        History reviewed. No pertinent family history. Social History     Tobacco Use    Smoking status: Never Smoker    Smokeless tobacco: Never Used   Substance Use Topics    Alcohol use: Yes        No Known Allergies     Review of Systems:  Per HPI. Objective:     Patient Vitals for the past 8 hrs:   BP Temp Pulse Resp SpO2   19 1230     96 %   19 1215 194/82    94 %   19 1157 (!) 198/95    (!) 87 %   19 1146     (!) 89 %   19 0945 (!) 195/110    95 %   19 0935 (!) 213/101 98.3 °F (36.8 °C) 95 20 98 %     Temp (24hrs), Av.3 °F (36.8 °C), Min:98.3 °F (36.8 °C), Max:98.3 °F (36.8 °C)      EXAM:   NAD. Lying in ER stretcher. No family present. Answers questions appropriately. Left elbow bandaged. Moves BUE OK. Moves RLE OK. SILT foot. CR < 2 secs. LLE shortened and externally rotated. Moves ankle OK. Foot SILT. CR < 2 secs. Imaging Review:   Xray left hip 9/3/19: IMPRESSION: Left femoral neck fracture. Labs:   Pending.       Impression:     Patient Active Problem List    Diagnosis Date Noted    Fracture of unspecified part of neck of left femur, initial encounter for closed fracture ([facility] [address] 09/03/2019     Active Problems:    Fracture of unspecified part of neck of left femur, initial encounter for closed fracture ([facility] [address] (9/3/2019)        Plan:   I explained the nature of the injury and discussed the recommended surgery. Discussed potential risks/benefits of surgery and blood transfusions. Patient consents to both. Plan for left hip steve-arthroplasty. I also spoke with daughter, David Das, via tele who is not mPOA but understands nature of injury and is in agreement with plan. Consent filled out and left with patient as he wants time to read carefully before signing. Medical clearance pending. Hospitalist request cardio consult. Potential surgery today if cleared and OR available. Dr. Shama Spangler spoke with OR. Bedrest.  NPO. Jazmine. Ice. SCDs OK. Hold heparin. Dr. Marquis Tomlinson is aware and agrees with above plan.       KENTON Lin  Orthopedic Trauma Service  [address]

## 2019-09-03 NOTE — PROGRESS NOTES
Primary Nurse Edgar Chua and Zafar Melgar, DAIVD performed a dual skin assessment on this patient.   The following impairment was noted:    Skin tear L forearm and hand (from fall)  Bilateral heels red/blanchable  Sacrum red/blanchable  Bilateral buttocks red/blanchable    Mark score is 17

## 2019-09-03 NOTE — ED TRIAGE NOTES
Arrives via EMS from private [facility] outside on uneven pavement resulting in left hip pain. Landed on left side, states he struck his head, abrasion to left forearm. Denies blood thinner use. Denies LOC. Unable to ambulate since fall.

## 2019-09-03 NOTE — PROGRESS NOTES
Problem: Pressure Injury - Risk of  Goal: *Prevention of pressure injury  Description  Document Mark Scale and appropriate interventions in the flowsheet. Note:   Pressure Injury Interventions:       Moisture Interventions: Absorbent underpads, Check for incontinence Q2 hours and as needed, Minimize layers    Activity Interventions: Increase time out of bed, Pressure redistribution bed/mattress(bed type), PT/OT evaluation    Mobility Interventions: HOB 30 degrees or less, Pressure redistribution bed/mattress (bed type), PT/OT evaluation    Nutrition Interventions: Offer support with meals,snacks and hydration    Friction and Shear Interventions: HOB 30 degrees or less, Lift sheet, Minimize layers                Problem: Falls - Risk of  Goal: *Absence of Falls  Description  Document Stacey Fall Risk and appropriate interventions in the flowsheet.   Note:   Fall Risk Interventions:       Mentation Interventions: Adequate sleep, hydration, pain control, Door open when patient unattended, Bed/chair exit alarm, Eyeglasses and hearing aids, More frequent rounding, Reorient patient, Toileting rounds    Medication Interventions: Evaluate medications/consider consulting pharmacy, Patient to call before getting OOB, Teach patient to arise slowly    Elimination Interventions: Call light in reach, Bed/chair exit alarm, Patient to call for help with toileting needs, Toileting schedule/hourly rounds    History of Falls Interventions: Bed/chair exit alarm, Consult care management for discharge planning, Door open when patient unattended, Evaluate medications/consider consulting pharmacy, Investigate reason for fall, Room close to nurse's station

## 2019-09-03 NOTE — PROGRESS NOTES
TRANSFER - IN REPORT:    Verbal report received from Vince Johnson RN(name) on [facility]  being received from ED(unit) for routine progression of care      Report consisted of patients Situation, Background, Assessment and   Recommendations(SBAR). Information from the following report(s) SBAR, Kardex, ED Summary, Intake/Output, MAR and Recent Results was reviewed with the receiving nurse. Opportunity for questions and clarification was provided. Assessment completed upon patients arrival to unit and care assumed.

## 2019-09-03 NOTE — ROUTINE PROCESS
TRANSFER - OUT REPORT:    Verbal report given to Nabor Andrade RN (name) on Johanna Bright  being transferred to [facility](unit) for routine progression of care       Report consisted of patients Situation, Background, Assessment and   Recommendations(SBAR). Information from the following report(s) SBAR, ED Summary and MAR was reviewed with the receiving nurse. Lines:   Peripheral IV 09/03/19 Right Forearm (Active)   Site Assessment Clean, dry, & intact 9/3/2019 12:03 PM   Phlebitis Assessment 0 9/3/2019 12:03 PM   Infiltration Assessment 0 9/3/2019 12:03 PM   Hub Color/Line Status Pink 9/3/2019 12:03 PM   Alcohol Cap Used Yes 9/3/2019 12:03 PM        Opportunity for questions and clarification was provided.       Patient transported with:   Phase [facility]

## 2019-09-04 NOTE — H&P
[address] [facility]  HISTORY AND PHYSICAL    Name:  Vince Sewell  MR#:  [id number]  :  1927  ACCOUNT #:  [de-identified]  ADMIT DATE:  2019      CHIEF COMPLAINT:  Left hip pain. HISTORY OF PRESENT ILLNESS:  The patient is a 60-year-old male with past medical history significant for coronary artery bypass graft, valve replacement, brought in by EMS for left hip pain. The patient stated that he was walking on uneven ground this morning and he fell and was unable to standup. The patient denies having any chest pain or dizziness prior to his fall. He states the fall was due to uneven, recently paved driveway. He called his neighbor and he brought him here via EMS. The patient states he normally walks without any assisted device. He has a 3-story building and can climb all the stairs without any difficulty. He denies any chest pain or shortness of breath when climbing stairs. He is an ex-smoker, not a smoker now, taking only aspirin 325 mg. He has a history of coronary artery bypass graft and also valve replacement several years ago. He states all his surgical procedures were done here in [facility] [place].  Pain being moderate at the time of examination. Next, upon arrival, the patient's labs basically normal CBC, electrolyte slightly bump in creatinine, otherwise, none significant. CT of the head was unremarkable and x-ray of the chest, single view without any acute finding. X-ray showed left femoral neck fracture. Hospitalist consulted to admit and cleared for surgery. PAST MEDICAL HISTORY:  Coronary artery disease, degenerative disk disease, lumbar sciatica. PAST SURGICAL HISTORY:  Coronary artery bypass graft. SOCIAL HISTORY:  Noncontributory. FAMILY HISTORY:  Noncontributory. PRIOR TO ADMISSION MEDICATIONS:  No current facility-administered medications on file prior to encounter.       Current Outpatient Medications on File Prior to Encounter   Medication Sig Dispense Refill    aspirin delayed-release 325 mg tablet Take 325 mg by mouth daily. ALLERGIES:No Known Allergies      REVIEW OF SYSTEMS:Review of Systems   Constitutional: Negative for chills, fever and weight loss. Respiratory: Negative for cough and shortness of breath. Cardiovascular: Negative for chest pain and palpitations. Musculoskeletal: Positive for back pain, falls and joint pain. Negative for myalgias and neck pain. Skin: Negative for rash. Neurological: Negative for dizziness, tingling, tremors, sensory change, focal weakness, weakness and headaches. Psychiatric/Behavioral: Negative for depression, hallucinations, substance abuse and suicidal ideas. The patient is not nervous/anxious and does not have insomnia. PHYSICAL EXAMINATION:  Patient Vitals for the past 12 hrs:   Temp Pulse Resp BP SpO2   09/03/19 2056 98.6 °F (37 °C) 91 18 144/76 95 %   09/03/19 1618 (!) 100.7 °F (38.2 °C) 98 20 130/67 90 %   09/03/19 1438 98.5 °F (36.9 °C) (!) 107 18 137/70 93 %   09/03/19 1415    140/58 93 %   09/03/19 1400    153/61 94 %   09/03/19 1345    148/65 91 %   09/03/19 1330    136/57 93 %   09/03/19 1230     96 %   09/03/19 1215    194/82 94 %   09/03/19 1157    (!) 198/95 (!) 87 %   09/03/19 1146     (!) 89 %   Physical Exam   Constitutional: He is oriented to person, place, and time. No distress. HENT:   Head: Normocephalic and atraumatic. Eyes: Right eye exhibits no discharge. Left eye exhibits no discharge. No scleral icterus. Neck: No tracheal deviation present. No thyromegaly present. Cardiovascular: Normal rate, regular rhythm, normal heart sounds and intact distal pulses. Exam reveals no gallop. No murmur heard. Pulmonary/Chest: No respiratory distress. He has no wheezes. He has no rales. He exhibits no tenderness. Abdominal: He exhibits no distension and no mass. There is no tenderness. There is no rebound and no guarding. Musculoskeletal: He exhibits tenderness. He exhibits no edema. Left hip: He exhibits decreased range of motion, decreased strength, tenderness, bony tenderness and swelling. He exhibits no laceration. Lymphadenopathy:     He has no cervical adenopathy. Neurological: He is alert and oriented to person, place, and time. He displays normal reflexes. No cranial nerve deficit. He exhibits normal muscle tone. Coordination normal. GCS score is 15. Skin: No rash noted. He is not diaphoretic. No erythema. No pallor. Psychiatric: Affect and judgment normal.       LABORATORY DATA:  Recent Results (from the past 12 hour(s))   PROTHROMBIN TIME + INR    Collection Time: 09/03/19  1:21 PM   Result Value Ref Range    INR 1.2 (H) 0.9 - 1.1      Prothrombin time 12.2 (H) 9.0 - 11.1 sec   URINALYSIS W/ REFLEX CULTURE    Collection Time: 09/03/19  1:21 PM   Result Value Ref Range    Color YELLOW/STRAW      Appearance CLEAR CLEAR      Specific gravity 1.011 1.003 - 1.030      pH (UA) 7.5 5.0 - 8.0      Protein 30 (A) NEG mg/dL    Glucose NEGATIVE  NEG mg/dL    Ketone NEGATIVE  NEG mg/dL    Bilirubin NEGATIVE  NEG      Blood NEGATIVE  NEG      Urobilinogen 1.0 0.2 - 1.0 EU/dL    Nitrites NEGATIVE  NEG      Leukocyte Esterase NEGATIVE  NEG      WBC 0-4 0 - 4 /hpf    RBC 0-5 0 - 5 /hpf    Epithelial cells FEW FEW /lpf    Bacteria NEGATIVE  NEG /hpf    UA:UC IF INDICATED CULTURE NOT INDICATED BY UA RESULT CNI     TYPE & SCREEN    Collection Time: 09/03/19  1:21 PM   Result Value Ref Range    Crossmatch Expiration 09/06/2019     ABO/Rh(D) O NEGATIVE     Antibody screen NEG    GLUCOSE, POC    Collection Time: 09/03/19  3:20 PM   Result Value Ref Range    Glucose (POC) 94 65 - 100 mg/dL    Performed by Jones VILCHIS      Imaging: normal CXR, normal CT of head, Femoral neck FX left         ASSESSMENT AND PLAN:  The patient is a 25-year-old  with Left femoral neck fracture.   Hospitalist consulted for admission and surgical clearance. 1.  The patient has medical history significant for coronary artery disease,  valvular replacement, denies chest pain, . The patient does not have any acute cardiac issues that needs intervention. He had an EKG, which showed first-degree AV block and normal chest x-ray. His blood pressure at the time of the examination is elevated,now normal .  I consulted Cardiology for surgical masoud. No need for any acute intervention at this time. Discussed with Dr. Luli Ramos   2. History of coronary artery disease, status post coronary artery bypass graft. The patient is taking aspirin 325 mg.  3.  Deep venous thrombosis prophylaxis. No heparin prior to his surgery. CODE STATUS:  Full code. DISPOSITION:  TBD.       Halima Jefferson MD      [id number]  D:  09/03/2019 16:30  T:  09/03/2019 18:09  JOB #:  [id number]

## 2019-09-04 NOTE — ANESTHESIA POSTPROCEDURE EVALUATION
Post-Anesthesia Evaluation and Assessment    Patient: Maricarmen Bhatia MRN: [id number]  SSN: [id number]    YOB: 1927  Age: 80 y.o. Sex: male      I have evaluated the patient and they are stable and ready for discharge from the PACU. Cardiovascular Function/Vital Signs  Visit Vitals  /45   Pulse 70   Temp 36.6 °C (97.8 °F)   Resp 18   SpO2 99%       Patient is status post General anesthesia for Procedure(s):  LEFT HIP HEMIARTHROPLASTY. Nausea/Vomiting: None    Postoperative hydration reviewed and adequate. Pain:  Pain Scale 1: Adult Nonverbal Pain Scale (09/04/19 1830)  Pain Intensity 1: 0 (09/04/19 1830)   Managed    Neurological Status:   Neuro (WDL): Exceptions to [facility] (09/04/19 1830)  Neuro  Neurologic State: Drowsy (09/04/19 1841)  Orientation Level: Disoriented to place; Disoriented to time;Oriented to person (09/04/19 1841)  Cognition: Decreased attention/concentration;Decreased command following (09/04/19 1841)  LUE Motor Response: Purposeful (09/04/19 1830)  LLE Motor Response: Purposeful (09/04/19 1830)  RUE Motor Response: Purposeful (09/04/19 1830)  RLE Motor Response: Purposeful (09/04/19 1830)   At baseline    Mental Status, Level of Consciousness: Alert and  oriented to person, place, and time    Pulmonary Status:   O2 Device: Nasal cannula (09/04/19 1830)   Adequate oxygenation and airway patent    Complications related to anesthesia: None    Post-anesthesia assessment completed. No concerns    Signed By: Aniceto Moses MD     September 4, 2019              Procedure(s):  LEFT HIP HEMIARTHROPLASTY. general    <BSHSIANPOST>    Vitals Value Taken Time   /45 9/4/2019  6:30 PM   Temp 36.6 °C (97.8 °F) 9/4/2019  6:30 PM   Pulse 68 9/4/2019  6:43 PM   Resp 18 9/4/2019  6:43 PM   SpO2 99 % 9/4/2019  6:43 PM   Vitals shown include unvalidated device data.

## 2019-09-04 NOTE — PROGRESS NOTES
Spiritual Care Assessment/Progress Note  [facility]      NAME: Ericka Delgado      MRN: [id number]  AGE: 80 y.o. SEX: male  Congregation Affiliation: No Nondenominational   Language: English     9/4/2019     Total Time (in minutes): 8     Spiritual Assessment begun in [facility] PREHOLD/AM ADMIT through conversation with:         []Patient        [] Family    [] Friend(s)        Reason for Consult: Initial/Spiritual assessment, patient floor     Spiritual beliefs: (Please include comment if needed)     [] Identifies with a aubree tradition:         [] Supported by a aubree community:            [] Claims no spiritual orientation:           [] Seeking spiritual identity:                [] Adheres to an individual form of spirituality:           [x] Not able to assess:                           Identified resources for coping:      [] Prayer                               [] Music                  [] Guided Imagery     [] Family/friends                 [] Pet visits     [] Devotional reading                         [x] Unknown     [] Other:                                              Interventions offered during this visit: (See comments for more details)    Patient Interventions: Initial/Spiritual assessment, patient floor, Initial visit           Plan of Care:     [x] Support spiritual and/or cultural needs    [] Support AMD and/or advance care planning process      [] Support grieving process   [] Coordinate Rites and/or Rituals    [] Coordination with community clergy   [] No spiritual needs identified at this time   [] Detailed Plan of Care below (See Comments)  [] Make referral to Music Therapy  [] Make referral to Pet Therapy     [] Make referral to Addiction services  [] Make referral to [facility]  [] Make referral to Spiritual Care Partner  [] No future visits requested        [x] Follow up visits as needed     Comments:  made initial visit to patients room in Ortho for initial spiritual assessment.  Patient was out of the room for a procedure.  was unable to access patients needs at this time. Spiritual Care will follow up as needed.     Swetha Law MDiv  Pager: [phone]

## 2019-09-04 NOTE — BRIEF OP NOTE
BRIEF OPERATIVE NOTE    Date of Procedure: 9/4/2019   Preoperative Diagnosis: LEFT FEMORAL NECK FRACTURE  Postoperative Diagnosis: LEFT FEMORAL NECK FRACTURE    Procedure(s):  LEFT HIP HEMIARTHROPLASTY  Surgeon(s) and Role:     Guanako Marrufo MD - Primary         Surgical Assistant: Sangeeta Russo PA-C     Surgical Staff:  Circ-1: Ernie Corbina: Karin Ferrera RN  Physician Assistant: Tate Pascual PA-C  Scrub RN-1: Mariia Gregory RN  Surg Asst-1: Karina Gamez RN  Surg Asst-Relief: Cristi BRANTLEY  Event Time In Time Out   Incision Start 09/04/2019 1627    Incision Close       Anesthesia: General   Estimated Blood Loss: 200cc  Specimens: * No specimens in log *   Findings: left femoral neck fracture   Complications: none  Implants:   Implant Name Type Inv.  Item Serial No.  Lot No. LRB No. Used Action   FEMORAL STEM   N/A [employer] [id number] Left 1 Implanted   BIPOLAR HEAD   N/A [employer] [id number] Left 1 Implanted   HEAD FEM 28MM +5MM NK --  - SN/A  HEAD FEM 28MM +5MM NK --  N/A [facility] [employer] [id number] Left 1 Implanted

## 2019-09-04 NOTE — CONSULTS
Date of Consultation:  September 3, 2019  Referring Physician:  Joana Sales  CC: left hip pain     HPI:  Dakota Patel is a 80 y.o. male PMH CABG and valve replacement who c/o left hip pain s/p GLF in his neighbor's driveway yesterday. He is able to recall details of the fall (tripped on elevated edge of asphalt). He reports elbow wound but denies hip wound. Also denies foot numbness. At baseline, lives independently in [address] [place] [address] and ambulates with device. His daughters reside in [place] and [place].  daily.            Past Medical History:   Diagnosis Date    CAD (coronary artery disease)      DDD (degenerative disc disease), lumbar      Sciatica              Past Surgical History:   Procedure Laterality Date    HX CORONARY ARTERY BYPASS GRAFT          History reviewed. No pertinent family history. Social History           Tobacco Use    Smoking status: Never Smoker    Smokeless tobacco: Never Used   Substance Use Topics    Alcohol use: Yes         No Known Allergies      Review of Systems:  Per HPI.     Objective:      Patient Vitals for the past 8 hrs:    BP Temp Pulse Resp SpO2   19 1230     96 %   19 1215 194/82    94 %   19 1157 (!) 198/95    (!) 87 %   19 1146     (!) 89 %   19 0945 (!) 195/110    95 %   19 0935 (!) 213/101 98.3 °F (36.8 °C) 95 20 98 %      Temp (24hrs), Av.3 °F (36.8 °C), Min:98.3 °F (36.8 °C), Max:98.3 °F (36.8 °C)        EXAM:   NAD. Lying in ER stretcher. No family present. Answers questions appropriately. Left elbow bandaged. Moves BUE OK. Moves RLE OK. SILT foot. CR < 2 secs. LLE shortened and externally rotated. Moves ankle OK. Foot SILT. CR < 2 secs.        Imaging Review:   Xray left hip 9/3/19:   IMPRESSION: Left femoral neck fracture.        Labs:   Pending.        Impression:           Patient Active Problem List     Diagnosis Date Noted    Fracture of unspecified part of neck of left femur, initial encounter for closed fracture ([facility] 09/03/2019      Active Problems:    Fracture of unspecified part of neck of left femur, initial encounter for closed fracture ([facility] [address] [facility] (9/3/2019)           Plan:   I explained the nature of the injury and discussed the recommended surgery (hip hemiarthroplasty). Discussed potential risks/benefits of surgery and blood transfusions. Patient consents to both. Plan for left hip steve-arthroplasty.       Plan surgery today. Bedrest.  NPO. Lucero. Ice. SCDs OK. Hold heparin.     Tomy Lincoln MD

## 2019-09-04 NOTE — PROGRESS NOTES
09/04/19 1637   Family Communication   Family Update Message Procedure started   Delivery Origin Nurse  Rickey Matos)    Relationship to Patient Daughter  (Lyndsay Tsang")    Phone Number [phone]   Family/Significant Other Update Called

## 2019-09-04 NOTE — PERIOP NOTES
TRANSFER - OUT REPORT:    Verbal report given to Venkata Carmen RN(name) on Kylee Faustin  being transferred to (unit) for routine post - op       Report consisted of patients Situation, Background, Assessment and   Recommendations(SBAR). Time Pre op antibiotic given:1445  Anesthesia Stop time: 3081  Lucero Present on Transfer to floor:yes  Order for Lucero on Chart:yes  Discharge Prescriptions with Chart:n/a    Information from the following report(s) SBAR, Kardex, OR Summary, Procedure Summary, Intake/Output, MAR, Recent Results and Cardiac Rhythm SR was reviewed with the receiving nurse. Opportunity for questions and clarification was provided. Is the patient on 02? YES       L/Min 2    Is the patient on a monitor? NO    Is the nurse transporting with the patient? NO    Surgical Waiting Area notified of patient's transfer from PACU? YES      The following personal items collected during your admission accompanied patient upon transfer:   Dental Appliance: Dental Appliances: None  Vision: Visual Aid: Glasses, With patient  Hearing Aid:    Jewelry: Jewelry: None  Clothing: Clothing: None  Other Valuables:  Other Valuables: None  Valuables sent to safe:

## 2019-09-04 NOTE — ROUTINE PROCESS
TRANSFER - IN REPORT:    Verbal report received from [facility] RN(name) on [facility]  being received from [address] [place] [address](unit) for ordered procedure      Report consisted of patients Situation, Background, Assessment and   Recommendations(SBAR). Information from the following report(s) SBAR, Kardex, ED Summary, Procedure Summary, Intake/Output, MAR and Recent Results was reviewed with the receiving nurse. Opportunity for questions and clarification was provided. Assessment completed upon patients arrival to unit and care assumed.

## 2019-09-04 NOTE — WOUND CARE
WOCN Note:     New consult for skin tears. Chart shows:  Admitted for ground level fall with left hip fracture with plans for surgery  History of CEDRIC  Lives home alone. Visitor in room. Assessment:   Communicative but hazy conversation, relaxed and interactive. He assists in repositioning from supine to right. Wearing briefs. Bed: [facility]  He reports no pain. Bilateral heel skin intact and without erythema. Left is rotated outward and not resting on the bed. He lifts the right independently. 1. POA, multiple left elbow skin tears in a 12 x 12 cm field. Mostly small & insignificant with partial skin flaps and tiny red moist crescents. There is one larger one = 4.5 x 8 x 0.1 cm  100% moist red with small amount of serosanguinous exudate. Cleaned well with saline, applied Mepitel One and petroleum ointment with dry cover dressing. Left lateral knuckle with drying skin tear left open to air. Recommendations:    Skin tears: leave contact layer of Mepitel One in place for several days (or change as needed), apply petroleum ointment through the holes in the contact layer, & apply dry cover. Avoid tape on skin. Change dry cover dressing as needed with oozing and apply petroleum every three days or with dressing changes. Minimize layers of linen/pads under patient to optimize support surface. Turn/reposition approximately every 2 hours and offload heels.     Transition of Care: Plan to follow weekly and as needed while admitted to hospital.     GIRISH SchillingN, RN, [address]  Certified Wound, Ostomy, Continence Nurse  office [phone]  pager [phone] or call  to page

## 2019-09-04 NOTE — PROGRESS NOTES
Bedside and Verbal shift change report given to Dahiana Torres RN (oncoming nurse) by Krishna Sánchez RN (offgoing nurse). Report included the following information SBAR, Kardex, Intake/Output, MAR and Recent Results.

## 2019-09-04 NOTE — ANESTHESIA PREPROCEDURE EVALUATION
Anesthetic History   No history of anesthetic complications            Review of Systems / Medical History  Patient summary reviewed, nursing notes reviewed and pertinent labs reviewed    Pulmonary  Within defined limits                 Neuro/Psych   Within defined limits           Cardiovascular            Dysrhythmias   CAD and CABG    Exercise tolerance: >4 METS     GI/Hepatic/Renal               Comments: H/o esophageal spasm Endo/Other  Within defined limits           Other Findings              Physical Exam    Airway  Mallampati: II  TM Distance: 4 - 6 cm  Neck ROM: normal range of motion   Mouth opening: Normal     Cardiovascular    Rhythm: irregular  Rate: normal         Dental    Dentition: Full upper dentures and Full lower dentures     Pulmonary  Breath sounds clear to auscultation               Abdominal  GI exam deferred       Other Findings            Anesthetic Plan    ASA: 3  Anesthesia type: general          Induction: Intravenous  Anesthetic plan and risks discussed with: Patient

## 2019-09-04 NOTE — PROGRESS NOTES
JYOTI-Pending therapy recs after surgery. Reason for Admission:   Left femur fracture                   RRAT Score: 5                    Plan for utilizing home health:    TBD                      Current Advanced Directive/Advance Care Plan: Not on file. Transition of Care Plan:    CM met with pt and his neighbor, Mariola Larson, to introduce them to CM role. This pt lives at home alone and he was independent prior to admission. Pt said that he was very active and still driving. Pt's neighbor confirmed this. CM discussed possible rehab with pt who is in agreement if he needs it. Pt for surgery today. Per pt, his daughters both live in [place]. CM will follow for d/c needs. [address] Management Interventions  PCP Verified by CM:  Yes  Palliative Care Criteria Met (RRAT>21 & CHF Dx)?: No  Transition of Care Consult (CM Consult): Discharge Planning  MyChart Signup: No  Discharge Durable Medical Equipment: No  Physical Therapy Consult: No  Occupational Therapy Consult: No  Speech Therapy Consult: No  Current Support Network: Lives Alone  Confirm Follow Up Transport: Family  Plan discussed with Pt/Family/Caregiver: Yes  Freedom of Choice Offered: Yes  [place] Resource Information Provided?: No

## 2019-09-04 NOTE — PROGRESS NOTES
Bedside and Verbal shift change report given to Daisy Coto RN (oncoming nurse) by David Kang RN (offgoing nurse). Report included the following information SBAR, Kardex, ED Summary, Intake/Output, MAR and Recent Results.

## 2019-09-04 NOTE — CONSULTS
[facility]    Name:  Kevin Bertrand  MR#:  [id number]  :  1927  ACCOUNT #:  [de-identified]  DATE OF SERVICE:  2019    REFERRING PHYSICIAN:  Pavel Alvarado MD    HISTORY OF PRESENT ILLNESS:  This is a cardiology consultation requesting cardiology clearance prior to proceeding surgery to fix his fractured left hip. The patient fell earlier today outside his home. He was brought to the hospital, found to have a fractured cervical neck fracture. He has a history of cardiac issues, including bowel surgery and bypass surgery, both in , last seen by a doctor in our practice on 2018. The patient is awake and alert this evening and though he is somewhat forgetful, but he is otherwise quite clear. He walks everyday. Has a riding mower for his yard and stays active. He denies any chest pain, dyspnea, orthopnea, syncope, diaphoresis, palpitations, or other significant cardiovascular-related complaints. In , the patient had a #21 [employer] porcine valve prosthesis and bypass grafting at the same time. Other medical problems, hypertension, hyperlipemia, status post carotid stenting and lower extremity edema. ALLERGIES TO MEDICATIONS:  NONE. REVIEW OF SYSTEMS:  Otherwise unremarkable. PHYSICAL EXAMINATION:  GENERAL:  This is a well-developed, alert, elderly gentleman, in no distress, lying flat on his hospital bed. VITAL SIGNS:  Heart rate is 91, blood pressure 144/76, respirations 18, and temperature 98. 6. HEENT:  Unremarkable. NECK:  Supple. No adenopathy. Normal carotid pulsations. No bruits on auscultation. Normal thyroid. Trachea midline. CHEST WALL:  Healed sternotomy. LUNGS:  Clear to auscultation and percussion. HEART:  Regular moderate rhythm. No friction rub. No thrills, lifts, or heaves. ABDOMEN:  Soft and nontender. No bruits. No ascites. No palpable masses. EXTREMITIES:  No significant edema.   Pedal pulses palpable. NEUROLOGIC:  The patient is awake, alert, and appropriate. Normal speech. DIAGNOSTIC DATA:  His EKG demonstrates sinus rhythm, non diagnostic ST-T changes. When compared to the most recent previous EKG from our practice dated 06/25/2018, there has been no significant interval change. His chest x-ray today was clear. His lab work is unremarkable as well. IMPRESSION:  The patient has femoral neck fracture and needs surgery. He is in a great shape given his age. No signs or symptoms of cardiac decompensation. Would consider him an intermediate risk for cardiac complications and recommending proceeding with surgery as planned.     Thank you for this referral.    Chance Cannon MD SA/[email or web address]/[email or web address]  D:  09/03/2019 22:33  T:  09/03/2019 23:53  JOB #:  [id number]

## 2019-09-04 NOTE — CONSULTS
Cardiology Note dictated # [id number]  Impression:  Stable from a cardiovascular POV  Intermediate risk for cardiac complications  Proceed with surgery  Thanks  Sorin Garcia MD

## 2019-09-04 NOTE — PROGRESS NOTES
Bedside and Verbal shift change report given to [address] [place] [address] Clarke (oncoming nurse) by Rolando Hess (offgoing nurse). Report included the following information SBAR, Kardex, Intake/Output, MAR and Recent Results.

## 2019-09-04 NOTE — PROGRESS NOTES
TRANSFER - IN REPORT:    Verbal report received from Lizabeth(name) on Sara Mariee  being received from PACU(unit) for routine post - op      Report consisted of patients Situation, Background, Assessment and   Recommendations(SBAR). Information from the following report(s) SBAR, Kardex, Intake/Output, MAR and Recent Results was reviewed with the receiving nurse. Opportunity for questions and clarification was provided. Assessment completed upon patients arrival to unit and care assumed.

## 2019-09-04 NOTE — PROGRESS NOTES
Hospitalist Progress Note    NAME: Keo Pratt   :  3/18/1927   MRN:  [id number]       Assessment / Plan:    ASSESSMENT AND PLAN:  The patient is a 80-year-old male with Left femoral neck fracture. Hospitalist consulted for admission and surgical clearance. 1.  The patient has medical history significant for coronary artery disease with valvular replacement   The patient does not have any acute cardiac issues that needs intervention. He had an EKG, which showed first-degree AV block and normal chest x-ray. His blood pressure at the time of the examination is elevated, now controlled. consulted Cardiology for  evaluation and optimize his cardiovascular reserve prior to procedure   2. History of coronary artery disease, status post coronary artery bypass graft. The patient is taking aspirin 325 mg.  3.  Deep venous thrombosis prophylaxis. No heparin prior to his surgery.     CODE STATUS:  Full code.     DISPOSITION:  TBD. There is no height or weight on file to calculate BMI. Code status: Full  Prophylaxis: Lovenox  Recommended Disposition: SAH/Rehab     Subjective:     Chief Complaint / Reason for Physician Visit  \"feeling ok today, discussed with friend  and daughter over phone \". Discussed with RN events overnight. Review of Systems:  Symptom Y/N Comments  Symptom Y/N Comments   Fever/Chills    Chest Pain     Poor Appetite    Edema     Cough    Abdominal Pain     Sputum    Joint Pain     SOB/JOSEPH    Pruritis/Rash     Nausea/vomit    Tolerating PT/OT     Diarrhea    Tolerating Diet     Constipation    Other       Could NOT obtain due to:      Objective:     VITALS:   Last 24hrs VS reviewed since prior progress note.  Most recent are:  Patient Vitals for the past 24 hrs:   Temp Pulse Resp BP SpO2   19 1331 (P) 98.9 °F (37.2 °C) (P) 97 (P) 18  (P) 98 %   19 0903 98.7 °F (37.1 °C) 74 18 146/68 98 %   19 0821 98.1 °F (36.7 °C) 75 18 142/62 91 %   19 0318 98.9 °F (37.2 °C) 77 17 162/72 93 %   09/03/19 2056 98.6 °F (37 °C) 91 18 144/76 95 %   09/03/19 1618 (!) 100.7 °F (38.2 °C) 98 20 130/67 90 %   09/03/19 1438 98.5 °F (36.9 °C) (!) 107 18 137/70 93 %   09/03/19 1415    140/58 93 %       Intake/Output Summary (Last 24 hours) at 9/4/2019 1410  Last data filed at 9/4/2019 0323  Gross per 24 hour   Intake    Output 325 ml   Net -325 ml        PHYSICAL EXAM:  General: WD, WN. Alert, cooperative, no acute distress    EENT:  EOMI. Anicteric sclerae. MMM  Resp:  CTA bilaterally, no wheezing or rales. No accessory muscle use  CV:  Regular  rhythm,  No edema  GI:  Soft, Non distended, Non tender.  +Bowel sounds  Neurologic:  Alert and oriented X 3, normal speech,   Psych:   Good insight. Not anxious nor agitated  Skin:  No rashes. No jaundice    Reviewed most current lab test results and cultures  YES  Reviewed most current radiology test results   YES  Review and summation of old records today    NO  Reviewed patient's current orders and MAR    YES  PMH/SH reviewed - no change compared to H&P  ________________________________________________________________________  Care Plan discussed with:    Comments   Patient     Family      RN     Care Manager     Consultant                        Multidiciplinary team rounds were held today with , nursing, pharmacist and clinical coordinator. Patient's plan of care was discussed; medications were reviewed and discharge planning was addressed.      ________________________________________________________________________  Total NON critical care TIME:  30 Minutes    Total CRITICAL CARE TIME Spent:   Minutes non procedure based      Comments   >50% of visit spent in counseling and coordination of care     ________________________________________________________________________  Nenita Castellanos MD     Procedures: see electronic medical records for all procedures/Xrays and details which were not copied into this note but were reviewed prior to creation of Plan. LABS:  I reviewed today's most current labs and imaging studies.   Pertinent labs include:  Recent Labs     09/04/19  0330 09/03/19  1013   WBC 12.2* 7.6   HGB 11.5* 12.7   HCT 35.8* 39.9   * 166     Recent Labs     09/04/19  0330 09/03/19  1321 09/03/19  1013     --  142   K 4.2  --  4.1     --  107   CO2 26  --  30   *  --  98   BUN 31*  --  32*   CREA 1.43*  --  1.38*   CA 8.7  --  9.2   ALB  --   --  3.5   TBILI  --   --  1.1*   SGOT  --   --  24   ALT  --   --  23   INR  --  1.2*  --        Signed: Cortes Hancock MD

## 2019-09-04 NOTE — PERIOP NOTES
Patient: Avila Wise MRN: [id number]  SSN: [id number]   YOB: 1927  Age: 80 y.o. Sex: male     Patient is status post Procedure(s):  LEFT HIP HEMIARTHROPLASTY. Surgeon(s) and Role:     * Serina Heimlich, MD - Primary    Local/Dose/Irrigation:  100 mL surgical pain solution, 20 mL TXA/20 mL NS                  Peripheral IV 09/04/19 (Active)            Airway - Endotracheal Tube 09/04/19 Oral (Active)                   Dressing/Packing:  Wound Elbow Left;Dorsal-Dressing Type: (see wound note) (09/04/19 1122)  Wound Hip Left-Dressing Type: Aquacel; Topical skin adhesive/glue (09/04/19 1700)    Splint/Cast:  ]    Other:  Lucero

## 2019-09-05 NOTE — PROGRESS NOTES
NUTRITION COMPLETE ASSESSMENT    RECOMMENDATIONS:   1. Regular diet; staff assist with menu selections  2. Assure wearing dentures for meals or serve soft texture foods  3. RD add Ensure Enlive BID     Interventions/Plan:   Food/Nutrient Delivery:  General/healthful diet Commercial supplement(Ensure Enlive BID)        Nutrition Education:     Coordination of Care:    Nutrition Counseling:        Assessment:   Reason for Assessment:   [x] Provider Consult: Geriatric hip fx    Diet: Regular  Supplements: RD add Ensure Enlive BID (likes strawberry, but also try other flavors)  Nutritionally Significant Medications: [x] Reviewed & Includes: Oscal D, Miralax, Pericolace, IV 0.9% NaCl @75 mL.hr   Meal Intake: No data found. Subjective:  #, know I lost wt. Been busy with other things and not eating like I should. Wear dentures and have to wear them to eat. No BM since I got here. I like prune juice. Objective:  Past Medical History:   Diagnosis Date    CAD (coronary artery disease)     DDD (degenerative disc disease), lumbar     Sciatica    PMH includes: CABG with valve replacement. Admit L-hip fx following a GLF on driveway. S/P L-hemiarthroplasty (9/4/19). Started on clear liquid diet and now regular. Pt believes he ate dinner, but had not. RD called for a dinner tray and provided Ensure Enlive. Claims never had ONS, but accepted well and willing to drink BID = 700 kcal and 40 gm pro meets 35% kcal and 53% pro needs respectively. # per pt recall. No recent prior encounters to verify trend. Also claims he knows he lost wt because he hasn't been eating, he's been too busy with important things to eat. BMI 20 at lower limit acceptable wt range. Based on per report #, would appear ~20# wt loss, but time frame and actual wt trend unknown. Clavicle wasting and thin appearance. C/O constipation, Rx Miralax and Pericolace. Encouraged fluids and would like to try prune juice. Can also offer high fiber foods. No hx DM; POC FBG 91 (9/4/19)/ HGB 10.3 post-op. Estimated Nutrition Needs:   Kcals/day: 2000 Kcals/day  Protein:  75  Fluid: 2000 ml(~1mL/kcal)     Based On: Kirill Castillo(MSJ x 1.3 = kcal wt gain/repletion)  Weight Used: Actual wt(63.2 kg)    Pt expected to meet estimated nutrient needs:  [x]   Yes with ONS and reminders, assist to select menu and eat  Comparative Standards:  ;  ;      Nutrition Diagnosis:   1. Unintended weight loss related to poor appetite/intake PTA as evidenced by Pt reports #, admit 139# and claims he knows he lost wt; claims, \"Been too busy to eat lately\"    2. Increased nutrient needs related to geriatric hip fx, ortho surgery with poor po as evidenced by Age 80, poor appetite, recent unplanned wt loss; L-hemiarthoplasty surgery with increased protein needs healing    Goals:     Consume minimum 100% ONS BID + at least 50% all meals. Monitoring & Evaluation:    - Total energy intake, Oral fluids amount, Liquid meal replacement, Protein intake   - Weight/weight change, Lean body mass, fat free mass, Protein profile, GI     Previous Nutrition Goals Met:  N/A  Previous Recommendations:      N/A    Education & Discharge Needs:   [x] None Identified   [x] Participated in care plan, discharge planning, and/or interdisciplinary rounds        Cultural, Shinto and ethnic food preferences identified:   None    Skin Integrity: [x]Intact L-hip incision  Edema: [x]None   Last BM: 9/3/19  Food Allergies: [x]NKA    Anthropometrics:    Weight Loss Metrics 9/4/2019 7/2/2015   Today's Wt 139 lb 5.3 oz 140 lb 3.2 oz   BMI 19.99 kg/m2 22.64 kg/m2      Last 3 Recorded Weights in this Encounter    09/04/19 2245   Weight: 63.2 kg (139 lb 5.3 oz)         Height: 5' 10\" (177.8 cm),    Body mass index is 19.99 kg/m².      IBW : 75.3 kg (166 lb),    Usual Body Weight: 72.6 kg (160 lb)(Pt can't recall when; no recent encounters),      Labs:    Lab Results Component Value Date/Time    Sodium 144 09/05/2019 02:35 AM    Potassium 4.5 09/05/2019 02:35 AM    Chloride 111 (H) 09/05/2019 02:35 AM    CO2 24 09/05/2019 02:35 AM    Glucose 135 (H) 09/05/2019 02:35 AM    BUN 38 (H) 09/05/2019 02:35 AM    Creatinine 1.43 (H) 09/05/2019 02:35 AM    Calcium 8.4 (L) 09/05/2019 02:35 AM    Albumin 3.5 09/03/2019 10:13 AM     No results found for: HBA1C, HGBE8, JVP6DISM, IUP2LENP  Lab Results   Component Value Date/Time    Glucose 135 (H) 09/05/2019 02:35 AM    Glucose (POC) 91 09/04/2019 02:27 PM      Lab Results   Component Value Date/Time    ALT (SGPT) 23 09/03/2019 10:13 AM    AST (SGOT) 24 09/03/2019 10:13 AM    Alk.  phosphatase 80 09/03/2019 10:13 AM    Bilirubin, total 1.1 (H) 09/03/2019 10:13 AM        Tanisha Cross RD

## 2019-09-05 NOTE — PROGRESS NOTES
Hospitalist Progress Note    NAME: Orlando Galdamez   :  3/18/1927   MRN:  [id number]       Assessment / Plan:    ASSESSMENT AND PLAN:    The patient is a 51-year-old male with Left femoral neck fracture. Hospitalist consulted for admission and surgical clearance. 1.  The patient has medical history significant for coronary artery disease with valvular replacement   The patient does not have any acute cardiac issues that needs intervention. He had an EKG, which showed first-degree AV block and normal chest x-ray. His blood pressure at the time of the examination is elevated, now controlled. consulted Cardiology for  evaluation and optimize his cardiovascular reserve prior to procedure. S/P PROCEDURE PERFORMED:  Left hip hemiarthroplasty. 9.4.19       2. History of coronary artery disease, status post coronary artery bypass graft. The patient is taking aspirin 325 mg.  3.  Deep venous thrombosis prophylaxis. Lovenox      CODE STATUS:  Full code.     DISPOSITION:  TBD. Body mass index is 19.99 kg/m². Code status: Full  Prophylaxis: Lovenox  Recommended Disposition: [facility]/Rehab     Subjective:     Chief Complaint / Reason for Physician Visit  \" denies pain or discomfort today  \". Discussed with RN events overnight. Review of Systems:  Symptom Y/N Comments  Symptom Y/N Comments   Fever/Chills    Chest Pain     Poor Appetite    Edema     Cough    Abdominal Pain     Sputum    Joint Pain     SOB/JOSEPH    Pruritis/Rash     Nausea/vomit    Tolerating PT/OT     Diarrhea    Tolerating Diet     Constipation    Other       Could NOT obtain due to:      Objective:     VITALS:   Last 24hrs VS reviewed since prior progress note.  Most recent are:  Patient Vitals for the past 24 hrs:   Temp Pulse Resp BP SpO2   19 0851 99.5 °F (37.5 °C) 93 17 158/74 97 %   19 0237 97.6 °F (36.4 °C) 68 16 143/70 100 %   19 2335 97.7 °F (36.5 °C) 62 16 121/53 99 %   19 2238 96.8 °F (36 °C) 64 16 117/59 93 % 09/04/19 2130 97.6 °F (36.4 °C) 66 16 124/54 98 %   09/04/19 2037 97 °F (36.1 °C) 69 16 117/56 98 %   09/04/19 1905 98 °F (36.7 °C) 67 16 114/53 99 %   09/04/19 1850  65 17  98 %   09/04/19 1845  68 19 120/42 98 %   09/04/19 1840  70 18  99 %   09/04/19 1835  69 18  99 %   09/04/19 1830 97.8 °F (36.6 °C) 70 16 125/45 99 %   09/04/19 1825  70 18  99 %   09/04/19 1823  70 17 137/50 100 %   09/04/19 1820  74 19 129/49 98 %   09/04/19 1815  77 18 138/52 98 %   09/04/19 1811 98.2 °F (36.8 °C) 80 18 155/58 97 %   09/04/19 1810  80 18 147/51 97 %   09/04/19 1806    155/58    09/04/19 1408 99 °F (37.2 °C) 99 22 144/75 (!) 88 %   09/04/19 1331 98.9 °F (37.2 °C) 97 18  98 %       Intake/Output Summary (Last 24 hours) at 9/5/2019 1214  Last data filed at 9/5/2019 0645  Gross per 24 hour   Intake 500 ml   Output 1150 ml   Net -650 ml        PHYSICAL EXAM:  General: WD, WN. Alert, cooperative, no acute distress    EENT:  EOMI. Anicteric sclerae. MMM  Resp:  CTA bilaterally, no wheezing or rales. No accessory muscle use  CV:  Regular  rhythm,  No edema  GI:  Soft, Non distended, Non tender.  +Bowel sounds  Neurologic:  Alert and oriented X 3, normal speech,   Psych:   Good insight. Not anxious nor agitated  Skin:  No rashes. No jaundice    Reviewed most current lab test results and cultures  YES  Reviewed most current radiology test results   YES  Review and summation of old records today    NO  Reviewed patient's current orders and MAR    YES  PMH/SH reviewed - no change compared to H&P  ________________________________________________________________________  Care Plan discussed with:    Comments   Patient     Family      RN     Care Manager     Consultant                        Multidiciplinary team rounds were held today with , nursing, pharmacist and clinical coordinator. Patient's plan of care was discussed; medications were reviewed and discharge planning was addressed. ________________________________________________________________________  Total NON critical care TIME:  30 Minutes    Total CRITICAL CARE TIME Spent:   Minutes non procedure based      Comments   >50% of visit spent in counseling and coordination of care     ________________________________________________________________________  Igor Martinez MD     Procedures: see electronic medical records for all procedures/Xrays and details which were not copied into this note but were reviewed prior to creation of Plan. LABS:  I reviewed today's most current labs and imaging studies.   Pertinent labs include:  Recent Labs     09/05/19 0235 09/04/19 0330 09/03/19  1013   WBC  --  12.2* 7.6   HGB 10.3* 11.5* 12.7   HCT  --  35.8* 39.9   PLT  --  131* 166     Recent Labs     09/05/19 0235 09/04/19 0330 09/03/19  1321 09/03/19  1013    140  --  142   K 4.5 4.2  --  4.1   * 108  --  107   CO2 24 26  --  30   * 106*  --  98   BUN 38* 31*  --  32*   CREA 1.43* 1.43*  --  1.38*   CA 8.4* 8.7  --  9.2   ALB  --   --   --  3.5   TBILI  --   --   --  1.1*   SGOT  --   --   --  24   ALT  --   --   --  23   INR  --   --  1.2*  --        Signed: Igor Martinez MD

## 2019-09-05 NOTE — PROGRESS NOTES
Problem: Mobility Impaired (Adult and Pediatric)  Goal: *Acute Goals and Plan of Care (Insert Text)  Description  FUNCTIONAL STATUS PRIOR TO ADMISSION: Patient was independent and active without use of DME. HHOME SUPPORT PRIOR TO ADMISSION: The patient lived alone with no local support. Physical Therapy Goals  Initiated 9/5/2019    1. Patient will move from supine to sit and sit to supine  in bed with minimal assistance/contact guard assist within 4 days. 2. Patient will perform sit to stand with minimal assistance/contact guard assist within 4 days. 3. Patient will ambulate with minimal assistance/contact guard assist for 25 feet with the least restrictive device within 4 days. 4. Patient will perform hip home exercise program with minimal assistance/contact guard assist within 4 days. 9/5/2019 1603 by Tigre Salcedo PT  Outcome: Progressing Towards Goal   PHYSICAL THERAPY TREATMENT  Patient: Keo Pratt (22 y.o. male)  Date: 9/5/2019  Diagnosis: Fracture of unspecified part of neck of left femur, [facility] [S72.002A] <principal problem not specified>  Procedure(s) (LRB):  LEFT HIP HEMIARTHROPLASTY (Left) 1 Day Post-Op  Precautions: Fall, WBAT  Chart, physical therapy assessment, plan of care and goals were reviewed. ASSESSMENT  Based on the objective data described below, patient continues to be limited by increased confusion(now unable to state the month or year and does not know where he is despite multiple attempts at reorientation), LE weakness and pain with ambulation. Noted desaturation(84%) with mobility and tachycardia(125BPM) at rest. Re instructed with IS-poor recall compared to this morning. Pt did progress gait to doorway though requires up to Mod A x 2 d/t buckling with weight bearing on LLE. Responds well to cues to off-load with UEs and take smaller steps.  Wheeled back into room and transferred back to bed d/t confusion and safety concerns with no supervision in the room. Hopeful confusion will decrease with time. Current Level of Function Impacting Discharge (mobility/balance): A x 2 for mobility     Other factors to consider for discharge: lives alone         PLAN :  Patient continues to benefit from skilled intervention to address the above impairments. Continue treatment per established plan of care. to address goals. Recommendation for discharge: (in order for the patient to meet his/her long term goals)  Therapy 3 hours per day 5-7 days per week    This discharge recommendation:  Has been made in collaboration with the attending provider and/or case management    Equipment recommendations for successful discharge (if) home: to be determined by rehab facility       SUBJECTIVE:   Patient stated Giovanni Re are two bags of donuts that I left downstairs. Do you know where that is? Rodolfo Montoya    OBJECTIVE DATA SUMMARY:   Critical Behavior:  Neurologic State: Alert, Confused  Orientation Level: Oriented to person, Disoriented to time, Disoriented to place, Disoriented to situation  Cognition: Follows commands, Impaired decision making  Safety/Judgement: Decreased awareness of need for assistance, Decreased insight into deficits  Functional Mobility Training:  Bed Mobility:  Rolling: Minimum assistance;Assist x2  Supine to Sit: Minimum assistance;Assist x2  Sit to Supine: Moderate assistance;Assist x2  Scooting: Minimum assistance        Transfers:  Sit to Stand: Minimum assistance;Assist x2  Stand to Sit: Minimum assistance;Assist x2                             Balance:  Sitting: Impaired  Sitting - Static: Good (unsupported)  Sitting - Dynamic: Fair (occasional)  Standing: Impaired  Standing - Static: Fair  Standing - Dynamic : Poor  Ambulation/Gait Training:  Distance (ft): 15 Feet (ft)  Assistive Device: Gait belt;Walker, rolling  Ambulation - Level of Assistance:  Moderate assistance;Assist x2        Gait Abnormalities: Antalgic;Decreased step clearance; Step to gait     Left Side Weight Bearing: As tolerated  Base of Support: Narrowed  Stance: Left decreased  Speed/Milka: Pace decreased (<100 feet/min); Slow  Step Length: Right shortened;Left shortened  Swing Pattern: Left asymmetrical;Right asymmetrical                 Stairs: Therapeutic Exercises:     Pain Rating:  None reported    Activity Tolerance:   Good  Please refer to the flowsheet for vital signs taken during this treatment.     After treatment patient left in no apparent distress:   Supine in bed, Heels elevated for pressure relief, Call bell within reach, Bed / chair alarm activated and Side rails x 3    COMMUNICATION/COLLABORATION:   The patients plan of care was discussed with: Registered Nurse    Flavia Solano, PT   Time Calculation: 25 mins

## 2019-09-05 NOTE — PROGRESS NOTES
Problem: Mobility Impaired (Adult and Pediatric)  Goal: *Acute Goals and Plan of Care (Insert Text)  Description  FUNCTIONAL STATUS PRIOR TO ADMISSION: Patient was independent and active without use of DME. HHOME SUPPORT PRIOR TO ADMISSION: The patient lived alone with no local support. Physical Therapy Goals  Initiated 9/5/2019    1. Patient will move from supine to sit and sit to supine  in bed with minimal assistance/contact guard assist within 4 days. 2. Patient will perform sit to stand with minimal assistance/contact guard assist within 4 days. 3. Patient will ambulate with minimal assistance/contact guard assist for 25 feet with the least restrictive device within 4 days. 4. Patient will perform hip home exercise program with minimal assistance/contact guard assist within 4 days. Outcome: Progressing Towards Goal   PHYSICAL THERAPY EVALUATION  Patient: Carlitos Cooley (36 y.o. male)  Date: 9/5/2019  Primary Diagnosis: Fracture of unspecified part of neck of left femur, initial encounter for closed fracture ([facility] [S72.002A]  Procedure(s) (LRB):  LEFT HIP HEMIARTHROPLASTY (Left) 1 Day Post-Op   Precautions:   Fall, WBAT      ASSESSMENT  Based on the objective data described below, the patient presents AAOx1, self only today. He was able to tolerate minimal functional mobility and education today but limited by overall strength, endurance, pain, and post-op confusion. Patient lives alone and was completely independent to include driving and gardening. He has a supportive neighbor and his family lives out of town. Today pt was found sitting EOB and was able to stand and take several sidesteps forwards, backwards and laterally towards HOB. Requires frequent verbal and visual cueing to complete tasks. Anticipate the patient would need post-acute rehabilitation following dc from hospital, feel he could tolerate intensive therapy 3 hours a day.      Current Level of Function Impacting Discharge (mobility/balance): Mod A x 2 to transfer to standing, inability to walk >5ft    Functional Outcome Measure: The patient scored 25 on the Barthel outcome measure which is indicative of high impairment. Other factors to consider for discharge: Lives alone     Patient will benefit from skilled therapy intervention to address the above noted impairments. PLAN :  Recommendations and Planned Interventions: bed mobility training, transfer training, gait training, therapeutic exercises, neuromuscular re-education, patient and family training/education and therapeutic activities      Frequency/Duration: Patient will be followed by physical therapy:  twice daily to address goals.     Recommendation for discharge: (in order for the patient to meet his/her long term goals)  Therapy 3 hours per day 5-7 days per week    This discharge recommendation:  Has not yet been discussed the attending provider and/or case management    Equipment recommendations for successful discharge (if) home: to be determined by rehab facility         SUBJECTIVE:   Patient stated I drove here didn't I?.    OBJECTIVE DATA SUMMARY:   HISTORY:    Past Medical History:   Diagnosis Date    CAD (coronary artery disease)     DDD (degenerative disc disease), lumbar     Sciatica      Past Surgical History:   Procedure Laterality Date    HX CORONARY ARTERY BYPASS GRAFT         Personal factors and/or comorbidities impacting plan of care: CAD, post-op confusion    Home Situation  Home Environment: Private residence  Living Alone: Yes  Support Systems: Friends \ neighbors  Patient Expects to be Discharged to[de-identified] Rehabilitation facility    EXAMINATION/PRESENTATION/DECISION MAKING:   Critical Behavior:  Neurologic State: Alert, Confused  Orientation Level: Oriented to person, Disoriented to time, Disoriented to place, Disoriented to situation  Cognition: Follows commands, Impaired decision making  Safety/Judgement: Decreased awareness of need for assistance, Decreased insight into deficits  Hearing: Auditory  Auditory Impairment: None  Skin:    Edema:   Range Of Motion:  AROM: Generally decreased, functional           PROM: Generally decreased, functional           Strength:    Strength: Generally decreased, functional                    Tone & Sensation:   Tone: Normal              Sensation: Intact               Coordination:  Coordination: Within functional limits  Vision:   Acuity: Within Defined Limits  Corrective Lenses: Glasses  Functional Mobility:  Bed Mobility:  Rolling: Minimum assistance     Sit to Supine: Minimum assistance;Assist x2  Scooting: Minimum assistance  Transfers:  Sit to Stand: Moderate assistance;Assist x2  Stand to Sit: Moderate assistance;Assist x2                       Balance:   Sitting: Impaired  Sitting - Static: Fair (occasional)  Sitting - Dynamic: Fair (occasional)  Standing: Impaired  Standing - Static: Fair  Standing - Dynamic : Poor  Ambulation/Gait Training:                       Left Side Weight Bearing: As tolerated                                 Stairs: Therapeutic Exercises:       Functional Measure:  Barthel Index:    Bathin  Bladder: 5  Bowels: 5  Groomin  Dressin  Feedin  Mobility: 5  Stairs: 0  Toilet Use: 0  Transfer (Bed to Chair and Back): 5  Total: 25/100       The Barthel ADL Index: Guidelines  1. The index should be used as a record of what a patient does, not as a record of what a patient could do. 2. The main aim is to establish degree of independence from any help, physical or verbal, however minor and for whatever reason. 3. The need for supervision renders the patient not independent. 4. A patient's performance should be established using the best available evidence. Asking the patient, friends/relatives and nurses are the usual sources, but direct observation and common sense are also important. However direct testing is not needed.   5. Usually the patient's performance over the preceding 24-48 hours is important, but occasionally longer periods will be relevant. 6. Middle categories imply that the patient supplies over 50 per cent of the effort. 7. Use of aids to be independent is allowed. Marino Li., Barthel, D.W. (8999). Functional evaluation: the Barthel Index. [address] [facility] (14)2. EMEKA Quiñonez, Kasia Kyle., Guillermo Keller., [place], [address] [facility] (1999). Measuring the change indisability after inpatient rehabilitation; comparison of the responsiveness of the Barthel Index and Functional Brogue Measure. Journal of Neurology, Neurosurgery, and Psychiatry, 66(4), 276-395. Leo Yun, N.J.A, KERRY Ornelas, & Claire Canales M.A. (2004.) Assessment of post-stroke quality of life in cost-effectiveness studies: The usefulness of the Barthel Index and the EuroQoL-5D. Quality of Life Research, 15, 326-10            Physical Therapy Evaluation Charge Determination   History Examination Presentation Decision-Making   MEDIUM  Complexity : 1-2 comorbidities / personal factors will impact the outcome/ POC  MEDIUM Complexity : 3 Standardized tests and measures addressing body structure, function, activity limitation and / or participation in recreation  LOW Complexity : Stable, uncomplicated  Other outcome measures Barthel  LOW       Based on the above components, the patient evaluation is determined to be of the following complexity level: LOW     Pain Rating:      Activity Tolerance:   Fair, desaturates with exertion and requires oxygen and requires frequent rest breaks  Please refer to the flowsheet for vital signs taken during this treatment.     After treatment patient left in no apparent distress:   Supine in bed, Heels elevated for pressure relief, Call bell within reach, Bed / chair alarm activated, Caregiver / family present and Side rails x 3    COMMUNICATION/EDUCATION:   The patients plan of care was discussed with: Occupational Therapist and Registered Nurse. Fall prevention education was provided and the patient/caregiver indicated understanding., Patient/family have participated as able in goal setting and plan of care. and Patient/family agree to work toward stated goals and plan of care.     Thank you for this referral.  Kurtis Farrell, PT   Time Calculation: 42 mins

## 2019-09-05 NOTE — PROGRESS NOTES
Problem: Self Care Deficits Care Plan (Adult)  Goal: *Acute Goals and Plan of Care (Insert Text)  Description  FUNCTIONAL STATUS PRIOR TO ADMISSION: Patient was indepenent at home without DME per his report and confirmed by his neighbor. The patient completed all his own ADLs and IADLs to include driving and gardening. HOME SUPPORT: The patient lived alone with a supportive community to provide assistance. Per his neighbor who was present during session his family does not live locally but they do check in occasionally. He travels to [place] to live with his daughter starting in December until late March. Occupational Therapy Goals  Initiated 9/5/2019  1. Patient will perform lower body ADLs with AE supervision/set-up within 4 day(s). 2.  Patient will perform upper body ADLs standing 5 mins without fatigue or LOB with supervision/set-up within 4 day(s). 3.  Patient will perform toilet transfer with supervision/set-up within 4 day(s). 4.  Patient will perform all aspects of toileting with supervision/set-up within 4 day(s). 5.  Patient will participate in upper extremity therapeutic exercise/activities with supervision/set-up for 10 minutes within 4 day(s). 6.  Patient will utilize energy conservation techniques during functional activities without cues within 4 day(s). Outcome: Progressing Towards Goal    OCCUPATIONAL THERAPY EVALUATION  Patient: Jose Antonio Ventura (88 y.o. male)  Date: 9/5/2019  Primary Diagnosis: Fracture of unspecified part of neck of left femur, initial encounter for closed fracture ([facility] [address] [S72.002A]  Procedure(s) (LRB):  LEFT HIP HEMIARTHROPLASTY (Left) 1 Day Post-Op   Precautions: fall       ASSESSMENT  Based on the objective data described below, the patient presents AAOx1, self only today. He was able to tolerate minimal functional mobility and education today but limited by overall strength, endurance, pain, and cognition. He maintains strong UEs to assist with bed mobility. The patient was pleasant and easy to re-orient to place/time. He has a supportive neighbor and his family lives out of town. He was totally indepenent, driving and tending to his garden daily at baseline. Anticipate the patient would need post-acute rehabilitation following dc from hospital, feel he could tolerate intensive therapy 5x a week. Current Level of Function Impacting Discharge (ADLs/self-care): max A  for LB ADLs, mod A to min A for UB ADLs, mod A x2 for functional mobility     Functional Outcome Measure: The patient scored 20/100 on the barthel outcome measure which is indicative of 80% impairment of ADLs and IADLs. Other factors to consider for discharge: level of support at home, level of independence at baseline     Patient will benefit from skilled therapy intervention to address the above noted impairments. PLAN :  Recommendations and Planned Interventions: self care training, functional mobility training, therapeutic exercise, therapeutic activities, cognitive retraining, endurance activities, patient education, home safety training and family training/education    Frequency/Duration: Patient will be followed by occupational therapy 5 times a week to address goals. Recommendation for discharge: (in order for the patient to meet his/her long term goals)  Therapy 3 hours per day 5-7 days per week    This discharge recommendation:  Has been made in collaboration with the attending provider and/or case management    Equipment recommendations for successful discharge (if) home: to be determined by rehab facility       SUBJECTIVE:   Patient stated do you want some of my birthday cake? Today is my birthday.  Patient's birthday was in [birth year].      OBJECTIVE DATA SUMMARY:   HISTORY:   Past Medical History:   Diagnosis Date    CAD (coronary artery disease)     DDD (degenerative disc disease), lumbar     Sciatica      Past Surgical History:   Procedure Laterality Date    HX CORONARY ARTERY BYPASS GRAFT         Expanded or extensive additional review of patient history:   Patient was independent and active for his age at baseline. He did not use DME and lives alone in his home. He has supportive neighbors. Most of his family lives in [place]. Hand dominance: Right    EXAMINATION OF PERFORMANCE DEFICITS:  Cognitive/Behavioral Status:  Neurologic State: Alert;Confused  Orientation Level: Oriented to person;Disoriented to time;Disoriented to place; Disoriented to situation  Cognition: Follows commands; Impaired decision making  Perception: Appears intact  Perseveration: No perseveration noted  Safety/Judgement: Decreased awareness of need for assistance;Decreased insight into deficits    Skin: all visible areas intact     Edema: none noted    Hearing: Auditory  Auditory Impairment: None    Vision/Perceptual:                           Acuity: Within Defined Limits    Corrective Lenses: Glasses    Range of Motion:    AROM: Generally decreased, functional  PROM: Generally decreased, functional                      Strength:    Strength: Within functional limits                Coordination:  Coordination: Within functional limits  Fine Motor Skills-Upper: Left Intact; Right Intact    Gross Motor Skills-Upper: Left Intact; Right Intact    Tone & Sensation:    Tone: Normal  Sensation: Intact                      Balance:  Sitting: Impaired  Sitting - Static: Fair (occasional)  Sitting - Dynamic: Fair (occasional)  Standing: Impaired  Standing - Static: Fair;Constant support  Standing - Dynamic : Fair;Constant support    Functional Mobility and Transfers for ADLs:  Bed Mobility:  Rolling: Minimum assistance  Sit to Supine: Minimum assistance;Assist x2  Scooting: Minimum assistance    Transfers:  Sit to Stand: Moderate assistance;Assist x2  Stand to Sit: Moderate assistance;Assist x2  Toilet Transfer :  Moderate assistance;Assist x2  Assistive Device : Walker, rolling    ADL Assessment:  Patient recalled and demonstrated avoiding extreme planes of movement with Left LE during ADLs and functional mobility with verbal cues. Feeding: Setup    Oral Facial Hygiene/Grooming: Setup    Bathing: Moderate assistance    Upper Body Dressing: Minimum assistance    Lower Body Dressing: Maximum assistance    Toileting: Maximum assistance                ADL Intervention and task modifications:     Education provided for recovery, use of phone for meals and IS    Cognitive Retraining  Safety/Judgement: Decreased awareness of need for assistance;Decreased insight into deficits      Therapeutic Exercise:  Sit<>Stand: mod A x1, min A x1 total assistance level of 2. Patient is slightly impulsive and needs strong verbal cues for safety  Functional movement in room: side stepping and mimic of transfer to toilet, min A x1/mod A x1     Functional Measure:  Barthel Index:    Bathin  Bladder: 5  Bowels: 5  Groomin  Dressin  Feedin  Mobility: 5  Stairs: 0  Toilet Use: 0  Transfer (Bed to Chair and Back): 5  Total: 25/100        The Barthel ADL Index: Guidelines  1. The index should be used as a record of what a patient does, not as a record of what a patient could do. 2. The main aim is to establish degree of independence from any help, physical or verbal, however minor and for whatever reason. 3. The need for supervision renders the patient not independent. 4. A patient's performance should be established using the best available evidence. Asking the patient, friends/relatives and nurses are the usual sources, but direct observation and common sense are also important. However direct testing is not needed. 5. Usually the patient's performance over the preceding 24-48 hours is important, but occasionally longer periods will be relevant. 6. Middle categories imply that the patient supplies over 50 per cent of the effort. 7. Use of aids to be independent is allowed. Barabara Files., Barthel, D.W. (3819).  Functional evaluation: the Barthel Index. [address] [facility] (14)2. EMEKA Cummings, Akbar De La Rosa., Nicolette Suh., Kali, [address] (1999). Measuring the change indisability after inpatient rehabilitation; comparison of the responsiveness of the Barthel Index and Functional [place] Measure. Journal of Neurology, Neurosurgery, and Psychiatry, 66(4), 569-283. LIDIA Medrano, KERRY Ornelas, & Bayard Peabody, M.A. (2004.) Assessment of post-stroke quality of life in cost-effectiveness studies: The usefulness of the Barthel Index and the EuroQoL-5D. Quality of Life Research, 15, 378-12         Occupational Therapy Evaluation Charge Determination   History Examination Decision-Making   LOW Complexity : Brief history review  MEDIUM Complexity : 3-5 performance deficits relating to physical, cognitive , or psychosocial skils that result in activity limitations and / or participation restrictions MEDIUM Complexity : Patient may present with comorbidities that affect occupational performnce. Miniml to moderate modification of tasks or assistance (eg, physical or verbal ) with assesment(s) is necessary to enable patient to complete evaluation       Based on the above components, the patient evaluation is determined to be of the following complexity level: LOW   Pain Rating:  Not rated    Activity Tolerance:   Fair- SOB noted during treatment with cues needed for deep breathing  Please refer to the flowsheet for vital signs taken during this treatment. After treatment patient left in no apparent distress:    Supine in bed, Call bell within reach, Bed / chair alarm activated and Caregiver / family present    COMMUNICATION/EDUCATION:   The patients plan of care was discussed with: Physical Therapist and Registered Nurse. Home safety education was provided and the patient/caregiver indicated understanding., Patient/family have participated as able in goal setting and plan of care.  and Patient/family agree to work toward stated goals and plan of care. This patients plan of care is appropriate for delegation to [facility].     Thank you for this referral.  Mary Patel  Time Calculation: 38 mins

## 2019-09-05 NOTE — PROGRESS NOTES
Primary Nurse Gary Bender RN performed a dual skin assessment on this patient No impairment noted  Mark score is 18     Pt's sacrum is pink. Pt was turned on the left side. Wound care is following.

## 2019-09-05 NOTE — PROGRESS NOTES
Orthopaedics Daily Progress Note                            Date of Surgery:  9/4/2019      Patient: Mynor Gould   YOB: 1927  Age: 80 y.o. SUBJECTIVE:   1 Day Post-Op following LEFT HIP HEMIARTHROPLASTY. The patient's post operative pain is controlled. No CP/SOB. No N/V. The patient's mobility will be evaluated today during PT sessions. OBJECTIVE:     Vital Signs:      Visit Vitals  /70   Pulse 68   Temp 97.6 °F (36.4 °C)   Resp 16   Ht 5' 10\" (1.778 m)   Wt 63.2 kg (139 lb 5.3 oz)   SpO2 100%   BMI 19.99 kg/m²       Physical Exam:  General: A&Ox3. The patient is cooperative, and in no acute distress. Respiratory: Respirations are unlabored. Surgical site(s): dressing clean, dry  Musculoskeletal: Calves are soft, supple, and non-tender upon palpation. Motor 5/5. Neurological:  Neurovascularly intact with good dorsi and plantar flexion. Pulses symmetrical.    Laboratory Values:             Recent Results (from the past 12 hour(s))   METABOLIC PANEL, BASIC    Collection Time: 09/05/19  2:35 AM   Result Value Ref Range    Sodium 144 136 - 145 mmol/L    Potassium 4.5 3.5 - 5.1 mmol/L    Chloride 111 (H) 97 - 108 mmol/L    CO2 24 21 - 32 mmol/L    Anion gap 9 5 - 15 mmol/L    Glucose 135 (H) 65 - 100 mg/dL    BUN 38 (H) 6 - 20 MG/DL    Creatinine 1.43 (H) 0.70 - 1.30 MG/DL    BUN/Creatinine ratio 27 (H) 12 - 20      GFR est AA 56 (L) >60 ml/min/1.73m2    GFR est non-AA 46 (L) >60 ml/min/1.73m2    Calcium 8.4 (L) 8.5 - 10.1 MG/DL   HEMOGLOBIN    Collection Time: 09/05/19  2:35 AM   Result Value Ref Range    HGB 10.3 (L) 12.1 - 17.0 g/dL         PLAN:     S/P LEFT HIP HEMIARTHROPLASTY -Continue WBAT. -Mobilize and continue with PT/OT until discharged     Hemodynamics Hgb today is 10.3. Acute blood loss anemia as expected. Patient asymptomatic. Continue to monitor. Wound Monitor postop dressing; no postop dressing changes necessary. Reinforce PRN.      Post Operative Pain Pain Control: stable, mild-to-moderate joint symptoms intermittently, reasonably well controlled by current meds. DVT Prophylaxis Continue with SCD'S, Ankle Pump Exercises. Lovenox 40mg daily while in house, d/c with ASA 81mg BID     Discharge Disposition Discharge plan: SNF tomorrow or Saturday, per primary.        Signed By: Debra Osborne PA-C  September 5, 2019 8:09 AM

## 2019-09-05 NOTE — OP NOTES
[address]   OPERATIVE REPORT    Name:  Drew Pickett  MR#:  [id number]  :  [id number]  ACCOUNT #:  [de-identified]  DATE OF SERVICE:  2019      PREOPERATIVE DIAGNOSIS:  Displaced fracture of left femoral neck. POSTOPERATIVE DIAGNOSIS:  Displaced fracture of left femoral neck. PROCEDURE PERFORMED:  Left hip hemiarthroplasty. SURGEON:  Robinson Hopper MD    ASSISTANT:  Eliseo Baez PA-C    ANESTHESIA:  General.    COMPLICATIONS:  None. SPECIMENS REMOVED:  None. IMPLANTS:  Components implanted, DePuy size 8 standard offset ACTIS femoral stem with 53 mm +5 bipolar femoral head. ESTIMATED BLOOD LOSS:  200 mL. INDICATIONS:  The patient is a 78-year-old gentleman who suffered a mechanical ground-level fall yesterday when he tripped on the edge of his neighbor's driveway. He had sudden severe left hip pain and inability to ambulate. Radiographs demonstrate displaced subcapital fracture of the left femoral neck. He presents to the operating room for definitive treatment with hip hemiarthroplasty to maximize functional recovery. Risks, benefits, and alternatives of procedure were reviewed with the patient and his family in detail and they desired to proceed. PROCEDURE IN DETAIL:  The patient was taken to the operating room where general anesthesia was induced on his hospital bed. He was moved to the operating table and turned to right lateral decubitus position on a Slade frame hip positioner. All bony prominences were well padded and an axillary roll was placed. Left hip and thigh were prepped and draped in usual sterile fashion. Through a lateral hip incision, I performed a posterior approach to the left hip. Short rotators and posterior capsule were reflected posteriorly. The acetabular labrum was left intact. Hip was flexed and internally rotated and fresh femoral neck osteotomy was made.   Femoral head was removed with a corkscrew and a 53 mm trial head produced the best suction fit in the acetabulum. A portion of the anterior superior capsule was excised to prevent external impingement. Femur was prepared with ACTIS broaches up to a size 8 before achieving rotational stability. Calcar was planed. Based on native anatomy, hip was reduced with a standard offset neck trial.  53 +5 bipolar head trial produced satisfactory leg length and soft tissue tension. Hip was stable to full extension and external rotation, stable to straight hyperflexion, and with the hip flexed 90 degrees in neutral abduction, there was greater than 70 to 80 degrees of internal rotation. Trials were removed and the wound was copiously irrigated by pulse lavage before the real femoral components were implanted. Same leg length and stability were achieved. Periarticular soft tissues were injected with a solution containing 0.5% ropivacaine with epinephrine as well as clonidine and Toradol. 2 g of topical tranexamic acid were applied to the wound. Posterior capsule was repaired to the inner aspect of posterior greater trochanter through drill holes using #2 Ethibond sutures. Deep fascia was closed with a combination of heavy Vicryl sutures and a running #2 Stratafix suture. Skin and subcutaneous layers were closed in layered fashion with Vicryl and a running Monocryl subcuticular stitch. The wound was dressed with Dermabond and an Aquacel occlusive dressing. The patient was awakened, extubated, and transported to the postanesthesia care unit in stable condition. All counts were correct at the end of the procedure. The physician assistant was critical throughout the case to assist with positioning, retraction, and closure. There were no other available residents, fellows, or surgical assistants available to assist during this procedure.         MD EDNA [email or web address]  D:  09/04/2019 17:54  T:  09/04/2019 18:01  JOB #:  [id number]  CC:  Ema Roldan MD Dorene Ugarte MD

## 2019-09-05 NOTE — PROGRESS NOTES
Bedside and Verbal shift change report given to Padmaja García RN (oncoming nurse) by Arianne Naqvi (offgoing nurse). Report included the following information SBAR, Kardex, Intake/Output, MAR and Recent Results.

## 2019-09-05 NOTE — PROGRESS NOTES
Patient refusing to take his lovenox injection and myralax. States he doesn't like taking medicine. I explained the purpose of these medications and the doctor ordered it. I paged and spoke with Dr. Yamil Mckeon and reported above. He stated he would speak to the patient.

## 2019-09-05 NOTE — PROGRESS NOTES
JYOTI: Referral sent to [facility] via [employer]. 2nd choice will be [facility] if needed. The patient will need insurance authorization for admission. CRM called the daughter Darron to discuss discharge [phone]. The patient was experiencing some confusion this PM. The patient is normally clear, Independent, and still drives. The patient has a good community support system in place. Therapy has recommended acute rehab. Referral sent to [facility] via [employer]. Will follow.      Lexie Meza, [address] [place] [address] Avenue   [phone]

## 2019-09-06 NOTE — PROGRESS NOTES
Problem: Mobility Impaired (Adult and Pediatric)  Goal: *Acute Goals and Plan of Care (Insert Text)  Description  FUNCTIONAL STATUS PRIOR TO ADMISSION: Patient was independent and active without use of DME. HHOME SUPPORT PRIOR TO ADMISSION: The patient lived alone with no local support. Physical Therapy Goals  Initiated 9/5/2019    1. Patient will move from supine to sit and sit to supine  in bed with minimal assistance/contact guard assist within 4 days. 2. Patient will perform sit to stand with minimal assistance/contact guard assist within 4 days. 3. Patient will ambulate with minimal assistance/contact guard assist for 25 feet with the least restrictive device within 4 days. 4. Patient will perform hip home exercise program with minimal assistance/contact guard assist within 4 days. Outcome: Progressing Towards Goal  PHYSICAL THERAPY TREATMENT  Patient: Cj Syed (1 y.o. male)  Date: 9/6/2019  Diagnosis: Fracture of unspecified part of neck of left femur, initial encounter for closed fracture ([facility] [address] [S72.002A] <principal problem not specified>  Procedure(s) (LRB):  LEFT HIP HEMIARTHROPLASTY (Left) 2 Days Post-Op  Precautions: Fall, WBAT  Chart, physical therapy assessment, plan of care and goals were reviewed. ASSESSMENT  (AM SESSION): Based on the objective data described below, pt continues to require Min A-CGAx2 for functional transfers. Pt w/ less confusion today, able to recall events leading up to hospitalization. Continues to tolerate short gait w/ Min A-CGA x1-2. Pt agreeable to sitting up in chair and instructed to call for NSG when ready to get back to bed. Instructed to sit no longer than 1 hr (time written on whiteboard). Reviewed how to use call bell as TV remote as well as how to call RN. Pt verbalized understanding. (PM SESSION 12:25-12:45): Pt OOB in chair upon arrival, however chair alarm sounding as pt attempting to stand.  Pt in need to use BR, stated \"It's 1 and 2\". Continues to require Min-CGA for sit<>stasnd from chair and elevated toilet seat. 18 Ft again this afternoon and deferred further distance d/t fatigue. Noted pt SOB w/ short gait. SpO2 88% one returned to chair which quickly recovered to 93% on RA. Pt remained seated up in chair. RN present for meds. Again, instructed pt not to sit longer than 1hr (time written again on white board, RN aware also). Current Level of Function Impacting Discharge (mobility/balance): CGA-Min Ax1-2  for functional transfers and gait w/ RW. Other factors to consider for discharge: Lives alone, no local support (daughters live out of state per chart review). PLAN :  Patient continues to benefit from skilled intervention to address the above impairments. Continue treatment per established plan of care. to address goals. Recommendation for discharge: (in order for the patient to meet his/her long term goals)  Therapy 3 hours per day 5-7 days per week    This discharge recommendation:  Has been made in collaboration with the attending provider and/or case management    Equipment recommendations for successful discharge (if) home: to be determined by rehab facility       SUBJECTIVE:   Patient stated I have to use the bathroom. 1 and 2.    OBJECTIVE DATA SUMMARY:   Critical Behavior:  Neurologic State: Alert, Confused  Orientation Level: Oriented X4  Cognition: Follows commands  Safety/Judgement: Decreased awareness of need for assistance, Decreased insight into deficits  Functional Mobility Training:  Bed Mobility:     Supine to Sit: (OOB in chair)  Sit to Supine: (returned to chair at bedside)           Transfers:  Sit to Stand: Contact guard assistance;Assist x1(from chair)  Stand to Sit: Contact guard assistance;Assist x1                             Balance:  Sitting: Intact; With support(sitting chair)  Sitting - Static: Good (unsupported)  Sitting - Dynamic: Fair (occasional)  Standing: Impaired  Standing - Static: Constant support; Fair  Standing - Dynamic : Constant support; Fair  Ambulation/Gait Training:  Distance (ft): 18 Feet (ft)  Assistive Device: Gait belt;Walker, rolling  Ambulation - Level of Assistance: Contact guard assistance;Minimal assistance;Assist x1        Gait Abnormalities: Antalgic;Decreased step clearance; Step to gait  Right Side Weight Bearing: Full  Left Side Weight Bearing: As tolerated  Base of Support: Narrowed  Stance: Left decreased  Speed/Milka: Pace decreased (<100 feet/min); Shuffled; Slow  Step Length: Left shortened;Right shortened  Swing Pattern: Left asymmetrical;Right asymmetrical                   Therapeutic Exercises (AM session):   SUPINE  EXERCISES   Sets   Reps   Active Active Assist   Passive Self ROM   Comments   Ankle Pumps   ? ?                                           ?                                           ?                                              Quad Sets   ? ?                                           ?                                           ?                                              Heel Slides   ? ?                                           ?                                           ?                                              Hip Abduction   ? ?                                           ?                                           ?                                              Hip External Rotation   ? ?                                           ?                                           ?                                              Glut Sets   ?                                            ?                                           ?                                           ?                                                 ? ?                                           ?                                           ?                                                 ?                                           ?                                           ?                                           ?                                                STANDING  EXERCISES   Sets   Reps   Active Active Assist   Passive Self ROM   Comments   Heel Raises  5 ?                                           ?                                           ?                                           ?                                           Standing, RW, CGA   Hip Abduction  5 ?                                           ?                                           ?                                           ?                                              Hip External Rotation   ? ?                                           ?                                           ?                                              Hip Flexor Stretch   ? ?                                           ?                                           ?                                              Mini squats   ? ?                                           ?                                           ?                                              Hamstring Curl   ? ?                                           ?                                           ?                                                      Activity Tolerance:   Fair  Please refer to the flowsheet for vital signs taken during this treatment.     After treatment patient left in no apparent distress:   Sitting in chair, Call bell within reach and Bed / chair alarm activated    COMMUNICATION/COLLABORATION:   The patients plan of care was discussed with: Registered Nurse    Clarisa Watts,PTA   Time Calculation: 20 mins

## 2019-09-06 NOTE — PROGRESS NOTES
SEAN spoke with Spenser Nelson with [facility]. He stated that he thinks this pt is a good candidate, but he has not accepted this pt yet. SEAN will follow.  Electa Page

## 2019-09-06 NOTE — PROGRESS NOTES
Verbal shift change report given to Danna Watts RN (oncoming nurse) by Simran Jones RN (offgoing nurse). Report included the following information SBAR, Kardex, Procedure Summary, MAR and Recent Results.

## 2019-09-06 NOTE — PROGRESS NOTES
Bedside and Verbal shift change report given to Shannan Navarrete (oncoming nurse) by Naomi Mchugh (offgoing nurse). Report included the following information SBAR, Kardex, Intake/Output and MAR.

## 2019-09-06 NOTE — PROGRESS NOTES
09/06/19 1619   Vital Signs   Pulse (Heart Rate) (!) 114   O2 Sat (%) 98 %   /74   MAP (Calculated) 106       Patient's BP is as above. Gave hydralazine 10mg  IV at 1416. BP came down to lowest of 157/71 HR: 101. Patient having no pain and was able to void 60ml. Bladder scanned post void and patient retaining 318ml. Paged Dr. Garay Able to report, awaiting call back. 1700:  Dr. Garay Able called back and I reported above. He ordered 50mg bid po metoprolol, include dose now. Continuing to monitor.

## 2019-09-06 NOTE — PROGRESS NOTES
Bedside and Verbal shift change report given to Prema Urbano RN (oncoming nurse) by Mendy Carrillo (offgoing nurse). Report included the following information SBAR.

## 2019-09-06 NOTE — PROGRESS NOTES
Hospitalist Progress Note    NAME: Carlitos Cooley   :  3/18/1927   MRN:  [id number]       Assessment / Plan:    ASSESSMENT AND PLAN:    The patient is a 58-year-old male with Left femoral neck fracture. Hospitalist consulted for admission and surgical clearance. 1.  The patient has medical history significant for coronary artery disease with valvular replacement   The patient does not have any acute cardiac issues that needs intervention. He had an EKG, which showed first-degree AV block and normal chest x-ray. His blood pressure at the time of the examination is elevated, now controlled. consulted Cardiology for  evaluation and optimize his cardiovascular reserve prior to procedure. S/P PROCEDURE PERFORMED:  Left hip hemiarthroplasty. 9.4.19       2. History of coronary artery disease, status post coronary artery bypass graft. The patient is taking aspirin 325 mg.  3.  Deep venous thrombosis prophylaxis. Lovenox      CODE STATUS:  Full code.     DISPOSITION:  TBD. Body mass index is 19.99 kg/m². Code status: Full  Prophylaxis: Lovenox  Recommended Disposition: [facility]/Rehab     Subjective:     Chief Complaint / Reason for Physician Visit  \" pain controlled, feeling ok today  \". Discussed with RN events overnight. Review of Systems:  Symptom Y/N Comments  Symptom Y/N Comments   Fever/Chills    Chest Pain     Poor Appetite    Edema     Cough    Abdominal Pain     Sputum    Joint Pain     SOB/JOSEPH    Pruritis/Rash     Nausea/vomit    Tolerating PT/OT     Diarrhea    Tolerating Diet     Constipation    Other       Could NOT obtain due to:      Objective:     VITALS:   Last 24hrs VS reviewed since prior progress note.  Most recent are:  Patient Vitals for the past 24 hrs:   Temp Pulse Resp BP SpO2   19 0824 98 °F (36.7 °C) 85 16 168/88 96 %   19 0333 99 °F (37.2 °C) 86 16 151/73 96 %   19 2017 98.8 °F (37.1 °C) 83 18 131/67 96 %   19 1505 98.9 °F (37.2 °C) 89 17 142/68 97 % Intake/Output Summary (Last 24 hours) at 9/6/2019 1137  Last data filed at 9/6/2019 1108  Gross per 24 hour   Intake 1020 ml   Output 1000 ml   Net 20 ml        PHYSICAL EXAM:  General: WD, WN. Alert, cooperative, no acute distress    EENT:  EOMI. Anicteric sclerae. MMM  Resp:  CTA bilaterally, no wheezing or rales. No accessory muscle use  CV:  Regular  rhythm,  No edema  GI:  Soft, Non distended, Non tender.  +Bowel sounds  Neurologic:  Alert and oriented X 3, normal speech,   Psych:   Good insight. Not anxious nor agitated  Skin:  No rashes. No jaundice    Reviewed most current lab test results and cultures  YES  Reviewed most current radiology test results   YES  Review and summation of old records today    NO  Reviewed patient's current orders and MAR    YES  PMH/ reviewed - no change compared to H&P  ________________________________________________________________________  Care Plan discussed with:    Comments   Patient     Family      RN     Care Manager     Consultant                        Multidiciplinary team rounds were held today with , nursing, pharmacist and clinical coordinator. Patient's plan of care was discussed; medications were reviewed and discharge planning was addressed. ________________________________________________________________________  Total NON critical care TIME:  30 Minutes    Total CRITICAL CARE TIME Spent:   Minutes non procedure based      Comments   >50% of visit spent in counseling and coordination of care     ________________________________________________________________________  Bandar Adam MD     Procedures: see electronic medical records for all procedures/Xrays and details which were not copied into this note but were reviewed prior to creation of Plan. LABS:  I reviewed today's most current labs and imaging studies.   Pertinent labs include:  Recent Labs     09/06/19  0233 09/05/19  0235 09/04/19  0330   WBC  --   --  12.2*   HGB 10.3* 10.3* 11.5*   HCT  --   --  35.8*   PLT  --   --  131*     Recent Labs     09/05/19  0235 09/04/19  0330 09/03/19  1321    140  --    K 4.5 4.2  --    * 108  --    CO2 24 26  --    * 106*  --    BUN 38* 31*  --    CREA 1.43* 1.43*  --    CA 8.4* 8.7  --    INR  --   --  1.2*       Signed: Linda Mehta MD

## 2019-09-06 NOTE — PROGRESS NOTES
Problem: Self Care Deficits Care Plan (Adult)  Goal: *Acute Goals and Plan of Care (Insert Text)  Description  FUNCTIONAL STATUS PRIOR TO ADMISSION: Patient was indepenent at home without DME per his report and confirmed by his neighbor. The patient completed all his own ADLs and IADLs to include driving and gardening. HOME SUPPORT: The patient lived alone with a supportive community to provide assistance. Per his neighbor who was present during session his family does not live locally but they do check in occasionally. He travels to [place] to live with his daughter starting in December until late March. Occupational Therapy Goals  Initiated 9/5/2019  1. Patient will perform lower body ADLs with AE supervision/set-up within 4 day(s). 2.  Patient will perform upper body ADLs standing 5 mins without fatigue or LOB with supervision/set-up within 4 day(s). 3.  Patient will perform toilet transfer with supervision/set-up within 4 day(s). 4.  Patient will perform all aspects of toileting with supervision/set-up within 4 day(s). 5.  Patient will participate in upper extremity therapeutic exercise/activities with supervision/set-up for 10 minutes within 4 day(s). 6.  Patient will utilize energy conservation techniques during functional activities without cues within 4 day(s). Outcome: Progressing Towards Goal     OCCUPATIONAL THERAPY TREATMENT  Patient: Kay Cooper (22 y.o. male)  Date: 9/6/2019  Diagnosis: Fracture of unspecified part of neck of left femur, [facility] [S72.002A] <principal problem not specified>  Procedure(s) (LRB):  LEFT HIP HEMIARTHROPLASTY (Left) 2 Days Post-Op  Precautions: Fall, WBAT  Chart, occupational therapy assessment, plan of care, and goals were reviewed. ASSESSMENT  Based on the objective data described below, decreased independence with all self care activities due to continued confusion following surgery.   He is able to follow commands and does appear to be more engaged with activities but continues to requires maximal verbal and tactile cues for problem solving activities. He needed maxA to complete donning socks using sock aid. Recommend discharge to rehab setting to continue to progress independence with ADL activities and safe functional mobility. Current Level of Function Impacting Discharge (ADLs): max A for lower body dressing    Other factors to consider for discharge: post-op confusion         PLAN :  Patient continues to benefit from skilled intervention to address the above impairments. Continue treatment per established plan of care. to address goals. Recommend with staff: OOB to chair for meals    Recommend next OT session: Review and reinforce LB dressing with sock aid    Recommendation for discharge: (in order for the patient to meet his/her long term goals)  Therapy 3 hours per day 5-7 days per week    This discharge recommendation:  Has been made in collaboration with the attending provider and/or case management    Equipment recommendations for successful discharge (if) home: none       SUBJECTIVE:   Patient stated I am feeling better.     OBJECTIVE DATA SUMMARY:   Cognitive/Behavioral Status:  Neurologic State: Alert;Confused  Orientation Level: Oriented to person;Oriented to place;Oriented to situation  Cognition: Follows commands  Perception: Appears intact  Perseveration: No perseveration noted  Safety/Judgement: Decreased awareness of environment;Decreased awareness of need for assistance;Decreased awareness of need for safety    Functional Mobility and Transfers for ADLs:  Bed Mobility:  Supine to Sit: (OOB in chair)  Sit to Supine: (returned to chair at bedside)    Transfers:  Sit to Stand: Contact guard assistance;Assist x1(from chair)          Balance:  Sitting: Intact  Sitting - Static: Good (unsupported)  Sitting - Dynamic: Fair (occasional)  Standing: Impaired  Standing - Static: Fair;Constant support  Standing - Dynamic : Fair;Constant support    ADL Intervention:   LB dressing completed using AE. He noted with great difficulty with donning sock using sock aid. He attempted to pull sock aid up on foot and needed max A for tasks. Pt then attempting to don sock to sock aid and needing min A for tasks. Pt with adequate UE strength to pull up sock aid but continued noted with great difficulty. Lower Body Dressing Assistance  Dressing Assistance: Moderate assistance  Socks: Moderate assistance         Cognitive Retraining  Safety/Judgement: Decreased awareness of environment;Decreased awareness of need for assistance;Decreased awareness of need for safety    Pain:  No pain sitting in chair    Activity Tolerance:   Good  Please refer to the flowsheet for vital signs taken during this treatment.     After treatment patient left in no apparent distress:   Sitting in chair, Call bell within reach and Bed / chair alarm activated    COMMUNICATION/COLLABORATION:   The patients plan of care was discussed with: Physical Therapist and Registered Nurse    Sweetie Parsons OT  Time Calculation: 28 mins

## 2019-09-06 NOTE — CDMP QUERY
Dr Marina Dover,    Patient admitted with Lt hip fx s/p fall, noted to have BMI 19.99 with clavicle wasting and thin appearance per dietician PN. If possible, please document in progress notes and d/c summary if you are evaluating and/or treating any of the following:    =>Underweight  =>Cachexia  =>Failure to Thrive  =>Other explanation of clinical findings  =>Clinically Undetermined (no explanation for clinical findings)    The medical record reflects the following:    Risk Factors: 79 yo male, lives alone, h/o fall    Clinical Indicators: BMI 19.99, Ht 510, Wt 139. Dietician PN would appear ~20# wt loss, but time frame and actual wt trend unknown. Clavicle wasting and thin appearance.  . Pt report to dietician Pt believes he ate dinner, but had not.   Pt disinterestd in food as evidenced on PN claims he knows he lost wt; claims, \"Been too busy to eat lately\"       Treatment: Dietician consult, [facility] [address] [place] [address] [place], [address] [place] [address], [address] [place] [address]  [phone]

## 2019-09-06 NOTE — PROGRESS NOTES
Orthopaedics Daily Progress Note                            Date of Surgery:  9/4/2019      Patient: Terrie Pearson   YOB: 1927  Age: 80 y.o. SUBJECTIVE:   2 Days Post-Op following LEFT HIP HEMIARTHROPLASTY. The patient's post operative pain is controlled. No CP/SOB. No N/V. The patient's mobility will continue to be evaluated today during PT sessions. OBJECTIVE:     Vital Signs:      Visit Vitals  /88 (BP 1 Location: Left arm, BP Patient Position: At rest)   Pulse 85   Temp 98 °F (36.7 °C)   Resp 16   Ht 5' 10\" (1.778 m)   Wt 63.2 kg (139 lb 5.3 oz)   SpO2 96%   BMI 19.99 kg/m²       Physical Exam:  General: A&Ox3. The patient is cooperative, and in no acute distress. Respiratory: Respirations are unlabored. Surgical site(s): dressing clean, dry  Musculoskeletal: Calves are soft, supple, and non-tender upon palpation. Motor 5/5. Neurological:  Neurovascularly intact with good dorsi and plantar flexion. Pulses symmetrical.    Laboratory Values:             Recent Results (from the past 12 hour(s))   HEMOGLOBIN    Collection Time: 09/06/19  2:33 AM   Result Value Ref Range    HGB 10.3 (L) 12.1 - 17.0 g/dL         PLAN:     S/P LEFT HIP HEMIARTHROPLASTY -Continue WBAT. -Mobilize and continue with PT/OT until discharged     Hemodynamics Hgb today is 10.3. Acute blood loss anemia as expected. Patient asymptomatic. Continue to monitor. Wound Monitor postop dressing; no postop dressing changes necessary. Reinforce PRN. Post Operative Pain Pain Control: stable, mild-to-moderate joint symptoms intermittently, reasonably well controlled by current meds. DVT Prophylaxis Continue with SCD'S, Ankle Pump Exercises. Lovenox 40mg daily while in house, d/c with ASA 81mg BID     Discharge Disposition Discharge plan: per primary team, will likely need placement. Has used [facility] before.        Signed By: Raquel Longo PA-C  September 6, 2019 9:51 AM

## 2019-09-07 NOTE — PROGRESS NOTES
Bedside and Verbal shift change report given to Oscar Sam (oncoming nurse) by Rodolfo Contreras RN (offgoing nurse). Report included the following information SBAR, Kardex, Procedure Summary, Intake/Output, MAR, Recent Results and Alarm Parameters .

## 2019-09-07 NOTE — PROGRESS NOTES
Occupational Therapy Note:     Spoke with nursing and pt with a fall over night and has x-ray orders in place to review possible fracture. Will hold for now and follow up later as able and appropriate.        Chente Velazquez, OT

## 2019-09-07 NOTE — PROGRESS NOTES
Orthopaedics Daily Progress Note                            Date of Surgery:  9/4/2019      Patient: Geraldine Calderon   YOB: 1927  Age: 80 y.o. SUBJECTIVE:   2 Days Post-Op following LEFT HIP HEMIARTHROPLASTY. When I entered the patient's room he was found awake and alert lying on the floor next to the hospital bed. He admitted to attempting to get out of bed to sit in the chair and states that he fell to the ground. He admitted to hitting his head. He denied any pain in his left hip, back, arms, & legs. He was assisted back to bed and was able to bear weight on his bilateral LEs without pain. Otherwise, he stated he was doing \"fine\". Pain has been well controlled. OBJECTIVE:     Vital Signs:      Visit Vitals  BP (!) 182/93   Pulse 83   Temp 98.6 °F (37 °C)   Resp 16   Ht 5' 10\" (1.778 m)   Wt 63.2 kg (139 lb 5.3 oz)   SpO2 93%   BMI 19.99 kg/m²       Physical Exam:  General: A&Ox3. The patient is cooperative, and in no acute distress. Respiratory: Respirations are unlabored. Surgical site(s): dressing clean, dry  Musculoskeletal:   Spine: No midline tenderness or step-off, no paraspinal muscle pain  UE: full ROM at shoulders, elbows, wrists and hands without pain. No tenderness to palpation of entire UE b/l  LE: tolerates log roll b/l, no pain with knee flex/ext b/l. Strength 5/5 throughout in b/l LEs  Pelvis: stable without tenderness  Calves are soft, supple, and non-tender upon palpation. Motor 5/5. Neurological:  Neurovascularly intact with good dorsi and plantar flexion. Pulses symmetrical.    Laboratory Values:             No results found for this or any previous visit (from the past 12 hour(s)). PLAN:     Will check XR of the pelvis this morning to assess after the patient was found down. Primary team notified by nurse about the event. S/P LEFT HIP HEMIARTHROPLASTY -Continue WBAT.   -Mobilize and continue with PT/OT until discharged     Hemodynamics Acute blood loss anemia as expected. Patient asymptomatic. Continue to monitor. Wound Monitor postop dressing; no postop dressing changes necessary. Reinforce PRN. Post Operative Pain Pain Control: stable, mild-to-moderate joint symptoms intermittently, reasonably well controlled by current meds. DVT Prophylaxis Continue with SCD'S, Ankle Pump Exercises. Lovenox 40mg daily while in house, d/c with ASA 81mg BID     Discharge Disposition Discharge plan: per primary team, will likely need placement. Has used [facility] before.        Signed By: Trent Chacko DO  September 7, 2019 9:51 AM

## 2019-09-07 NOTE — PROGRESS NOTES
Bedside and Verbal shift change report given to Chan Bruce RN (oncoming nurse) by Gayla Eaton RN (offgoing nurse). Report included the following information SBAR, Kardex, Procedure Summary, Intake/Output, MAR and Recent Results.

## 2019-09-07 NOTE — PROGRESS NOTES
1150:  RN called patient's daughter Taurus Lange to notify her that patient experienced a fall during prior shift. RN notified Taurus Lange that patient stated he was not experiencing increased pain levels or new onset of pain in new areas following fall. RN notified her that XR of pelvis was obtained this morning afterwards. Patient moved to room closer to nursing station and ensured bed alarm was working properly. Will continue to monitor patient.

## 2019-09-07 NOTE — PROGRESS NOTES
PHYSICAL THERAPY TREATMENT  Patient: Cj Syed (6 y.o. male)  Date: 9/7/2019  Diagnosis: Fracture of unspecified part of neck of left femur, [facility] [S72.002A] <principal problem not specified>  Procedure(s) (LRB):  LEFT HIP HEMIARTHROPLASTY (Left) 3 Days Post-Op  Precautions: Fall, WBAT  Chart, physical therapy assessment, plan of care and goals were reviewed. ASSESSMENT  Based on the objective data described below, patient was received in bed with less confusion today. He was more oriented to self, place and situation. However, he still requires near-constant verbal cueing to stay on task and for safety with ambulation. He is improving with bed mobility, but does require assistance with the LLE and with trunk support with transitions. He was able to progress gait distance with less discomfort. He requires verbal cueing for RLE advancement and safety to not let the walker be out in front of him. Patient left in bedside chair with chair alarm on. Advised patient to not stay in chair for more than an hour and to call nursing when he would like to get back to bed for safety. Will continue to progress gait quality and distance. Current Level of Function Impacting Discharge (mobility/balance): Mod A for bed mobility, CGA/min A with frequent verbal cueing for ambulation. Other factors to consider for discharge: Lives alone. PLAN :  Patient continues to benefit from skilled intervention to address the above impairments. Continue treatment per established plan of care. to address goals.     Recommendation for discharge: (in order for the patient to meet his/her long term goals)  Therapy 3 hours per day 5-7 days per week    This discharge recommendation:  Has been made in collaboration with the attending provider and/or case management    Equipment recommendations for successful discharge (if) home:         SUBJECTIVE:   Patient stated I am a little less sore today. I'd like to see what I can do.     OBJECTIVE DATA SUMMARY:   Critical Behavior:  Neurologic State: Alert  Orientation Level: Oriented to person, Oriented to place, Disoriented to time  Cognition: Impulsive, Decreased attention/concentration  Safety/Judgement: Decreased awareness of environment, Decreased awareness of need for assistance, Decreased awareness of need for safety  Functional Mobility Training:  Bed Mobility:  Rolling: Minimum assistance;Assist x2  Supine to Sit: Moderate assistance;Assist x2  Sit to Supine: Moderate assistance;Assist x2  Scooting: Contact guard assistance     Transfers:  Sit to Stand: Minimum assistance  Stand to Sit: Minimum assistance          Balance:  Sitting: Intact  Standing: Impaired  Standing - Static: Fair  Standing - Dynamic : Fair;Constant support  Ambulation/Gait Training:  Distance (ft): 60 Feet (ft)  Assistive Device: Walker, rolling;Gait belt  Ambulation - Level of Assistance: Moderate assistance  Gait Description (WDL): Exceptions to WDL  Gait Abnormalities: Step to gait; Decreased step clearance; Antalgic  Base of Support: Shift to right  Stance: Left decreased  Speed/Milka: Slow;Pace decreased (<100 feet/min)  Step Length: Right shortened;Left shortened  Swing Pattern: Left asymmetrical    Pain Ratin/10. Activity Tolerance:   Fair  Please refer to the flowsheet for vital signs taken during this treatment.     After treatment patient left in no apparent distress:   Sitting in chair, Call bell within reach and Bed / chair alarm activated    COMMUNICATION/COLLABORATION:   The patients plan of care was discussed with: Registered Nurse    Arina Strauss, PT   Time Calculation: 25 mins

## 2019-09-07 NOTE — PROGRESS NOTES
Overnight report - patient with disorientation and confusion throughout the night; continuous attempts to get OOB and \"go to the kitchen, go to the living room, or go to breakfast at his restaurant\". Bed alarm set all night, continuously going off every time patient moved and/or tried to get up. Patient also did not sleep throughout the night - leading to further disorientation. RN performed rounds every 30 minutes; ensuring the bed was in the low position, call light within reach, reorientation of the patient. Everytime the patient tried to get OOB, RN discussed with the patient not to get OOB. 0630 - bed alarm went off - patient trying to use the urinal.  RN went to answer a patient's call bell    0700 - RN and orthopedic fellow looked in on the patient and found him on the floor. The fellow and RN collected the patient and got him back into bed. The fellow performed a full assessment of the patient. The patient stated to the RN that he \"eased himself off the bed to get to the chair by the window and eased himself to the ground. He stated that he thinks he hit his head, but when asked again, he denied hitting his head. The RN proceeded to call the nursing supervisor and paged the hospitalist for team 7 - Dr. Ruth Warner - who when paged stated that \"he will come see him later\". VS post fall: /75 HR 78, O2Sat 93% RR 18 Temp 98.6 (consistent with the overnight VS). 0730 - RN assessed patient - no complaints of pain, pupils brisk and reactive, no visual disturbances. 0800 - during shift report, patient disoriented to place, situation, and time; oriented to self.

## 2019-09-07 NOTE — PROGRESS NOTES
Problem: Pressure Injury - Risk of  Goal: *Prevention of pressure injury  Description  Document Mark Scale and appropriate interventions in the flowsheet. Outcome: Progressing Towards Goal  Note:   Pressure Injury Interventions:  Sensory Interventions: Assess changes in LOC, Float heels, Keep linens dry and wrinkle-free    Moisture Interventions: Absorbent underpads, Apply protective barrier, creams and emollients    Activity Interventions: PT/OT evaluation, Pressure redistribution bed/mattress(bed type), Increase time out of bed    Mobility Interventions: Float heels, Pressure redistribution bed/mattress (bed type)    Nutrition Interventions: Offer support with meals,snacks and hydration, Document food/fluid/supplement intake    Friction and Shear Interventions: Lift sheet, Apply protective barrier, creams and emollients                Problem: Patient Education: Go to Patient Education Activity  Goal: Patient/Family Education  Outcome: Progressing Towards Goal     Problem: Falls - Risk of  Goal: *Absence of Falls  Description  Document Stacey Fall Risk and appropriate interventions in the flowsheet.   Outcome: Progressing Towards Goal  Note:   Fall Risk Interventions:  Mobility Interventions: Patient to call before getting OOB, Bed/chair exit alarm    Mentation Interventions: Adequate sleep, hydration, pain control, Bed/chair exit alarm    Medication Interventions: Bed/chair exit alarm    Elimination Interventions: Call light in reach, Patient to call for help with toileting needs    History of Falls Interventions: Bed/chair exit alarm         Problem: Patient Education: Go to Patient Education Activity  Goal: Patient/Family Education  Outcome: Progressing Towards Goal     Problem: Impaired Skin Integrity/Pressure Injury Treatment  Goal: *Improvement of Existing Pressure Injury  Outcome: Progressing Towards Goal  Goal: *Prevention of pressure injury  Description  Document Mark Scale and appropriate interventions in the flowsheet.   Outcome: Progressing Towards Goal  Note:   Pressure Injury Interventions:  Sensory Interventions: Assess changes in LOC, Float heels, Keep linens dry and wrinkle-free    Moisture Interventions: Absorbent underpads, Apply protective barrier, creams and emollients    Activity Interventions: PT/OT evaluation, Pressure redistribution bed/mattress(bed type), Increase time out of bed    Mobility Interventions: Float heels, Pressure redistribution bed/mattress (bed type)    Nutrition Interventions: Offer support with meals,snacks and hydration, Document food/fluid/supplement intake    Friction and Shear Interventions: Lift sheet, Apply protective barrier, creams and emollients

## 2019-09-07 NOTE — PROGRESS NOTES
Problem: Self Care Deficits Care Plan (Adult)  Goal: *Acute Goals and Plan of Care (Insert Text)  Description  FUNCTIONAL STATUS PRIOR TO ADMISSION: Patient was indepenent at home without DME per his report and confirmed by his neighbor. The patient completed all his own ADLs and IADLs to include driving and gardening. HOME SUPPORT: The patient lived alone with a supportive community to provide assistance. Per his neighbor who was present during session his family does not live locally but they do check in occasionally. He travels to [place] to live with his daughter starting in December until late March. Occupational Therapy Goals  Initiated 9/5/2019  1. Patient will perform lower body ADLs with AE supervision/set-up within 4 day(s). 2.  Patient will perform upper body ADLs standing 5 mins without fatigue or LOB with supervision/set-up within 4 day(s). 3.  Patient will perform toilet transfer with supervision/set-up within 4 day(s). 4.  Patient will perform all aspects of toileting with supervision/set-up within 4 day(s). 5.  Patient will participate in upper extremity therapeutic exercise/activities with supervision/set-up for 10 minutes within 4 day(s). 6.  Patient will utilize energy conservation techniques during functional activities without cues within 4 day(s). Outcome: Progressing Towards Goal    OCCUPATIONAL THERAPY TREATMENT  Patient: Samantha Baez (81 y.o. male)  Date: 9/7/2019  Diagnosis: Fracture of unspecified part of neck of left femur, initial encounter for closed fracture ([facility] [S72.002A] <principal problem not specified>  Procedure(s) (LRB):  LEFT HIP HEMIARTHROPLASTY (Left) 3 Days Post-Op  Precautions: Fall, WBAT  Chart, occupational therapy assessment, plan of care, and goals were reviewed.     ASSESSMENT  Based on the objective data described below, patient continues with decreased independence with self care and functional mobility following admission for fall with left hip fracture. This date he has increased confusion and poor problem solving skills. Pt with fall overnight but x-ray negative for fractures. Pt only oriented to self this date and thinking he was at home. This is a cognitive decline from yesterday as well as his baseline which is independent and driving. Pt unsafe to return home in current condition. Recommend discharge to rehab setting to continue to address ADL activities and safety for home. Should pt's cognition continue to decline, he may benefit from neuro consult as pt has had 2 falls within the last week. Current Level of Function Impacting Discharge (ADLs): total A for grooming correctly    Other factors to consider for discharge: Decline in cognitive statue         PLAN :  Patient continues to benefit from skilled intervention to address the above impairments. Continue treatment per established plan of care. to address goals. Recommend with staff: OOB to chair with chair alarm, he may need to move closer to nurses station     Recommend next OT session: OOB to chair for grooming/bathing activities. Recommendation for discharge: (in order for the patient to meet his/her long term goals)  Therapy 3 hours per day 5-7 days per week    This discharge recommendation:  Has not yet been discussed the attending provider and/or case management    Equipment recommendations for successful discharge (if) home: to be determined by rehab facility       SUBJECTIVE:   Patient stated I need my razor. It is in the other bathroom.   Pt stating this to therapist during shaving activities thinking he is at home.      OBJECTIVE DATA SUMMARY:   Cognitive/Behavioral Status:  Neurologic State: Alert;Confused  Orientation Level: Oriented to person  Cognition: Impaired decision making;Decreased command following;Decreased attention/concentration  Perception: Appears intact  Perseveration: No perseveration noted  Safety/Judgement: Decreased awareness of environment;Decreased awareness of need for assistance;Decreased awareness of need for safety;Decreased insight into deficits    Functional Mobility and Transfers for ADLs:  Bed Mobility:  Rolling: Minimum assistance;Assist x2  Supine to Sit: (recieved sitting in chair)  Sit to Supine: Moderate assistance;Assist x2  Scooting: Minimum assistance    Transfers:  Sit to Stand: Minimum assistance  Functional Transfers  Bathroom Mobility: (unable to progress to bathroom at this time)  Bed to Chair: Minimum assistance    Balance:  Sitting: Intact  Standing: Impaired  Standing - Static: Fair  Standing - Dynamic : Fair;Constant support    ADL Intervention:   Pt received in chair and initiated grooming activities. Provided complete setup for shaving in the chair but he was unable to complete tasks. He did use shaving cream but noted with applying this all over his face and not just facial hair region. Pt given assistance to wash shaving cream off. He attempted shaving after but reported the razors were pulling to much and he could not complete shaving at this time. Aborted activity. Pt then assisted back to bed with help from PT for mobilization. Pt not safe or appropriate for toileting in bathroom at this time. Recommend [facility] use for home. Grooming  Grooming Assistance: Moderate assistance(attempted grooming but did not complete correctly)  Shaving: Total assistance (dependent)(applied shaving cream to face like soap)  Brushing/Combing Hair: Supervision                             Cognitive Retraining  Safety/Judgement: Decreased awareness of environment;Decreased awareness of need for assistance;Decreased awareness of need for safety;Decreased insight into deficits    Pain:  No pain reported    Activity Tolerance:   Fair  Please refer to the flowsheet for vital signs taken during this treatment.     After treatment patient left in no apparent distress:   Supine in bed, Heels elevated for pressure relief, Call bell within reach, Bed / chair alarm activated and Side rails x 3    COMMUNICATION/COLLABORATION:   The patients plan of care was discussed with: Physical Therapist and Registered Nurse    Ana Laura Jimenes OT  Time Calculation: 55 mins

## 2019-09-07 NOTE — PROGRESS NOTES
Bedside and Verbal shift change report given to Wing RN (oncoming nurse) by Julian Navarro RN (offgoing nurse). Report included the following information SBAR, Kardex and MAR.

## 2019-09-07 NOTE — PROGRESS NOTES
Hospitalist Progress Note  Vianney Canales NP  Answering service: [phone] from in house phone  Cell: [phone]   Date of Service:  2019  NAME:  Tasia Tucker  :  3/18/1927  MRN:  [id number]    Admission Summary:   Pt was brought into the ED by EMS for hip pain. He reported he was walking on uneven ground and fell, subsequently unable to standup. Denied chest pain or dizziness prior to the fall. He apparently walks without any assisted device and able to climb stairs in a 3 story buidling. Interval history / Subjective:   Pt in bed, assisted with boost up in bed so he may eat his breakfast. No complaints of pain. Assessment & Plan:     Fall:  - tried to get OOB overnight and fell  - Xray on unit for L hip    Hypertension:  - on no medications PTA, metoprol started yesterday (first dose last night and received another dose already this am. BP not optimized, will start low dose norvasc. L Hip Fracture:   -  X-ray showed left femoral neck fracture. - s/p Left hip hemiarthroplasty 2019  - has prn tylenol ordered, does not appear he has had any in past several days  - continue with bowel regimen  - DVT ppx as per Ortho: on lovenox  - PT/OT  - plan is for Acute Rehab    Hx coronary artery disease with valvular replacement:  - no any acute cardiac issues that needs intervention  - EKG, which showed first-degree AV block  - cardiology evaluated before surgery  - pt 325 mg ASA at home  - SR with frequent PVCs on tele. Check labs in am: BMP/Mg    Underweight:   - BMI 19.9 with this appearance  - nutrition has seen, pt on oral supplements for additional calories    Code status: Full  DVT prophylaxis: lovenox  Care Plan discussed with: patient, nurse  Disposition: Inpatient Rehab.  [facility] reviewing chart     Hospital Problems  Date Reviewed: 2015          Codes Class Noted POA    Fracture of unspecified part of neck of left femur, initial encounter for closed fracture [facility]) ICD-10-CM: A92.027T  ICD-9-CM: 820.8  9/3/2019 Unknown            Review of Systems:   Denies HA. No chest pain, pressure. No shortness of breath. No GI or urinary complaints. No left hip pain at rest.     Vital Signs:    Last 24hrs VS reviewed since prior progress note. Most recent are:  Visit Vitals  BP (!) 182/93   Pulse 83   Temp 98.6 °F (37 °C)   Resp 16   Ht 5' 10\" (1.778 m)   Wt 63.2 kg (139 lb 5.3 oz)   SpO2 93%   BMI 19.99 kg/m²       Intake/Output Summary (Last 24 hours) at 9/7/2019 0852  Last data filed at 9/7/2019 0544  Gross per 24 hour   Intake    Output 1125 ml   Net -1125 ml      Physical Examination:         Constitutional:  No acute distress, cooperative, pleasant    ENT:  Oral MM moist, oropharynx benign. Resp:  Fine crackles in left base, clear with cough. No accessory muscle use and on RA   CV:  Regular rhythm, normal rate. SR with PVCs (frequent)    GI:  Soft, non distended, non tender. Normoactive bowel sounds    Musculoskeletal:  No edema, warm, 2+ pulses throughout    Neurologic:  Moves all extremities. AAOx2. Follows all commands. Psych: Calm and cooperative     Data Review:   Review and/or order of clinical lab test  Review and/or order of tests in the medicine section of [facility]    Labs:     Recent Labs     09/06/19 0233 09/05/19  0235   HGB 10.3* 10.3*     Recent Labs     09/05/19  0235      K 4.5   *   CO2 24   BUN 38*   CREA 1.43*   *   CA 8.4*     No results for input(s): SGOT, GPT, ALT, AP, TBIL, TBILI, TP, ALB, GLOB, GGT, AML, LPSE in the last 72 hours. No lab exists for component: AMYP, HLPSE  No results for input(s): INR, PTP, APTT in the last 72 hours. No lab exists for component: INREXT   No results for input(s): FE, TIBC, PSAT, FERR in the last 72 hours. No results found for: FOL, RBCF   No results for input(s): PH, PCO2, PO2 in the last 72 hours.   No results for input(s): CPK, CKNDX, TROIQ in the last 72 hours.     No lab exists for component: CPKMB  Lab Results   Component Value Date/Time    Cholesterol, total 127 02/19/2010 11:45 AM    HDL Cholesterol 55 02/19/2010 11:45 AM    LDL, calculated 63.6 02/19/2010 11:45 AM    Triglyceride 42 02/19/2010 11:45 AM    CHOL/HDL Ratio 2.3 02/19/2010 11:45 AM     Lab Results   Component Value Date/Time    Glucose (POC) 91 09/04/2019 02:27 PM    Glucose (POC) 94 09/03/2019 03:20 PM     Lab Results   Component Value Date/Time    Color YELLOW/STRAW 09/03/2019 01:21 PM    Appearance CLEAR 09/03/2019 01:21 PM    Specific gravity 1.011 09/03/2019 01:21 PM    pH (UA) 7.5 09/03/2019 01:21 PM    Protein 30 (A) 09/03/2019 01:21 PM    Glucose NEGATIVE  09/03/2019 01:21 PM    Ketone NEGATIVE  09/03/2019 01:21 PM    Bilirubin NEGATIVE  09/03/2019 01:21 PM    Urobilinogen 1.0 09/03/2019 01:21 PM    Nitrites NEGATIVE  09/03/2019 01:21 PM    Leukocyte Esterase NEGATIVE  09/03/2019 01:21 PM    Epithelial cells FEW 09/03/2019 01:21 PM    Bacteria NEGATIVE  09/03/2019 01:21 PM    WBC 0-4 09/03/2019 01:21 PM    RBC 0-5 09/03/2019 01:21 PM     Medications Reviewed:     Current Facility-Administered Medications   Medication Dose Route Frequency    amLODIPine (NORVASC) tablet 2.5 mg  2.5 mg Oral DAILY    enoxaparin (LOVENOX) injection 30 mg  30 mg SubCUTAneous DAILY    metoprolol tartrate (LOPRESSOR) tablet 50 mg  50 mg Oral BID    0.9% sodium chloride infusion  75 mL/hr IntraVENous CONTINUOUS    sodium chloride (NS) flush 5-40 mL  5-40 mL IntraVENous Q8H    sodium chloride (NS) flush 5-40 mL  5-40 mL IntraVENous PRN    naloxone (NARCAN) injection 0.4 mg  0.4 mg IntraVENous PRN    calcium-vitamin D (OS-OLEKSANDR) 500 mg-200 unit tablet  1 Tab Oral TID WITH MEALS    senna-docusate (PERICOLACE) 8.6-50 mg per tablet 1 Tab  1 Tab Oral BID    polyethylene glycol (MIRALAX) packet 17 g  17 g Oral DAILY    bisacodyl (DULCOLAX) suppository 10 mg  10 mg Rectal DAILY PRN    traMADol (ULTRAM) tablet 50 mg  50 mg Oral Q6H PRN    oxyCODONE IR (ROXICODONE) tablet 2.5 mg  2.5 mg Oral Q4H PRN    lactated Ringers infusion 500 mL  500 mL IntraVENous CONTINUOUS    docusate sodium (COLACE) capsule 100 mg  100 mg Oral BID PRN    nitroglycerin (NITROSTAT) tablet 0.4 mg  0.4 mg SubLINGual Q5MIN PRN    sodium chloride (NS) flush 5-40 mL  5-40 mL IntraVENous Q8H    sodium chloride (NS) flush 5-40 mL  5-40 mL IntraVENous PRN    naloxone (NARCAN) injection 0.4 mg  0.4 mg IntraVENous PRN    morphine injection 2 mg  2 mg IntraVENous Q2H PRN    hydrALAZINE (APRESOLINE) 20 mg/mL injection 10 mg  10 mg IntraVENous Q6H PRN    acetaminophen (TYLENOL) tablet 500 mg  500 mg Oral Q6H PRN   _____________________________________________________________________  EXPECTED LENGTH OF STAY: 2d 21h  ACTUAL LENGTH OF STAY:          4               Carlos Diamond NP

## 2019-09-07 NOTE — PROGRESS NOTES
Bedside shift change report given to Mina Shields RN (oncoming nurse) by Elizabeth Aranda RN (offgoing nurse). Report included the following information SBAR, Kardex, Intake/Output, MAR and Recent Results.

## 2019-09-07 NOTE — PROGRESS NOTES
PHYSICAL THERAPY TREATMENT  Patient: Johanna Bright (32 y.o. male)  Date: 9/7/2019  Diagnosis: Fracture of unspecified part of neck of left femur, initial encounter for closed fracture ([email or web address] [S72.002A] <principal problem not specified>  Procedure(s) (LRB):  LEFT HIP HEMIARTHROPLASTY (Left) 3 Days Post-Op  Precautions: Fall, WBAT  Chart, physical therapy assessment, plan of care and goals were reviewed. ASSESSMENT  Based on the objective data described below, patient was received with OT/OT student present as they were finishing their session with patient in chair. Following their session, patient requested going back to bed. OT student and PT co-treated in getting patient back to bed. He appeared to be more confused than the AM session, as he did not remember the PT working with him just an hour before, and he did not know where he was. He needed verbal cueing for sequencing. He was able to follow commands for sequencing, gait and posture. Will continue to progress as able. Current Level of Function Impacting Discharge (mobility/balance): Mod A for bed mobility, frequent verbal cueing for sequencing and gait, min A for gait. Other factors to consider for discharge: Lives alone. Intermittent confusion following surgery. PLAN :  Patient continues to benefit from skilled intervention to address the above impairments. Continue treatment per established plan of care. to address goals. Recommendation for discharge: (in order for the patient to meet his/her long term goals)  Therapy 3 hours per day 5-7 days per week    This discharge recommendation:  Has been made in collaboration with the attending provider and/or case management    Equipment recommendations for successful discharge (if) home:         SUBJECTIVE:   Patient stated I am a little sore. Have we met somewhere before?     OBJECTIVE DATA SUMMARY:   Critical Behavior:  Neurologic State: Alert, Confused  Orientation Level: Oriented to person  Cognition: Impulsive, Decreased attention/concentration  Safety/Judgement: Decreased awareness of environment, Decreased awareness of need for assistance, Decreased awareness of need for safety  Functional Mobility Training:  Bed Mobility:  Rolling: Minimum assistance;Assist x2  Supine to Sit: Moderate assistance;Assist x2  Sit to Supine: Moderate assistance;Assist x2  Scooting: Contact guard assistance        Transfers:  Sit to Stand: Minimum assistance  Stand to Sit: Minimum assistance          Balance:  Sitting: Intact  Standing: Impaired  Standing - Static: Fair  Standing - Dynamic : Fair;Constant support  Ambulation/Gait Training:  Distance (ft): 5 Feet (ft)  Assistive Device: Walker, rolling;Gait belt  Ambulation - Level of Assistance: Minimal assistance;Assist x2     Gait Description (WDL): Exceptions to WDL  Gait Abnormalities: Antalgic;Decreased step clearance; Step to gait        Base of Support: Shift to right  Stance: Left decreased  Speed/Milka: Slow;Pace decreased (<100 feet/min)  Step Length: Right shortened;Left shortened  Swing Pattern: Left asymmetrical       Pain Ratin/10. Activity Tolerance:   Fair  Please refer to the flowsheet for vital signs taken during this treatment.     After treatment patient left in no apparent distress:   Supine in bed, Call bell within reach and Bed / chair alarm activated    COMMUNICATION/COLLABORATION:   The patients plan of care was discussed with: Registered Nurse    Jesus Joshi, PT   Time Calculation: 20 mins

## 2019-09-08 NOTE — PROGRESS NOTES
Bedside and Verbal shift change report given to Ellen (oncoming nurse) by Neli Hernandez (offgoing nurse). Report included the following information SBAR, Kardex, Intake/Output, MAR and Recent Results.    t

## 2019-09-08 NOTE — PROGRESS NOTES
Problem: Mobility Impaired (Adult and Pediatric)  Goal: *Acute Goals and Plan of Care (Insert Text)  Description  FUNCTIONAL STATUS PRIOR TO ADMISSION: Patient was independent and active without use of DME. HHOME SUPPORT PRIOR TO ADMISSION: The patient lived alone with no local support. Physical Therapy Goals  Initiated 9/5/2019    1. Patient will move from supine to sit and sit to supine  in bed with minimal assistance/contact guard assist within 4 days. 2. Patient will perform sit to stand with minimal assistance/contact guard assist within 4 days. 3. Patient will ambulate with minimal assistance/contact guard assist for 25 feet with the least restrictive device within 4 days. 4. Patient will perform hip home exercise program with minimal assistance/contact guard assist within 4 days. Outcome: Progressing Towards Goal   PHYSICAL THERAPY TREATMENT  Patient: Geeta Soto (66 y.o. male)  Date: 9/8/2019  Diagnosis: Fracture of unspecified part of neck of left femur, initial encounter for closed fracture ([facility] [address] [S72.002A] <principal problem not specified>  Procedure(s) (LRB):  LEFT HIP HEMIARTHROPLASTY (Left) 4 Days Post-Op  Precautions: Fall, WBAT  Chart, physical therapy assessment, plan of care and goals were reviewed. ASSESSMENT  Based on the objective data described below, pt is slowly improving with his mobility, tolerating ambulating 45' x 2 with a rolling walker, WBAT LLE. Gait is slow, stable with use of the walker and minimum assist x 1. Pt needs verbal cues for safety, and verbal directions and encouragement due to his general confusion and disorientation. Will plan to see again today for further gait training.     Current Level of Function Impacting Discharge (mobility/balance): decreased strength and balance,      Other factors to consider for discharge: pt lives alone, pt's disorientation and confusion, decreased safety awareness         PLAN :  Patient continues to benefit from skilled intervention to address the above impairments. Continue treatment per established plan of care. to address goals. Recommendation for discharge: (in order for the patient to meet his/her long term goals)  Therapy 3 hours per day 5-7 days per week    This discharge recommendation:  A follow-up discussion with the attending provider and/or case management is planned    Equipment recommendations for successful discharge (if) home: to be determined by rehab facility       SUBJECTIVE:   Patient pleasant and alert but disoriented x3. Cooperative with PT; some confusion with commands/directions. No complaints of pain. OBJECTIVE DATA SUMMARY:   Critical Behavior:  Neurologic State: Alert, Confused  Orientation Level: Oriented to person  Cognition: Decreased attention/concentration, Decreased command following, Impaired decision making  Safety/Judgement: Decreased awareness of environment, Decreased awareness of need for assistance, Decreased awareness of need for safety, Decreased insight into deficits  Functional Mobility Training:  Bed Mobility:     Supine to Sit: Moderate assistance;Assist x2  Sit to Supine: (sat up in chair)           Transfers:  Sit to Stand: Moderate assistance;Assist x1  Stand to Sit: Minimum assistance;Assist x1                             Balance:  Sitting: Intact  Standing: Impaired  Standing - Static: Fair  Standing - Dynamic : Fair  Ambulation/Gait Training:  Distance (ft): 90 Feet (ft)  Assistive Device: Gait belt;Walker, rolling  Ambulation - Level of Assistance: Minimal assistance;Assist x2        Gait Abnormalities: Decreased step clearance; Antalgic  Right Side Weight Bearing: Full  Left Side Weight Bearing: As tolerated  Base of Support: Narrowed     Speed/Milka: Slow;Fluctuations  Step Length: Right shortened;Left shortened                                 Therapeutic Exercises:   SUPINE  EXERCISES   Sets   Reps   Active Active Assist   Passive Self ROM   Comments   Ankle Pumps 1 10 ?                                           ?                                           ?                                           ?                                              Quad Sets 1 10 ?                                           ?                                           ?                                           ?                                              Heel Slides 1 10 ?                                           ?                                           ?                                           ?                                              Hip Abduction 1 10 ?                                           ?                                           ?                                           ?                                              Hip External Rotation   ? ?                                           ?                                           ?                                              Glut Sets   ? ?                                           ?                                           ?                                                 ?                                           ?                                           ?                                           ?                                                 ?                                           ?                                           ?                                           ?                                                  Pain Ratin    Activity Tolerance:   Good- tolerated ambulating 45' x 2 with rolling walker, sitting up in chair. Please refer to the flowsheet for vital signs taken during this treatment.     After treatment patient left in no apparent distress:   Sitting in chair, Call bell within reach and Bed / chair alarm activated    COMMUNICATION/COLLABORATION:   The patients plan of care was discussed with: Registered Nurse    Nafisa Peck, PT   Time Calculation: 25 mins

## 2019-09-08 NOTE — PROGRESS NOTES
Problem: Mobility Impaired (Adult and Pediatric)  Goal: *Acute Goals and Plan of Care (Insert Text)  Description  FUNCTIONAL STATUS PRIOR TO ADMISSION: Patient was independent and active without use of DME. HHOME SUPPORT PRIOR TO ADMISSION: The patient lived alone with no local support. Physical Therapy Goals  Initiated 9/5/2019    1. Patient will move from supine to sit and sit to supine  in bed with minimal assistance/contact guard assist within 4 days. 2. Patient will perform sit to stand with minimal assistance/contact guard assist within 4 days. 3. Patient will ambulate with minimal assistance/contact guard assist for 25 feet with the least restrictive device within 4 days. 4. Patient will perform hip home exercise program with minimal assistance/contact guard assist within 4 days. 9/8/2019 1439 by Dora Estes, PT  Outcome: Progressing Towards Goal  9/8/2019 1316 by Dora Estes, PT  Outcome: Progressing Towards Goal   PHYSICAL THERAPY TREATMENT  Patient: Dakota Patel (31 y.o. male)  Date: 9/8/2019  Diagnosis: Fracture of unspecified part of neck of left femur, initial encounter for closed fracture ([facility] [address] [S72.002A] <principal problem not specified>  Procedure(s) (LRB):  LEFT HIP HEMIARTHROPLASTY (Left) 4 Days Post-Op  Precautions: Fall, WBAT  Chart, physical therapy assessment, plan of care and goals were reviewed. ASSESSMENT  Based on the objective data described below, pt continuing to improve with his mobility this afternoon, tolerating ambulating 150' with a rolling walker, WBAT LLE. Gait was stable; verbal cueing for directions and to try and walk up into the walker more. Pt fatigued at end of walk and was assisted back into bed. Pt still has confusion but appeared to be a bit better this afternoon. Nurse and NP aware of pt's activity today. Will plan to continue PT tomorrow for transfers and gait training, orientation awareness.                  PLAN :  Patient continues to benefit from skilled intervention to address the above impairments. Continue treatment per established plan of care. to address goals. Recommendation for discharge: (in order for the patient to meet his/her long term goals)  Therapy 3 hours per day 5-7 days per week    This discharge recommendation: inpt rehab - NP aware             SUBJECTIVE:   Patient sitting up in chair, just finished eating lunch. No complaints of pain at rest. Could not recall where he was and why he was in the hospital or which hip/leg was injured. Upon standing he stated \"ouch\" and when asked which leg hurt he was able to identify the correct on. Following PT session pt was able to tell PT that he was in the hospital and was able to recall which one, and what city it was located. He was not able to recall which day of the week it was. Nurse Practitioner in to assess pt following PT session. OBJECTIVE DATA SUMMARY:   Critical Behavior:  Neurologic State: Alert, Confused  Orientation Level: Oriented to person  Cognition: Decreased attention/concentration, Decreased command following, Impaired decision making  Safety/Judgement: Decreased awareness of environment, Decreased awareness of need for assistance, Decreased awareness of need for safety, Decreased insight into deficits  Functional Mobility Training:  Bed Mobility:     Supine to Sit: Moderate assistance;Assist x2  Sit to Supine: Minimum assistance;Assist x1           Transfers:  Sit to Stand: Minimum assistance;Assist x2  Stand to Sit: Minimum assistance;Assist x1                             Balance:  Sitting: Intact  Standing: Impaired  Standing - Static: Fair  Standing - Dynamic : Fair  Ambulation/Gait Training:  Distance (ft): 150 Feet (ft)  Assistive Device: Gait belt;Walker, rolling  Ambulation - Level of Assistance: Minimal assistance;Assist x2        Gait Abnormalities: Decreased step clearance; Antalgic  Right Side Weight Bearing: Full  Left Side Weight Bearing: As tolerated  Base of Support: Narrowed     Speed/Milka: Slow;Fluctuations  Step Length: Right shortened;Left shortened                                 Pain Ratin    Activity Tolerance:   Good - tolerated ambulating 150' with rolling walker, WBAT LLE  Please refer to the flowsheet for vital signs taken during this treatment.     After treatment patient left in no apparent distress:   Supine in bed, Call bell within reach, Bed / chair alarm activated and Side rails x 3    COMMUNICATION/COLLABORATION:   The patients plan of care was discussed with: Registered Nurse, JEFF Mendoza, PT   Time Calculation: 15 mins

## 2019-09-08 NOTE — PROGRESS NOTES
Lovenox Monitoring  Indication: DVT Prophylaxis  Recent Labs     09/08/19  0153 09/06/19  0233   HGB  --  10.3*   CREA 1.09  --      Current Weight: 63.2 kg  Est. CrCl = 38 ml/min  Current Dose: 30 mg subcutaneously every 24 hours. Plan: Change to 40mg SC every 24 hours per [facility] P&T Committee Protocol with respect to renal function. Pharmacy will continue to monitor patient daily and will make dosage adjustments based upon changing renal function.     Cipriano Escalante, PharmD, BCPP, [facility] Specialist, [facility]

## 2019-09-08 NOTE — ROUTINE PROCESS
Bedside shift change report given to Alessandra Gómez RN (oncoming nurse) by Deepika Adams RN(offgoing nurse). Report given with SBAR.

## 2019-09-09 PROBLEM — I10 HYPERTENSION: Status: ACTIVE | Noted: 2019-01-01
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
## 2019-09-09 NOTE — PROGRESS NOTES
CM received a call from [facility] ([phone]), pt's daughter, to discuss choice of SNF's. She would like referrals to go to [facility], [facility] and [facility]. CM sent referrals to all of these facilities. CM received another call from [facility] again. She now wants [facility] and per Leonie, she can accept this pt pending getting auth. Will follow.  Chang Hare

## 2019-09-09 NOTE — PROGRESS NOTES
JYOTI- Discharge to [facility] today. SEAN spoke with Bandar Morris for [facility]. He stated that her physician has accepted this pt and they have auth from his insurance company. He can accept this pt today. CM met with pt to inform him and hs is in agreement. SEAN also spoke with pt's daughter, Joy Osgood, to inform her of above and she is in agreement as well. SEAN set up ambulance with [facility] for 3pm today. An envelope containing pt's kardex, MAR, AVS and emtala will be sent with pt to [facility]. Also, pt's nurse will call report.  Keenan Kohler

## 2019-09-09 NOTE — DISCHARGE INSTRUCTIONS
Partial Hip Replacement  Post-op Discharge Instructions  Tj Shelton, [address] [facility]    Diet  Regular diet or usual diet as at home. Drink plenty of fluids. Eat foods high in fiber and protein and low in fat. Avoid alcoholic beverages. Avoid smoking. Activities  Be as active as possible. Walk with your walker or crutches weight bearing as tolerated. Continue using your walker or crutches until seen for follow-up visit. Avoid low chairs and slippery surfaces. Use elevated toilet seat. Do not sit longer than 45 minutes at a time. During the daytime, get up every hour and take a brief walk. Maintain your hip precautions for 3 months. Do not drive or operate heavy machinery until instructed. Exercises  Perform your exercise routine 3 times a day as instructed by the physical therapist.  Gradually increase the repetitions of the exercises. You may place an ice bag on your hip for 20 minutes after exercising. You should walk daily, each time lengthening your walking distance as your strength improves. Medications  1. Aspirin 325 mg daily. 2. Take a multivitamin with iron or iron supplement once a day for a month. 3. Take pain medication with food. You will find that you will decrease the amount you use as your pain lessens. You may take Tylenol (regular or extra strength) scheduled as ordered ( 500 mg every six hours)  4. You may need to take a laxative or stool softener if you experience constipation. Incision Care  Leave your surgical dressing in place until seven days after surgery. You may shower with this dressing in place (it is waterproof), but you may not submerge your incision in a bathtub, hot tub, or swimming pool. If the dressing comes loose before the seventh day, you may replace it with dry sterile gauze daily. After the surgical dressing is removed, you may get your incision wet in the shower.   Do not submerge your incision under water (bath tub, hot tub, swimming pool, etc) for 6 weeks after surgery. Wear your elastic stockings for 4 weeks. You may remove them for approximately 1 hour when showering/sponge bathing and they can be removed at night 1-2 weeks after surgery. Follow-up Office Visit  See Dr. Pineda andrew approximately 4 weeks from date of surgery. Call [phone] to make your appointment. Reasons to call the Doctor  1. Increased redness, swelling or drainage from you incision site. 2.  Temperature consistently greater than 101 degrees. 3.  Increased pain or unrelieved pain. 4.  Calf or chest pain. Home Health/Skilled Rehab  1. Physical Therapy in facility - routine exercises, transfers, gait training, weight bearing as tolerated on affected extremity. Posterior hip precuations. 2. Nursing - see incision care section above. 3. After discharge home, arrange for home physical therapy visit three times weekly. Hip Precautions-Posterior Hip Arthroplasty (follow for 3 months)  1. Do not cross your legs. 2.  Do not force your operated leg inward. 3.  Do not bend your hip more than 90 degrees (right angle). Hypertension:     You have a new diagnosis of high blood pressure. We have started you on two new medications for your blood pressure control. Metoprolol and Norvasc. You will likely need prescriptions for these at time of discharge from rehab . You will also need to follow up with primary care physician for blood pressure checks. You should continue taking 325 mg aspirin every day as you were previously.     Follow-up Information     Follow up With Specialties Details Why [address] [facility] [address] [address] [place] [address] [facility]    Damian Jason MD Family Practice In 7 days after discharge from rehab  [address] [facility] [address] Dean Valero MD Orthopedic Surgery In 4 weeks post op follow up, please call to schedule this appointment as soon as possible [address] [facility] [address]  [place] [address] [address] [facility]

## 2019-09-09 NOTE — PROGRESS NOTES
Problem: Mobility Impaired (Adult and Pediatric)  Goal: *Acute Goals and Plan of Care (Insert Text)  Description  FUNCTIONAL STATUS PRIOR TO ADMISSION: Patient was independent and active without use of DME. HHOME SUPPORT PRIOR TO ADMISSION: The patient lived alone with no local support. Physical Therapy Goals  Initiated 9/5/2019    1. Patient will move from supine to sit and sit to supine  in bed with minimal assistance/contact guard assist within 4 days. 2. Patient will perform sit to stand with minimal assistance/contact guard assist within 4 days. 3. Patient will ambulate with minimal assistance/contact guard assist for 25 feet with the least restrictive device within 4 days. 4. Patient will perform hip home exercise program with minimal assistance/contact guard assist within 4 days. Outcome: Progressing Towards Goal   PHYSICAL THERAPY TREATMENT  Patient: Carlitos Coolye (13 y.o. male)  Date: 9/9/2019  Diagnosis: Fracture of unspecified part of neck of left femur, initial encounter for closed fracture ([facility] [address] [S72.002A] <principal problem not specified>  Procedure(s) (LRB):  LEFT HIP HEMIARTHROPLASTY (Left) 5 Days Post-Op  Precautions: Fall, WBAT  Chart, physical therapy assessment, plan of care and goals were reviewed. ASSESSMENT  Based on the objective data described below, patient continues to have decreased balance and endurance. Note continued antalgic gait with the walker, though this is somewhat improved when he slows is pace. Continue to recommend rehab and plan is for transfer there today. Current Level of Function Impacting Discharge (mobility/balance): min assist for mobility and gait short distances    Other factors to consider for discharge: none         PLAN :  Patient continues to benefit from skilled intervention to address the above impairments. Continue treatment per established plan of care. to address goals.     Recommendation for discharge: (in order for the patient to meet his/her long term goals)  Therapy 3 hours per day 5-7 days per week    This discharge recommendation:  Has been made in collaboration with the attending provider and/or case management    Equipment recommendations for successful discharge (if) home: to be determined by rehab facility       SUBJECTIVE:   Patient stated Clarence Zarate told me the men were coming to get me.     OBJECTIVE DATA SUMMARY:   Critical Behavior:  Neurologic State: Alert, Appropriate for age  Orientation Level: Oriented to person, Disoriented to situation, Disoriented to place, Disoriented to time  Cognition: Follows commands  Safety/Judgement: Decreased awareness of environment, Decreased awareness of need for assistance, Decreased awareness of need for safety, Decreased insight into deficits  Functional Mobility Training:  Bed Mobility:                    Transfers:  Sit to Stand: Minimum assistance; Adaptive equipment; Additional time  Stand to Sit: Minimum assistance; Adaptive equipment; Additional time                             Balance:  Sitting: Intact; Without support  Standing: Impaired; With support  Standing - Static: Fair  Standing - Dynamic : Fair  Ambulation/Gait Training:  Distance (ft): 125 Feet (ft)(twice, with seated rest)  Assistive Device: Gait belt;Walker, rolling  Ambulation - Level of Assistance: Minimal assistance; Adaptive equipment; Additional time        Gait Abnormalities: Antalgic;Decreased step clearance        Base of Support: Narrowed  Stance: Left decreased(L knee flexion in stance)  Speed/Milka: Pace decreased (<100 feet/min)  Step Length: Left shortened;Right shortened        Interventions: Safety awareness training; Tactile cues; Verbal cues; Visual/Demos           Stairs: Therapeutic Exercises:     Pain Rating:  Minimal pain reported    Activity Tolerance:   Fair and requires rest breaks  Please refer to the flowsheet for vital signs taken during this treatment.     After treatment patient left in no apparent distress:   Sitting in chair, Call bell within reach and Bed / chair alarm activated    COMMUNICATION/COLLABORATION:   The patients plan of care was discussed with: Registered Nurse and     Nel Craig, PT   Time Calculation: 10 mins

## 2019-09-09 NOTE — PROGRESS NOTES
Hospitalist Progress Note  Lynn Caller, JEFF  Answering service: [phone] from in house phone  ITYR:[id number]   Date of Service:  2019  NAME:  Kay Cooper  :  3/18/1927  MRN:  [id number]    Admission Summary:   Pt was brought into the ED by EMS for hip pain. He reported he was walking on uneven ground and fell, subsequently unable to standup. Denied chest pain or dizziness prior to the fall. He apparently walks without any assisted device and able to climb stairs in a 3 story buidling. Interval history / Subjective:   Pt in bed, just worked with PT and now back in bed. Has no pain and is feeling well. He is confused at times. He was supposed to go to [facility] rehab , however they gave us authorization on the wrong patient.  is now working on SNF placement for patient. Assessment & Plan:     Fall:  - tried to get OOB early am  and fell  - Xray with no acute process/displacement  - note that nurse updated family on fall Saturday    Hypertension:  - on no medications PTA, metoprol started yesterday (first dose last night and received another dose already this am)  - BP not optimized, norvasc increased earlier this am and will increase again in am    L Hip Fracture:   -  X-ray showed left femoral neck fracture. - s/p Left hip hemiarthroplasty 2019. POD 3  - has prn tylenol ordered, had dose last pm  - continue with bowel regimen + BM today  - DVT ppx as per Ortho: on lovenox  - PT/OT  - plan is now for SNF, doing well with therapy     Hx coronary artery disease with valvular replacement:  - no any acute cardiac issues that needs intervention  - EKG, which showed first-degree AV block  - cardiology evaluated before surgery  - pt 325 mg ASA at home  - SR with frequent PVCs on tele.  Labs ok    Underweight:   - BMI 19.9 with this appearance  - nutrition has seen, pt on oral supplements for additional calories    Disorientation:   - can be re-oriented, he remembers how he feel consistently - sometimes has trouble remembering where he is. Code status: Full  DVT prophylaxis: lovenox  Care Plan discussed with: patient, nurse and      Disposition: Inpatient Rehab denied. Working on SNF. Daughter updated via telephone. Hopefully can discharge on 9/10      Hospital Problems  Date Reviewed: 7/2/2015          Codes Class Noted POA    Hypertension ICD-10-CM: I10  ICD-9-CM: 401.9  9/9/2019 Unknown        Fall from ground level ICD-10-CM: R41.09BD  ICD-9-CM: E888.9  9/9/2019 Unknown        Fracture of unspecified part of neck of left femur, initial encounter for closed fracture [facility]) ICD-10-CM: U08.500M  ICD-9-CM: 820.8  9/3/2019 Unknown            Review of Systems:   Denies HA. No chest pain, pressure. No shortness of breath. No GI or urinary complaints. No left hip pain. Vital Signs:    Last 24hrs VS reviewed since prior progress note. Most recent are:  Visit Vitals  /73 (BP 1 Location: Right arm)   Pulse 69   Temp 97.9 °F (36.6 °C)   Resp 16   Ht 5' 10\" (1.778 m)   Wt 136 lb 3.2 oz (61.8 kg)   SpO2 96%   BMI 19.54 kg/m²     No intake or output data in the 24 hours ending 09/09/19 1709   Physical Examination:         Constitutional:  No acute distress, cooperative, pleasant    ENT:  Oral MM moist, oropharynx benign. Resp:  Fine crackles in left base, clear with cough. No accessory muscle use and on RA   CV:  Regular rhythm, normal rate. SR with PVCs (frequent)    GI:  Soft, non distended, non tender. Normoactive bowel sounds + BM    Musculoskeletal:  No edema, warm, 2+ pulses throughout    Neurologic:  Moves all extremities. AAOx2. Follows all commands.    Psych: Calm and cooperative     Data Review:   Review and/or order of clinical lab test  Review and/or order of tests in the medicine section of CPT    Labs:     No results for input(s): WBC, HGB, HCT, PLT, HGBEXT, HCTEXT, PLTEXT, HGBEXT, HCTEXT, PLTEXT in the last 72 hours. Recent Labs     09/08/19  0153      K 4.5   *   CO2 27   BUN 37*   CREA 1.09   *   CA 9.7   MG 2.0     No results for input(s): SGOT, GPT, ALT, AP, TBIL, TBILI, TP, ALB, GLOB, GGT, AML, LPSE in the last 72 hours. No lab exists for component: AMYP, HLPSE  No results for input(s): INR, PTP, APTT in the last 72 hours. No lab exists for component: INREXT, INREXT   No results for input(s): FE, TIBC, PSAT, FERR in the last 72 hours. No results found for: FOL, RBCF   No results for input(s): PH, PCO2, PO2 in the last 72 hours. No results for input(s): CPK, CKNDX, TROIQ in the last 72 hours.     No lab exists for component: CPKMB  Lab Results   Component Value Date/Time    Cholesterol, total 127 02/19/2010 11:45 AM    HDL Cholesterol 55 02/19/2010 11:45 AM    LDL, calculated 63.6 02/19/2010 11:45 AM    Triglyceride 42 02/19/2010 11:45 AM    CHOL/HDL Ratio 2.3 02/19/2010 11:45 AM     Lab Results   Component Value Date/Time    Glucose (POC) 91 09/04/2019 02:27 PM    Glucose (POC) 94 09/03/2019 03:20 PM     Lab Results   Component Value Date/Time    Color YELLOW/STRAW 09/03/2019 01:21 PM    Appearance CLEAR 09/03/2019 01:21 PM    Specific gravity 1.011 09/03/2019 01:21 PM    pH (UA) 7.5 09/03/2019 01:21 PM    Protein 30 (A) 09/03/2019 01:21 PM    Glucose NEGATIVE  09/03/2019 01:21 PM    Ketone NEGATIVE  09/03/2019 01:21 PM    Bilirubin NEGATIVE  09/03/2019 01:21 PM    Urobilinogen 1.0 09/03/2019 01:21 PM    Nitrites NEGATIVE  09/03/2019 01:21 PM    Leukocyte Esterase NEGATIVE  09/03/2019 01:21 PM    Epithelial cells FEW 09/03/2019 01:21 PM    Bacteria NEGATIVE  09/03/2019 01:21 PM    WBC 0-4 09/03/2019 01:21 PM    RBC 0-5 09/03/2019 01:21 PM     Medications Reviewed:     Current Facility-Administered Medications   Medication Dose Route Frequency    amLODIPine (NORVASC) tablet 10 mg  10 mg Oral DAILY    enoxaparin (LOVENOX) injection 40 mg  40 mg SubCUTAneous DAILY    metoprolol tartrate (LOPRESSOR) tablet 50 mg  50 mg Oral BID    sodium chloride (NS) flush 5-40 mL  5-40 mL IntraVENous Q8H    sodium chloride (NS) flush 5-40 mL  5-40 mL IntraVENous PRN    naloxone (NARCAN) injection 0.4 mg  0.4 mg IntraVENous PRN    calcium-vitamin D (OS-OLEKSANDR) 500 mg-200 unit tablet  1 Tab Oral TID WITH MEALS    senna-docusate (PERICOLACE) 8.6-50 mg per tablet 1 Tab  1 Tab Oral BID    polyethylene glycol (MIRALAX) packet 17 g  17 g Oral DAILY    bisacodyl (DULCOLAX) suppository 10 mg  10 mg Rectal DAILY PRN    traMADol (ULTRAM) tablet 50 mg  50 mg Oral Q6H PRN    docusate sodium (COLACE) capsule 100 mg  100 mg Oral BID PRN    nitroglycerin (NITROSTAT) tablet 0.4 mg  0.4 mg SubLINGual Q5MIN PRN    sodium chloride (NS) flush 5-40 mL  5-40 mL IntraVENous Q8H    sodium chloride (NS) flush 5-40 mL  5-40 mL IntraVENous PRN    naloxone (NARCAN) injection 0.4 mg  0.4 mg IntraVENous PRN    hydrALAZINE (APRESOLINE) 20 mg/mL injection 10 mg  10 mg IntraVENous Q6H PRN    acetaminophen (TYLENOL) tablet 500 mg  500 mg Oral Q6H PRN   _____________________________________________________________________  EXPECTED LENGTH OF STAY: 2d 21h  ACTUAL LENGTH OF STAY:          6               Bhupendra King NP

## 2019-09-09 NOTE — PROGRESS NOTES
CM was approached by [facility] from [facility] now informing this CM that pt was denied by his insurance, not approved. He stated that this was a misunderstanding from this morning. CM informed NP Jeanmarie Dawkins as well as cancelled the ambulance. [facility] from [facility] called pt's daughter to explain it to her. She is now wanting SNF rehab for pt. She will give this CM her choices a little later today. Oskar Fontaine    CM notes patient is a [employer].

## 2019-09-09 NOTE — PROGRESS NOTES
Problem: Self Care Deficits Care Plan (Adult)  Goal: *Acute Goals and Plan of Care (Insert Text)  Description  FUNCTIONAL STATUS PRIOR TO ADMISSION: Patient was indepenent at home without DME per his report and confirmed by his neighbor. The patient completed all his own ADLs and IADLs to include driving and gardening. HOME SUPPORT: The patient lived alone with a supportive community to provide assistance. Per his neighbor who was present during session his family does not live locally but they do check in occasionally. He travels to [place] to live with his daughter starting in December until late March. Occupational Therapy Goals  Initiated 9/5/2019. Reviewed 9/9/19 and no goals met at this time. All remain appropriate to be met over the next 4 days. 1.  Patient will perform lower body ADLs with AE supervision/set-up within 4 day(s). 2.  Patient will perform upper body ADLs standing 5 mins without fatigue or LOB with supervision/set-up within 4 day(s). 3.  Patient will perform toilet transfer with supervision/set-up within 4 day(s). 4.  Patient will perform all aspects of toileting with supervision/set-up within 4 day(s). 5.  Patient will participate in upper extremity therapeutic exercise/activities with supervision/set-up for 10 minutes within 4 day(s). 6.  Patient will utilize energy conservation techniques during functional activities without cues within 4 day(s). Outcome: Progressing Towards Goal    OCCUPATIONAL THERAPY TREATMENT  Patient: Kate Jara (6 y.o. male)  Date: 9/9/2019  Diagnosis: Fracture of unspecified part of neck of left femur, [facility] [S72.002A] <principal problem not specified>  Procedure(s) (LRB):  LEFT HIP HEMIARTHROPLASTY (Left) 5 Days Post-Op  Precautions: Fall, WBAT  Chart, occupational therapy assessment, plan of care, and goals were reviewed.     ASSESSMENT  Based on the objective data described below, pt remains cognitively impaired with poor safety awareness, decreased attention to task and impaired balance in standing, endurance and mobility POD 5 L hip hemiarthroplasty. Current Level of Function Impacting Discharge (ADLs): Mod A for LE ADLs, max A for toileting and min A for functional mobility using RW to amb    Other factors to consider for discharge: pt lives alone and does not have assist available         PLAN :  Patient continues to benefit from skilled intervention to address the above impairments. Continue treatment per established plan of care. to address goals. Recommend with staff: Recommend with nursing patient to complete as able in order to maintain strength, endurance and independence: ADLs with mod A/setup, OOB to chair 3x/day and mobilizing to the bathroom for toileting with min assist and RW. Thank you for your assistance. Recommend next OT session: ADLs seated and standing, safety awareness and command following    Recommendation for discharge: (in order for the patient to meet his/her long term goals)  Therapy 3 hours per day 5-7 days per week    This discharge recommendation:  Has been made in collaboration with the attending provider and/or case management    Equipment recommendations for successful discharge (if) home: to be determined by rehab facility       SUBJECTIVE:   Patient stated I have been trying to call my wife all day, but she won't answer. - reoriented pt to present and environment    OBJECTIVE DATA SUMMARY:   Cognitive/Behavioral Status:  Neurologic State: Alert;Confused  Orientation Level: Oriented to person;Disoriented to place; Disoriented to situation;Disoriented to time  Cognition: Decreased attention/concentration; Follows commands;Poor safety awareness  Perception: Appears intact  Perseveration: Perseverates during conversation  Safety/Judgement: Decreased awareness of environment;Decreased awareness of need for assistance;Decreased awareness of need for safety;Decreased insight into deficits; Fall prevention    Functional Mobility and Transfers for ADLs:  Bed Mobility:  Supine to Sit: Additional time;Assist x1; Moderate assistance(A for upperbody)  Scooting: Contact guard assistance; Additional time;Assist x1    Transfers:  Sit to Stand: Minimum assistance; Additional time;Assist x1(LOB posteriorly)          Balance:  Sitting: Intact; Without support  Standing: Impaired; With support(RW)  Standing - Static: Fair  Standing - Dynamic : Fair    ADL Intervention:  Lower Body Dressing Assistance  Dressing Assistance: Moderate assistance(pt unable to use AE due to imp cognition)  Protective Undergarmet: Moderate assistance(to manage diaper like underpants- LOB posteriorly)  Socks: Moderate assistance(A to thread R sock over toes, total A for L sock)  Leg Crossed Method Used: No  Position Performed: Seated in chair;Standing  Cues: Don;Doff;Physical assistance; Tactile cues provided;Verbal cues provided;Visual cues provided  Adaptive Equipment Used: Walker    Cognitive Retraining  Orientation Retraining: Reorienting  Problem Solving: Awareness of environment; Identifying the task  Executive Functions: Executing cognitive plans  Organizing/Sequencing: Breaking task down  Attention to Task: Single task  Following Commands: Follows one step commands/directions  Safety/Judgement: Decreased awareness of environment;Decreased awareness of need for assistance;Decreased awareness of need for safety;Decreased insight into deficits; Fall prevention  Cues: Tactile cues provided;Verbal cues provided;Visual cues provided    Patient could not recalled and demonstrated avoiding extreme planes of movement with Left LE during ADLs and functional mobility with max cues. Bathing: Patient instructed when bathing to not submerge wound in water, stand to shower or sponge bathe, cover wound with plastic and tape to ensure no water reaches bandage/wound without cues. Patient indicated understanding.   Dressing joint: Patient instructed and demonstrated to don/doff Left LE first/last verbal cues. Patient instructed and demonstrated to don all clothing while sitting prior to standing, doff all clothing to knees while standing, then sit to doff clothing off from knees to feet in order to facilitate fall prevention, pain management, and energy conservation with Maximum assistance. Home safety: Patient instructed on home modifications and safety (raise height of ADL objects, appropriate height of chair surfaces, recliner safety, change of floor surfaces, clear pathways) to increase independence and fall prevention. Patient indicated understanding. Standing: Patient instructed and demonstrated to walk up to sink/counter top/surfaces, step into walker to increase safety of joint and fall prevention with Maximum assistance. Patient educated about hip anatomy verbally and educated to avoid internal rotation of Left LE. Instructed to apply concept to ADLs within the home (no reaching across body to Left side, square off while using objects, slide objects along surfaces). Patient instructed to increase amount of time standing, observe standing position during ADLs in order to increase even weight bearing through bilateral LEs in order to increase independence with ADLs. Goal to be reached 30 days post - op, per orthopedic surgeon or per PT. Patient indicated understanding. Tub transfer: Patient instructed regarding when it is safe to begin transfer into tub (complete stairs with PT, advance exercises with PT high enough to clear tub height). Patient instructed to use the same technique as used with stairs when entering and exiting tub (\"up with the non-surgical, down with the surgical leg\"). Patient indicated understanding.       Pain:  Pt with min facial grimacing with mobility, but no number given    Activity Tolerance:   Fair and requires rest breaks  Please refer to the flowsheet for vital signs taken during this treatment.     After treatment patient left in no apparent distress:   Sitting in chair, Call bell within reach and Bed / chair alarm activated    COMMUNICATION/COLLABORATION:   The patients plan of care was discussed with: Physical Therapist, Registered Nurse and     Lizett Campbell OT  Time Calculation: 27 mins

## 2019-09-09 NOTE — PROGRESS NOTES
Bedside shift change report given to Madeleine Hinojosa RN (oncoming nurse) by Brian Deluca RN (offgoing nurse). Report included the following information SBAR, Kardex, Procedure Summary, Intake/Output, MAR and Recent Results.

## 2019-09-09 NOTE — PROGRESS NOTES
Nurse Eleni covington bedside report to oncoming nurse Adams Masterson RN by Rowan Matos and Riki Sarabia

## 2019-09-09 NOTE — PROGRESS NOTES
[facility] [address] FRACTURE PROGRESS NOTE    2019  Admit Date:   9/3/2019    Post Op day: 5 Days Post-Op    Subjective:    Tasia Tucker states that he continues with some deep left hip pain. Note events of weekend when patient was found on floor after a fall out of bed. Taking tylenol only for pain as of now. Still somewhat confused. No new issues.   Tolerating diet  Denies N/V/XOB or CP    PT/OT:   Gait:  Gait  Base of Support: Narrowed  Speed/Milka: Slow, Fluctuations  Step Length: Right shortened, Left shortened  Swing Pattern: Left asymmetrical  Stance: Left decreased  Gait Abnormalities: Decreased step clearance, Antalgic  Ambulation - Level of Assistance: Minimal assistance, Assist x2  Distance (ft): 150 Feet (ft)  Assistive Device: Gait belt, Walker, rolling                 Vital Signs:    Patient Vitals for the past 8 hrs:   BP Temp Pulse Resp SpO2 Weight   19 0820 144/86 98.1 °F (36.7 °C) 75 16 94 %    19 0727      61.8 kg (136 lb 3.2 oz)   19 0543 147/65  80      19 0529 139/68  82      19 0515 145/72  86      19 0500 162/78  78      19 0305 168/72 98.1 °F (36.7 °C) 80 16 96 %      Temp (24hrs), Av.1 °F (36.7 °C), Min:98 °F (36.7 °C), Max:98.1 °F (36.7 °C)      Pain Control:   Pain Assessment  Pain Scale 1: Numeric (0 - 10)  Pain Intensity 1: 0  Pain Location 1: Hip  Pain Orientation 1: Left  Pain Description 1: Aching, Dull  Pain Intervention(s) 1: Medication (see MAR), Repositioned, Rest    Meds:    Current Facility-Administered Medications   Medication Dose Route Frequency    amLODIPine (NORVASC) tablet 10 mg  10 mg Oral DAILY    enoxaparin (LOVENOX) injection 40 mg  40 mg SubCUTAneous DAILY    metoprolol tartrate (LOPRESSOR) tablet 50 mg  50 mg Oral BID    sodium chloride (NS) flush 5-40 mL  5-40 mL IntraVENous Q8H    sodium chloride (NS) flush 5-40 mL  5-40 mL IntraVENous PRN    naloxone (NARCAN) injection 0.4 mg  0.4 mg IntraVENous PRN    calcium-vitamin D (OS-OLEKSANDR) 500 mg-200 unit tablet  1 Tab Oral TID WITH MEALS    senna-docusate (PERICOLACE) 8.6-50 mg per tablet 1 Tab  1 Tab Oral BID    polyethylene glycol (MIRALAX) packet 17 g  17 g Oral DAILY    bisacodyl (DULCOLAX) suppository 10 mg  10 mg Rectal DAILY PRN    traMADol (ULTRAM) tablet 50 mg  50 mg Oral Q6H PRN    docusate sodium (COLACE) capsule 100 mg  100 mg Oral BID PRN    nitroglycerin (NITROSTAT) tablet 0.4 mg  0.4 mg SubLINGual Q5MIN PRN    sodium chloride (NS) flush 5-40 mL  5-40 mL IntraVENous Q8H    sodium chloride (NS) flush 5-40 mL  5-40 mL IntraVENous PRN    naloxone (NARCAN) injection 0.4 mg  0.4 mg IntraVENous PRN    hydrALAZINE (APRESOLINE) 20 mg/mL injection 10 mg  10 mg IntraVENous Q6H PRN    acetaminophen (TYLENOL) tablet 500 mg  500 mg Oral Q6H PRN       LAB:    No results for input(s): HCT, HGB, INR, HCTEXT, HGBEXT in the last 72 hours.     No lab exists for component: INREXT    Transfuse PRBC's:      Assessment & Physician's Comment:  Log roll of left leg free and painless  Lateral hip NTTP  5/5 strength for DF/PF bilaterally  Distal pulses palpable  Dressing is clean, dry, and intact  Neurovascular checks within normal limits  Orientation:  Oriented    Active Problems:    Fracture of unspecified part of neck of left femur, initial encounter for closed fracture ([address] (9/3/2019)      Hypertension (9/9/2019)      Fall from ground level (9/9/2019)        Plan:    Cont PT/OT  Tylenol for pain  ASA for DVT prophylaxis - apparently was taking 325mg ASA PTA which is acceptable   Medical management per primary team  Case Management for disposition planning  OK to discharge from Ortho perspective - follow-up in 3-4wks in the office with Dr Jacquelyn Peck PA-C   Orthopedic Trauma Service  [address] [place] [address]

## 2019-09-10 NOTE — PROGRESS NOTES
Problem: Self Care Deficits Care Plan (Adult)  Goal: *Acute Goals and Plan of Care (Insert Text)  Description  FUNCTIONAL STATUS PRIOR TO ADMISSION: Patient was indepenent at home without DME per his report and confirmed by his neighbor. The patient completed all his own ADLs and IADLs to include driving and gardening. HOME SUPPORT: The patient lived alone with a supportive community to provide assistance. Per his neighbor who was present during session his family does not live locally but they do check in occasionally. He travels to [place] to live with his daughter starting in December until late March. Occupational Therapy Goals  Initiated 9/5/2019. Reviewed 9/9/19 and no goals met at this time. All remain appropriate to be met over the next 4 days. 1.  Patient will perform lower body ADLs with AE supervision/set-up within 4 day(s). 2.  Patient will perform upper body ADLs standing 5 mins without fatigue or LOB with supervision/set-up within 4 day(s). 3.  Patient will perform toilet transfer with supervision/set-up within 4 day(s). 4.  Patient will perform all aspects of toileting with supervision/set-up within 4 day(s). 5.  Patient will participate in upper extremity therapeutic exercise/activities with supervision/set-up for 10 minutes within 4 day(s). 6.  Patient will utilize energy conservation techniques during functional activities without cues within 4 day(s). Outcome: Progressing Towards Goal  OCCUPATIONAL THERAPY TREATMENT  Patient: Cami Zamora (89 y.o. male)  Date: 9/10/2019  Diagnosis: Fracture of unspecified part of neck of left femur, [facility] [S72.002A] <principal problem not specified>  Procedure(s) (LRB):  LEFT HIP HEMIARTHROPLASTY (Left) 6 Days Post-Op  Precautions: Fall, WBAT  Chart, occupational therapy assessment, plan of care, and goals were reviewed. ASSESSMENT  Pt was cleared for OT by nursing.  Pt received seated in chair and agreeable to OT. Pt presents with notably impaired cognition (memory, safety, problem solving) directly impacting functional performance. Pt activity tolerance improving overall. Pt tolerated in hallway mobility. In reviewing orientation and safety strategies pt with persistent difficulty. Current Level of Function Impacting Discharge (ADLs): Safety and cognition are primary barriers. Other factors to consider for discharge: Pt reportedly resided independently PTA. PLAN :  Patient continues to benefit from skilled intervention to address the above impairments. Continue treatment per established plan of care. to address goals. Recommend with staff: OOB-chair at least 3x/ day (all meals) with chair alarm and toileting bathroom with walker and staff assist.    Recommend next OT session: Standing bathroom level ADL as pt tolerates. Recommendation for discharge: (in order for the patient to meet his/her long term goals)  Therapy 3 hours per day 5-7 days per week    This discharge recommendation:  Has not yet been discussed the attending provider and/or case management    Equipment recommendations for successful discharge (if) home: to be determined by rehab facility       SUBJECTIVE:   Patient stated Home.  (when OT asked pt his current location)    OBJECTIVE DATA SUMMARY:   Cognitive/Behavioral Status:  Neurologic State: Alert  Orientation Level: Oriented to person;Disoriented to place; Disoriented to situation;Disoriented to time  Cognition: Impaired decision making;Memory loss  Perception: Appears intact  Perseveration: Perseverates during conversation  Safety/Judgement: Decreased awareness of environment;Decreased awareness of need for assistance;Decreased awareness of need for safety;Decreased insight into deficits    Functional Mobility and Transfers for ADLs:  Bed Mobility:  Supine to Sit: Other (comment)(presented from chair)  Sit to Supine: Other (comment)(returned to chair)    Transfers:  Sit to Stand: Minimum assistance;Assist x1;Other (comment)(Cues to push up from chair Simultaneous filing. User may not have seen previous data.)  Functional Transfers  Bathroom Mobility: (Pt declined toileting/bathroom)       Balance:  Sitting: Intact(Simultaneous filing. User may not have seen previous data.)  Sitting - Static: Good (unsupported)  Sitting - Dynamic: Fair (occasional)  Standing: Impaired; With support(Simultaneous filing. User may not have seen previous data.)  Standing - Static: Constant support;Good  Standing - Dynamic : Fair;Constant support    ADL Intervention:                                     Cognitive Retraining  Orientation Retraining: Awareness of environment; Reorienting  Problem Solving: Awareness of environment  Executive Functions: Executing cognitive plans  Organizing/Sequencing: Breaking task down  Attention to Task: Single task  Following Commands: Follows one step commands/directions(Follows approximately 50% simple commands)  Safety/Judgement: Decreased awareness of environment;Decreased awareness of need for assistance;Decreased awareness of need for safety;Decreased insight into deficits  Cues: Tactile cues provided;Verbal cues provided;Visual cues provided    Pain:  None reported. Activity Tolerance:   Fair  Please refer to the flowsheet for vital signs taken during this treatment.     After treatment patient left in no apparent distress:   Sitting in chair, Call bell within reach and Bed / chair alarm activated    COMMUNICATION/COLLABORATION:   The patients plan of care was discussed with: Physical Therapist and Registered Nurse    Harjit Delgado  Time Calculation: 16 mins

## 2019-09-10 NOTE — PROGRESS NOTES
I have reviewed discharge instructions with the patient's daughter  The patient's daughter verbalized understanding.     The patient has his belongings(iphone, iphone , clothes, dentures) prescriptions, discharge instructions, kardex, mar   Gave report to Rikki Oro RN at [facility]

## 2019-09-10 NOTE — PROGRESS NOTES
JYOTI-D/c to [facility] today at [place] [address].    CM received a call from [place] at [address] [place] [address] this  that she has auth from [facility] to be admitted there. CM informed attending and she is going to d/c pt today. CM spoke with pt's daughter, Antonia Gaston, to inform her and she is in agreement with d/c today. CM set up ambulance with [facility] for 4pm today. An envelope containing pt's kardex, MAR,and AVS will be sent with pt to [facility] today. Also, pt's nurse will call report.  Keenan Kohler

## 2019-09-10 NOTE — PROGRESS NOTES
Transition of Care Plan to SNF/Rehab    SNF/Rehab Transition:  Patient has been accepted to [facility] and meets criteria for admission. Patient will transported by [facility] and expected to leave at 4pm.    Communication to Patient/Family:  Met with patient and spoke with his daughter Dimitry Villatoro and they are agreeable to the transition plan. Communication to SNF/Rehab:  Bedside RN, Matheus Marvin, has been notified to update the transition plan to the facility and call report ([phone]). Discharge information has been updated on the AVS.     Discharge instructions to be fax'd to facility at (N/A-Referral sent in CC Link)    SNF/Rehab Transition:  Patient to follow-up with Home Health: [facility], N/A)  PCP/Specialist: N/A  Ambulatory Care Management: N/A    Reviewed and confirmed with [facility] they can manage the patient care needs for the following:     Misti Currie with (X) only those applicable:    Medication:  [x]  Medications will be available at the facility  []  IV Antibiotics   []  Controlled Substance - hard copy to be sent with patient   []  Weekly Labs   Documents:  [] Hard RX  [x] MAR  [x] Kardex  [x] AVS  []Transfer Summary  []Discharge   Equipment:  []  CPAP/BiPAP  []  Wound Vacuum  []  Lucero or Urinary Device  []  PICC/Central Line  []  Nebulizer  []  Ventilator   Treatment:  []Isolation (for MRSA, VRE, etc.)  []Surgical Drain Management  []Tracheostomy Care  []Dressing Changes  []Dialysis with transportation and chair time   []PEG Care  []Oxygen  []Daily Weights for Heart Failure   Dietary:  []Any diet limitations  []Tube Feedings   []Total Parenteral Management (TPN)   Eligible for Medicaid Long Term Services and Supports  Yes:  [] Eligible for medical assistance or will become eligible within 180 days and UAI completed. [] Provider/Patient and/or support system has requested screening.   [] UAI copy provided to patient or responsible party, N/A  [] UAI unavailable at discharge will send once processed to SNF provider. [] UAI unavailable at discharged mailed to patient  No:   [x] Private pay and is not financially eligible for Medicaid within the next 180 days. [] Reside out-of-state.   [] A residents of a state owned/operated facility that is licensed  by [facility] or [facility]  [] Enrollment in [facility] hospice services  [] [address]  [] Patient /Family declines to have screening completed or provide financial information for screening     Financial Resources:  Medicaid    [] Initiated and application pending   [] Full coverage     Advanced Care Plan:  []Surrogate Decision Maker of Care  []POA  []Communicated Code Status Full          )    Other

## 2019-09-10 NOTE — PROGRESS NOTES
CM verified patient has a qualifying hospital stay for [facility].       Naina Espinoza , GIRISHW/CRM

## 2019-09-10 NOTE — PROGRESS NOTES
Problem: Mobility Impaired (Adult and Pediatric)  Goal: *Acute Goals and Plan of Care (Insert Text)  Description  FUNCTIONAL STATUS PRIOR TO ADMISSION: Patient was independent and active without use of DME. HHOME SUPPORT PRIOR TO ADMISSION: The patient lived alone with no local support. Physical Therapy Goals  Initiated 9/5/2019    1. Patient will move from supine to sit and sit to supine  in bed with minimal assistance/contact guard assist within 4 days. 2. Patient will perform sit to stand with minimal assistance/contact guard assist within 4 days. 3. Patient will ambulate with minimal assistance/contact guard assist for 25 feet with the least restrictive device within 4 days. 4. Patient will perform hip home exercise program with minimal assistance/contact guard assist within 4 days. Reassessment 9/10/19    1. Patient will move from supine to sit and sit to supine  in bed with minimal assistance/contact guard assist within 4 days. 2. Patient will perform sit to stand with minimal assistance/contact guard assist within 4 days. 3. Patient will ambulate with minimal assistance/contact guard assist for 25 feet with the least restrictive device within 4 days. (GOAL MET: RESET  ft). 4. Patient will perform hip home exercise program with minimal assistance/contact guard assist within 4 days. One goal met and reset. Improving toward remaining goals and they remain appropriate.                Outcome: Progressing Towards Goal   PHYSICAL THERAPY REEVALUATION  Patient: Corina Esquivel (10 y.o. male)  Date: 9/10/2019  Primary Diagnosis: Fracture of unspecified part of neck of left femur, [facility] [S72.002A]  Procedure(s) (LRB):  LEFT HIP HEMIARTHROPLASTY (Left) 6 Days Post-Op   Precautions:  Fall, WBAT      ASSESSMENT  Based on the objective data described below, the patient presents with  impairment in functional mobility, activity tolerance and balance s/p fall & subsequent left hip steve-arthroplasty. He has met one goal (gait & gait distance) and this has been reset. He has improved toward remaining 3 goals and I expect that he will continue to improve in rehab. Current Level of Function Impacting Discharge (mobility/balance): Patient presented in chair, requiring only minimal assistance of 1 for sit-stand and stand-sit. Ambulated 125 ft with RW, gait belt and minimal-contact guard assistance. Functional Outcome Measure: The patient scored 45/100 on the Barthel outcome measure which is indicative of moderate impairment in functional mobility and ADLs. Other factors to consider for discharge: Improving toward goals. Fairly high PLOF. Patient will benefit from skilled therapy intervention to address the above noted impairments. PLAN :  Recommendations and Planned Interventions: bed mobility training, transfer training, gait training, therapeutic exercises, patient and family training/education and therapeutic activities      Frequency/Duration: Patient will be followed by physical therapy:  twice daily to address goals. Recommendation for discharge: (in order for the patient to meet his/her long term goals)  Patient will benefit from rehab 5-7 days/week in [facility]. This discharge recommendation:  Has been made in collaboration with the attending provider and/or case management    Equipment recommendations for successful discharge (if) home: to be determined by rehab facility         SUBJECTIVE:   Patient stated Anne Melgar had the ball of the hip fixed.     OBJECTIVE DATA SUMMARY:   HISTORY:    Past Medical History:   Diagnosis Date    CAD (coronary artery disease)     DDD (degenerative disc disease), lumbar     Sciatica      Past Surgical History:   Procedure Laterality Date    [address] course since last seen and reason for reevaluation: Patient is 5 days post L hemiarthroplasty.  One goal met and reset. Improving toward remaining goals and they remain appropriate. Personal factors and/or comorbidities impacting plan of care: Patient is motivated and cooperative, but appears to be having memory issues. He is showing good improvement and evidently had a fairly high PLOF prior to this fall and subsequent surgery. Home Situation  Home Environment: Private residence  Living Alone: Yes  Support Systems: Friends \ neighbors  Patient Expects to be Discharged to[de-identified] Rehabilitation facility    EXAMINATION/PRESENTATION/DECISION MAKING:   Critical Behavior:  Neurologic State: Alert  Orientation Level: Oriented to person, Disoriented to place, Disoriented to situation, Disoriented to time  Cognition: Impaired decision making, Memory loss  Safety/Judgement: Decreased awareness of environment, Decreased awareness of need for assistance, Decreased awareness of need for safety, Decreased insight into deficits  Hearing: Auditory  Auditory Impairment: None  Range Of Motion:  AROM: Generally decreased, functional           PROM: Generally decreased, functional           Strength:    Strength: Generally decreased, functional                    Tone & Sensation:   Tone: Normal              Sensation: Intact               Coordination:  Coordination: Generally decreased, functional  Vision:      Functional Mobility:  Bed Mobility:     Supine to Sit: Other (comment)(presented from chair)  Sit to Supine: Other (comment)(returned to chair)     Transfers:  Sit to Stand: Minimum assistance;Assist x1;Other (comment)(Cues to push up from chair Simultaneous filing. User may not have seen previous data.)  Stand to Sit: Minimum assistance; Additional time(Simultaneous filing. User may not have seen previous data.)                       Balance:   Sitting: Intact(Simultaneous filing. User may not have seen previous data.)  Sitting - Static: Good (unsupported)  Sitting - Dynamic: Fair (occasional)  Standing: Impaired; With support(Simultaneous filing. User may not have seen previous data.)  Standing - Static: Good;Constant support; Other (comment)(requires correction for backward lean with INITIAL standing Simultaneous filing. User may not have seen previous data.)  Standing - Dynamic : Good;Constant support(Simultaneous filing. User may not have seen previous data.)  Ambulation/Gait Training:  Distance (ft): 125 Feet (ft)(no seated rest breaks)  Assistive Device: Walker, rolling;Gait belt  Ambulation - Level of Assistance: Minimal assistance        Gait Abnormalities: Antalgic;Decreased step clearance     Left Side Weight Bearing: As tolerated  Base of Support: Narrowed  Stance: Left decreased  Speed/Milka: Slow;Shuffled  Step Length: Right shortened  Swing Pattern: Left asymmetrical              Therapeutic Exercises:   Still requires hands-on assistance and cues in order to perform post-MILAGRO exercise protocol. Functional Measure:  Barthel Index:    Bathin  Bladder: 5  Bowels: 5  Groomin  Dressin  Feedin  Mobility: 10  Stairs: 0  Toilet Use: 5  Transfer (Bed to Chair and Back): 10  Total: 45/100       The Barthel ADL Index: Guidelines  1. The index should be used as a record of what a patient does, not as a record of what a patient could do. 2. The main aim is to establish degree of independence from any help, physical or verbal, however minor and for whatever reason. 3. The need for supervision renders the patient not independent. 4. A patient's performance should be established using the best available evidence. Asking the patient, friends/relatives and nurses are the usual sources, but direct observation and common sense are also important. However direct testing is not needed. 5. Usually the patient's performance over the preceding 24-48 hours is important, but occasionally longer periods will be relevant. 6. Middle categories imply that the patient supplies over 50 per cent of the effort.   7. Use of aids to be independent is allowed. Vinod Jerez., Barthel, DJenW. (0743). Functional evaluation: the Barthel Index. [address] (14)2. EMEKA Nguyen, Emily oTrres., Brittny Cooley., Kali, [address] (1999). Measuring the change indisability after inpatient rehabilitation; comparison of the responsiveness of the Barthel Index and Functional Edgar Measure. Journal of Neurology, Neurosurgery, and Psychiatry, 66(4), 094-565. Marta Luna NLIZBETHA, KERRY Ornelas, & Paco Garcia M.A. (2004.) Assessment of post-stroke quality of life in cost-effectiveness studies: The usefulness of the Barthel Index and the EuroQoL-5D. Quality of Life Research, 15, 491-87           Physical Therapy Evaluation Charge Determination   History Examination Presentation Decision-Making   MEDIUM  Complexity : 1-2 comorbidities / personal factors will impact the outcome/ POC  MEDIUM Complexity MEDIUM Complexity : Evolving with changing characteristics  HIGH Complexity : FOTO score of 1- 25       Based on the above components, the patient evaluation is determined to be of the following complexity level: MEDIUM    Pain Rating:  3/10    Activity Tolerance:   Good      After treatment patient left in no apparent distress:   Sitting in chair, Call bell within reach and nurse notified, tray table in place. COMMUNICATION/EDUCATION:   The patients plan of care was discussed with: Occupational Therapist and Registered Nurse. Fall prevention education was provided and the patient/caregiver indicated understanding., Patient/family have participated as able in goal setting and plan of care. and Patient/family agree to work toward stated goals and plan of care.     Thank you for this referral.  Jasmin Rocha   Time Calculation: 30 mins

## 2019-09-10 NOTE — PROGRESS NOTES
CRM followed up on the 4pm ambulance transport with [facility]. The call was passed to [address] [place] [address] for 5 or 5:30pm. Sneha HERNANDEZ on the unit notified.      Cynthia Pablo, [address] [place] [address]   [phone]

## 2019-09-10 NOTE — DISCHARGE SUMMARY
Discharge Summary       PATIENT ID: Nazario Saucedo  MRN: [id number]   YOB: 1927    DATE OF ADMISSION: 9/3/2019  9:28 AM    DATE OF DISCHARGE: 9/10/2019  PRIMARY CARE PROVIDER: Nataliya Chou MD     ATTENDING PHYSICIAN: Franco Dang DO   DISCHARGING PROVIDER: Franco Dang DO    To contact this individual call [phone] and ask the  to page. If unavailable ask to be transferred the Adult Hospitalist Department. CONSULTATIONS: IP CONSULT TO ORTHOPEDIC SURGERY  IP CONSULT TO CARDIOLOGY    PROCEDURES/SURGERIES: Procedure(s):  LEFT HIP HEMIARTHROPLASTY    [id number] [facility] COURSE:     Pt was brought into the ED by EMS for hip pain, s/p fall. Found to have left hip fracture. Underwent left hip hemiarthroplasty. Stable for discharge.            DISCHARGE DIAGNOSES / PLAN:    L Hip Fracture:   -  X-ray showed left femoral neck fracture. - s/p Left hip hemiarthroplasty 9/4/2019  - continue with bowel regimen  - DVT ppx as per Ortho: on lovenox during hospitalization, discharge on aspirin 325 daily      Hypertension:  - initiated on amlodipine and metoprolol      Hx coronary artery disease with valvular replacement:  - no any acute cardiac issues that needs intervention  - EKG, which showed first-degree AV block  - cardiology evaluated before surgery  - pt 325 mg ASA at home  - SR with frequent PVCs on tele. Labs ok     Underweight:   - BMI 19.9 with this appearance  - nutrition has seen, pt on oral supplements for additional calories     Disorientation:   - can be re-oriented, he remembers how he feel consistently - sometimes has trouble remembering where he is.        FOLLOW UP APPOINTMENTS:    Follow-up Information     Follow up With Specialties Details Why [address]    Nataliya Chou MD Family Practice In 7 days after discharge from rehab  [address] [facility] [address] Dean Valero MD Orthopedic Surgery In 4 weeks post op follow up, please call to schedule this appointment as soon as possible [address]:  Current Discharge Medication List      START taking these medications    Details   amLODIPine (NORVASC) 10 mg tablet Take 1 Tab by mouth daily. Qty: 30 Tab, Refills: 0      calcium-vitamin D (OYSTER SHELL) 500 mg(1,250mg) -200 unit per tablet Take 1 Tab by mouth three (3) times daily (with meals). Qty: 90 Tab, Refills: 0      metoprolol tartrate (LOPRESSOR) 50 mg tablet Take 1 Tab by mouth two (2) times a day. Qty: 60 Tab, Refills: 0      traMADol (ULTRAM) 50 mg tablet Take 1 Tab by mouth every eight (8) hours as needed for Pain for up to 3 days. Max Daily Amount: 150 mg.  Qty: 20 Tab, Refills: 0    Associated Diagnoses: Fracture of unspecified part of neck of left femur, initial encounter for closed fracture (HCC)      acetaminophen (TYLENOL) 500 mg tablet Take 1 Tab by mouth every six (6) hours. Qty: 60 Tab, Refills: 0         CONTINUE these medications which have CHANGED    Details   docusate sodium (COLACE) 100 mg capsule Take 1 Cap by mouth two (2) times daily as needed for Constipation for up to 90 days. Qty: 60 Cap, Refills: 0         CONTINUE these medications which have NOT CHANGED    Details   aspirin delayed-release 325 mg tablet Take 325 mg by mouth daily. STOP taking these medications       nitroglycerin (NITROSTAT) 0.4 mg SL tablet Comments:   Reason for Stopping:                 NOTIFY YOUR PHYSICIAN FOR ANY OF THE FOLLOWING:   Fever over 101 degrees for 24 hours. Chest pain, shortness of breath, fever, chills, nausea, vomiting, diarrhea, change in mentation, falling, weakness, bleeding. Severe pain or pain not relieved by medications.   Or, any other signs or symptoms that you may have questions about. DISPOSITION:    Home With:   OT  PT  HH  RN      x Long term SNF/Inpatient Rehab    Independent/assisted living    Hospice    Other:       PATIENT CONDITION AT DISCHARGE:     Functional status    Poor    x Deconditioned     Independent      Cognition     Lucid    x Forgetful     Dementia      Catheters/lines (plus indication)    Lucero     PICC     PEG    x None      Code status   x  Full code     DNR      PHYSICAL EXAMINATION AT DISCHARGE:     Constitutional:  No acute distress, cooperative, pleasant    ENT:  Oral MM moist, oropharynx benign. Resp:  clear to auscultation. No accessory muscle use and on RA   CV:  Regular rhythm, normal rate. SR with PVCs (frequent)    GI:  Soft, non distended, non tender.  Normoactive bowel sounds + BM    Musculoskeletal:  No edema, warm, 2+ pulses throughout    Neurologic:  Moves all extremities   Psych: Calm and cooperative           CHRONIC MEDICAL DIAGNOSES:  Problem List as of 9/10/2019 Date Reviewed: 7/2/2015          Codes Class Noted - Resolved    Hypertension ICD-10-CM: I10  ICD-9-CM: 401.9  9/9/2019 - Present        Fall from ground level ICD-10-CM: M87.62JQ  ICD-9-CM: E888.9  9/9/2019 - Present        Fracture of unspecified part of neck of left femur, initial encounter for closed fracture [facility]) ICD-10-CM: N08.677Y  ICD-9-CM: 820.8  9/3/2019 - Present              Greater than 30 minutes were spent with the patient on counseling and coordination of care    Signed:   Juana Sosa DO  9/10/2019  5:42 PM

## 2019-10-06 PROBLEM — J18.9 PNEUMONIA: Status: ACTIVE | Noted: 2019-01-01

## 2019-10-06 NOTE — ED PROVIDER NOTES
80 y.o. male with past medical history significant for CAD, DDD, sciatica who presents from home via EMS with chief complaint of fever. Per pt's daughter, pt was d/c home from rehab 3 days ago to live with her. Pt has been unable to ambulate or walk up the stairs since then. Pt has had increased weakness in legs since being home. His fatigue was worse today. He developed a fever today. She also endorses cough and states that rehab was concerned that he has a swallowing disorder. He has had trouble swallowing recently. Pt denies pain or complaints. He has no pain now. He denies CP or SOB. No abd pain. No dysuria. Pt was admitted to the hospital for a week following a hip replacement on 9/3/19. There are no other acute medical concerns at this time. Social hx: denies tobacco use, +EtOH consumption PCP: Quin Santiago MD 
 
Full history, physical exam, and ROS unable to be obtained due to:  dementia. Note written by Gurinder Amaya, as dictated by Kwabena Bowie MD 5:20 PM 
 
 
The history is provided by a relative. No  was used. Past Medical History:  
Diagnosis Date  CAD (coronary artery disease)  DDD (degenerative disc disease), lumbar  Sciatica Past Surgical History:  
Procedure Laterality Date  HX CORONARY ARTERY BYPASS GRAFT No family history on file. Social History Socioeconomic History  Marital status:  Spouse name: Not on file  Number of children: Not on file  Years of education: Not on file  Highest education level: Not on file Occupational History  Not on file Social Needs  Financial resource strain: Not on file  Food insecurity:  
  Worry: Not on file Inability: Not on file  Transportation needs:  
  Medical: Not on file Non-medical: Not on file Tobacco Use  Smoking status: Never Smoker  Smokeless tobacco: Never Used Substance and Sexual Activity  Alcohol use: Yes  Drug use: Never  Sexual activity: Not on file Lifestyle  Physical activity:  
  Days per week: Not on file Minutes per session: Not on file  Stress: Not on file Relationships  Social connections:  
  Talks on phone: Not on file Gets together: Not on file Attends Hoahaoism service: Not on file Active member of club or organization: Not on file Attends meetings of clubs or organizations: Not on file Relationship status: Not on file  Intimate partner violence:  
  Fear of current or ex partner: Not on file Emotionally abused: Not on file Physically abused: Not on file Forced sexual activity: Not on file Other Topics Concern  Not on file Social History Narrative  Not on file ALLERGIES: Patient has no known allergies. Review of Systems Unable to perform ROS: Dementia There were no vitals filed for this visit. Physical Exam  
Constitutional: He is oriented to person, place, and time. He appears well-developed. No distress. Elderly, frail appearing HENT:  
Head: Normocephalic and atraumatic. Right Ear: External ear normal.  
Left Ear: External ear normal.  
Eyes: Pupils are equal, round, and reactive to light. EOM are normal.  
Neck: Normal range of motion. Neck supple. No JVD present. No tracheal deviation present. Cardiovascular: Normal rate, regular rhythm and normal heart sounds. Exam reveals no gallop and no friction rub. No murmur heard. Pulmonary/Chest: Effort normal and breath sounds normal. No stridor. No respiratory distress. He has no wheezes. He has no rales. Abdominal: Soft. Bowel sounds are normal. He exhibits no distension. There is no tenderness. There is no rebound and no guarding. Musculoskeletal: Normal range of motion. He exhibits edema (1+ bilateral lower extremity). He exhibits no tenderness. Neurological: He is alert and oriented to person, place, and time.  He has normal reflexes. No cranial nerve deficit. Coordination normal.  
5/5  strength Nl plantar felxion Skin: Skin is warm and dry. No rash noted. He is not diaphoretic. No erythema. Psychiatric: He has a normal mood and affect. His behavior is normal. Judgment and thought content normal.  
Nursing note and vitals reviewed. Note written by Gurinder Tellez, as dictated by Griffin Hawley MD 5:23 PM 
 
 
MDM Number of Diagnoses or Management Options Diagnosis management comments: 80-year-old white male presents to the emergency department with fever and fatigue. Patient had hip replacement 1 month ago. He has been home for a couple of days with the daughter. He has had increasing weakness. He also has developed a fever. Patient looks well and nontoxic currently. He has a fever. Will check urinalysis, basic blood work, chest x-ray. Will give IV fluids and Tylenol. Patient agrees. Amount and/or Complexity of Data Reviewed Clinical lab tests: ordered and reviewed Tests in the radiology section of CPT®: ordered and reviewed Tests in the medicine section of CPT®: ordered and reviewed Decide to obtain previous medical records or to obtain history from someone other than the patient: yes Obtain history from someone other than the patient: yes Review and summarize past medical records: yes Independent visualization of images, tracings, or specimens: yes Procedures ED EKG interpretation: 
Rhythm: normal sinus rhythm; and regular . Rate (approx.): 69; Axis: normal; ST/T wave: normal; nl intervals Note written by Gurinder Tellez, as dictated by Griffin Hawley MD 5:52 PM 
 
 
PROGRESS NOTE: 
6:24 PM 
Chest X ray shows new patchy air space disease bilaterally, L>R, will treat with broad spectrum abx given recent hospital stay Labs reviewed Hospitalist Tremaine for Admission 6:57 PM 
 
ED Room Number: TG77/06 Patient Name and age:  Moustapha Wells 80 y.o.  male Working Diagnosis: 1. Pneumonia of left lower lobe due to infectious organism [facility]) 2. Fever, unspecified fever cause 3. Lethargy Readmission: yes Isolation Requirements:  no 
Recommended Level of Care:  telemetry Code Status:  Full Code Department:[facility] Adult ED - [phone] Other:   
 
 6:58 PM 
[place] texted Dr Pauline Melo hospitalist

## 2019-10-06 NOTE — H&P
HISTORY AND PHYSICAL Urbano Peck MD 
 
 
 
PCP: David Skinner MD 
 
Chief Complaint:  
Subjective Fever and increased weakness History of present illness:  
Patient is a 80year old Male medical history significant for HTN , CAD,and DDD who presents to the Emergency Department accompanied by Daughter via EMS with chief complaint of fever. Patient complains of subjective fever, decreased oral intake  increased weakness after he was discharged from rehab 3 days. 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 He denies chills , Shortness of breath , chest pain , palpitations , leg swelling , syncope or near syncope , headaches , seizures or change in mentation , urinary or bowel complains. In the Emergency Department , T: 101.2 F ,CBC was remarkable for leucocytosis , BMP unremarkable ,U/A , LA and troponin were wnl , CT of the head shows no acute process and CXT : New patchy bilateral airspace disease likely infectious or inflammatory. Patient is being admitted to the floor for further evaluations and management. PMH/PSH: 
Past Medical History:  
Diagnosis Date  CAD (coronary artery disease)  DDD (degenerative disc disease), lumbar  Sciatica Past Surgical History:  
Procedure Laterality Date  HX CORONARY ARTERY BYPASS GRAFT Home meds:  
Prior to Admission medications Medication Sig Start Date End Date Taking? Authorizing Provider  
docusate sodium (COLACE) 100 mg capsule Take 1 Cap by mouth two (2) times daily as needed for Constipation for up to 90 days. 9/9/19 12/8/19 Yes Tom Glynn NP  
calcium-vitamin D (OYSTER SHELL) 500 mg(1,250mg) -200 unit per tablet Take 1 Tab by mouth three (3) times daily (with meals). 9/9/19  Yes Mary Christianson NP  
metoprolol tartrate (LOPRESSOR) 50 mg tablet Take 1 Tab by mouth two (2) times a day.  9/9/19  Yes Mary Christianson NP  
 acetaminophen (TYLENOL) 500 mg tablet Take 1 Tab by mouth every six (6) hours. 9/9/19  Yes Corbin Garner NP  
aspirin delayed-release 325 mg tablet Take 325 mg by mouth daily. Yes Provider, Historical  
 
 
Allergies: 
No Known Allergies FH: No family history on file. SH: Social History Tobacco Use  Smoking status: Never Smoker  Smokeless tobacco: Never Used Substance Use Topics  Alcohol use: Yes  
 
 
ROS 
: Constitutional: negative for sweats, malaise and weight loss Eyes: negative for visual disturbance, redness and icterus Ears, nose, mouth, throat, and face: negative for tinnitus, ear drainage, earaches, epistaxis, sore mouth and sore throat Respiratory: negative for hemoptysis or wheezing Cardiovascular: negative for orthopnea, paroxysmal nocturnal dyspnea, claudication Gastrointestinal: negative for dysphagia, odynophagia, dyspepsia, melena and diarrhea Genitourinary:negative for dysuria, nocturia and hematuria Integument/breast: negative for rash, skin lesion(s) and dryness Hematologic/lymphatic: negative for easy bruising, bleeding and blood in stool or urine Musculoskeletal:negative for stiff joints, neck pain and back pain Neurological: negative for vertigo, seizures, speech problems and paresthesia Behavioral/Psych: negative for aggressive behavior, anxiety and bipolar Endocrine: negative for diabetic symptoms including polyuria, polydipsia and polyphagia Allergic/Immunologic: negative for urticaria, hay fever, angioedema and anaphylaxis PHYSICAL EXAM: 
Visit Vitals /43 Pulse 62 Temp 98.6 °F (37 °C) Resp 24 SpO2 94% General:          Alert, cooperative, no distress, appears stated age. HEENT:           Atraumatic, anicteric sclerae, pink conjunctivae No oral ulcers, mucosa moist, throat clear, dentition fair Neck:               Supple, symmetrical,  thyroid: non tender Lungs:             decreased air entry over the base of the post chest  .No Wheezing or Rhonchi. No rales. Chest wall:      No tenderness  No Accessory muscle use. Heart:              Regular  rhythm,  No  murmur   No edema Abdomen:        Soft, non-tender. Not distended. Bowel sounds normal 
Extremities:     No cyanosis. No clubbing,   
                        Skin turgor normal, Capillary refill normal, Radial dial pulse 2+ Skin:                Not pale. Not Jaundiced  No rashes Psych:             Not anxious or agitated. Neurologic:     Alert and oriented to PPT, CNII-XII intact. Motor and sensory exam grossly intact. Labs/Imaging: 
Recent Results (from the past 24 hour(s)) CBC WITH AUTOMATED DIFF Collection Time: 10/06/19  5:29 PM  
Result Value Ref Range WBC 11.8 (H) 4.1 - 11.1 K/uL  
 RBC 3.22 (L) 4.10 - 5.70 M/uL HGB 9.6 (L) 12.1 - 17.0 g/dL HCT 30.7 (L) 36.6 - 50.3 % MCV 95.3 80.0 - 99.0 FL  
 MCH 29.8 26.0 - 34.0 PG  
 MCHC 31.3 30.0 - 36.5 g/dL  
 RDW 13.2 11.5 - 14.5 % PLATELET 467 966 - 510 K/uL MPV 11.0 8.9 - 12.9 FL  
 NRBC 0.0 0  WBC ABSOLUTE NRBC 0.00 0.00 - 0.01 K/uL NEUTROPHILS 69 32 - 75 % LYMPHOCYTES 21 12 - 49 % MONOCYTES 9 5 - 13 % EOSINOPHILS 1 0 - 7 % BASOPHILS 0 0 - 1 % IMMATURE GRANULOCYTES 0 0.0 - 0.5 % ABS. NEUTROPHILS 8.0 1.8 - 8.0 K/UL  
 ABS. LYMPHOCYTES 2.5 0.8 - 3.5 K/UL  
 ABS. MONOCYTES 1.1 (H) 0.0 - 1.0 K/UL  
 ABS. EOSINOPHILS 0.1 0.0 - 0.4 K/UL  
 ABS. BASOPHILS 0.0 0.0 - 0.1 K/UL  
 ABS. IMM. GRANS. 0.1 (H) 0.00 - 0.04 K/UL  
 DF AUTOMATED    
SAMPLES BEING HELD Collection Time: 10/06/19  5:29 PM  
Result Value Ref Range SAMPLES BEING HELD 1RED 1BLUE   
 COMMENT Add-on orders for these samples will be processed based on acceptable specimen integrity and analyte stability, which may vary by analyte. CK W/ REFLX CKMB Collection Time: 10/06/19  5:29 PM  
Result Value Ref Range CK 34 (L) 39 - 308 U/L  
LACTIC ACID Collection Time: 10/06/19  5:29 PM  
Result Value Ref Range Lactic acid 0.8 0.4 - 2.0 MMOL/L  
MAGNESIUM Collection Time: 10/06/19  5:29 PM  
Result Value Ref Range Magnesium 1.9 1.6 - 2.4 mg/dL TROPONIN I Collection Time: 10/06/19  5:29 PM  
Result Value Ref Range Troponin-I, Qt. 0.08 (H) <0.05 ng/mL URINALYSIS W/MICROSCOPIC Collection Time: 10/06/19  5:29 PM  
Result Value Ref Range Color YELLOW/STRAW Appearance CLEAR CLEAR Specific gravity 1.020 1.003 - 1.030    
 pH (UA) 6.5 5.0 - 8.0 Protein 30 (A) NEG mg/dL Glucose NEGATIVE  NEG mg/dL Ketone NEGATIVE  NEG mg/dL Bilirubin NEGATIVE  NEG Blood NEGATIVE  NEG Urobilinogen 1.0 0.2 - 1.0 EU/dL Nitrites NEGATIVE  NEG Leukocyte Esterase NEGATIVE  NEG    
 WBC 0-4 0 - 4 /hpf  
 RBC 0-5 0 - 5 /hpf Epithelial cells FEW FEW /lpf Bacteria NEGATIVE  NEG /hpf Hyaline cast 0-2 0 - 5 /lpf URINE CULTURE HOLD SAMPLE Collection Time: 10/06/19  5:29 PM  
Result Value Ref Range Urine culture hold URINE ON HOLD IN MICROBIOLOGY DEPT FOR 3 DAYS. IF UNPRESERVED URINE IS SUBMITTED, IT CANNOT BE USED FOR ADDITIONAL TESTING AFTER 24 HRS, RECOLLECTION WILL BE REQUIRED. METABOLIC PANEL, COMPREHENSIVE Collection Time: 10/06/19  5:29 PM  
Result Value Ref Range Sodium 139 136 - 145 mmol/L Potassium 4.7 3.5 - 5.1 mmol/L Chloride 107 97 - 108 mmol/L  
 CO2 27 21 - 32 mmol/L Anion gap 5 5 - 15 mmol/L Glucose 110 (H) 65 - 100 mg/dL BUN 34 (H) 6 - 20 MG/DL Creatinine 1.31 (H) 0.70 - 1.30 MG/DL  
 BUN/Creatinine ratio 26 (H) 12 - 20 GFR est AA >60 >60 ml/min/1.73m2 GFR est non-AA 51 (L) >60 ml/min/1.73m2 Calcium 9.2 8.5 - 10.1 MG/DL Bilirubin, total 1.0 0.2 - 1.0 MG/DL  
 ALT (SGPT) 22 12 - 78 U/L  
 AST (SGOT) 26 15 - 37 U/L Alk. phosphatase 156 (H) 45 - 117 U/L Protein, total 6.6 6.4 - 8.2 g/dL Albumin 2.7 (L) 3.5 - 5.0 g/dL Globulin 3.9 2.0 - 4.0 g/dL A-G Ratio 0.7 (L) 1.1 - 2.2 EKG, 12 LEAD, INITIAL Collection Time: 10/06/19  5:44 PM  
Result Value Ref Range Ventricular Rate 69 BPM  
 Atrial Rate 69 BPM  
 P-R Interval 174 ms QRS Duration 90 ms Q-T Interval 408 ms QTC Calculation (Bezet) 437 ms Calculated P Axis 10 degrees Calculated R Axis 35 degrees Calculated T Axis 102 degrees Diagnosis Normal sinus rhythm ST & T wave abnormality, consider lateral ischemia When compared with ECG of 03-SEP-2019 09:54, 
WA interval has decreased Nonspecific T wave abnormality no longer evident in Inferior leads POC CHEM8 Collection Time: 10/06/19  6:40 PM  
Result Value Ref Range Calcium, ionized (POC) 1.16 1.12 - 1.32 mmol/L Sodium (POC) 138 136 - 145 mmol/L Potassium (POC) 4.2 3.5 - 5.1 mmol/L Chloride (POC) 104 98 - 107 mmol/L  
 CO2 (POC) 27 21 - 32 mmol/L Anion gap (POC) 13 10 - 20 mmol/L Glucose (POC) 109 (H) 65 - 100 mg/dL BUN (POC) 32 (H) 9 - 20 mg/dL Creatinine (POC) 1.2 0.6 - 1.3 mg/dL GFRAA, POC >60 >60 ml/min/1.73m2 GFRNA, POC 57 (L) >60 ml/min/1.73m2 Hematocrit (POC) 23 (L) 36.6 - 50.3 % Comment Notified RN or MD immediately by  Recent Labs 10/06/19 
1729 WBC 11.8* HGB 9.6* HCT 30.7*  Recent Labs 10/06/19 
1729   
K 4.7  CO2 27 BUN 34* CREA 1.31* * CA 9.2 MG 1.9 Recent Labs 10/06/19 
1729 SGOT 26 ALT 22 * TBILI 1.0 TP 6.6 ALB 2.7*  
GLOB 3.9 Recent Labs 10/06/19 
1729 TROIQ 0.08* No results for input(s): INR, PTP, APTT, INREXT in the last 72 hours. No results for input(s): PH, PCO2, PO2 in the last 72 hours. XR CHEST PORT Narrative: INDICATION:  Fever, cough, weakness EXAM: Portable chest 1751 hours. Comparison 9/3/2019 FINDINGS: Patient is post median sternotomy. Cardiac silhouette is upper limits 
of normal. Pulmonary vasculature is not engorged. There is new patchy bilateral 
airspace disease most confluent at the left base. No effusion or pneumothorax Impression: IMPRESSION: 
1. . New patchy bilateral airspace disease likely infectious or inflammatory Assessment & Plan: 
=============== Possible Bilateral pneumonia , HCAP  
- discharged from rehab 3 days ago  
- in the setting of fever ,leucocytosis and CXR concerning new bibasilar infiltrates - CT  1. Small bilateral pleural effusions with areas of bilateral lower lobe and 
     lingular atelectasis and bronchiectasis as above. No pulmonary edema or 
     pneumothorax. - In the ED Vanc and Zosyn and  Levaquin started - DC Levaquin and continue Vanc and zosyn  
- blood culture pending   
- IVF  
- Cont to monitor New Bilateral pleural effusion  
- x-ray in 9/3 was unremarkable - No prior CT for comparison - 2/2 CHF ? No echo on file - obtain ECHO in Am  
- No indication for taping Bilateral  bronchiectasis - possible  2/2 recurrent aspiration pneumonia  
- Not in acute exacerbation ( no significant sputum production )  
- x-ray in 9/3 was unremarkable - No prior CT for comparison Dysphagia  
- likely difficulty of initiation of swallowing - Aspiration precautions - Modified barium swallowing pending - Bed side swallow evaluation - Speech Therapy and evaluation  
- Consider GI consult Increased Generalized weakness  
- likely from the above  
- PT/OT  
 
HTN  
BP at goal  
Cont home med's CAD Stable Cont home ASA , BB Statins Patient's Baseline: Ambulates with walker DVT ppx: Lovenox Code status:Full Disposition:TBD Care plan discussed with patient/Family and Nurse Signed By: Napoleon Santos MD   
 October 6, 2019

## 2019-10-06 NOTE — ED TRIAGE NOTES
Pt from home, pt had L hip replacement on 9/4, was in rehab, just released Thursday. Pt confused, with temp of 101.7 tympanic, BP slightly elevated. Pt with some dementia at baseline. Two days ago patient was able to walk up the stairs, but is very weak today. Pt's daughter was unable to get him upon sitting down.

## 2019-10-06 NOTE — PROGRESS NOTES
Admission Medication Reconciliation: 
 
Information obtained from:  Pill bottles in bag at bedside RxQuery data available¹:  YES Comments/Recommendations: Updated PTA meds/reviewed patient's allergies. Patient is confused and can not participate in interview, and there is no family at the bedside. Attempted to call both phone numbers:  
Judy Haskins [phone] Chester Saba [phone] No answers. RN stated daughter may return to assist. Please consider this list in progress only and update when additional information becomes available. Medication changes (since last review): 
Removed Amlodipine (no RX Query data, no bottle) Thank you for allowing me to participate in the care of your patient. Jannette Morris PharmD, RN #[id number] ¹RxQuery pharmacy benefit data reflects medications filled and processed through the patient's insurance, however  
this data does NOT capture whether the medication was picked up or is currently being taken by the patient. Allergies:  Patient has no known allergies. Significant PMH/Disease States:  
Past Medical History:  
Diagnosis Date CAD (coronary artery disease) DDD (degenerative disc disease), lumbar Sciatica Chief Complaint for this Admission:  No chief complaint on file. Prior to Admission Medications:  
Prior to Admission Medications Prescriptions Last Dose Informant Patient Reported? Taking?  
acetaminophen (TYLENOL) 500 mg tablet   No Yes Sig: Take 1 Tab by mouth every six (6) hours. aspirin delayed-release 325 mg tablet   Yes Yes Sig: Take 325 mg by mouth daily. calcium-vitamin D (OYSTER SHELL) 500 mg(1,250mg) -200 unit per tablet   No Yes Sig: Take 1 Tab by mouth three (3) times daily (with meals). docusate sodium (COLACE) 100 mg capsule   No Yes Sig: Take 1 Cap by mouth two (2) times daily as needed for Constipation for up to 90 days. metoprolol tartrate (LOPRESSOR) 50 mg tablet   No Yes Sig: Take 1 Tab by mouth two (2) times a day. Facility-Administered Medications: None Please contact the main inpatient pharmacy with any questions or concerns at [phone] and we will direct you to the clinical pharmacist covering this patient's care while in-house.   
SHANTA Dewey

## 2019-10-07 NOTE — PROGRESS NOTES
Hospitalist Progress Note Peggy Morocho MD 
Answering service: [phone] from in house phone Date of Service:  10/7/2019 NAME:  Marilyn Gray :  3/18/1927 MRN:  [id number] Admission Summary:  
Patient is a 80year old Male medical history significant for HTN , CAD,and DDD who presents to the Emergency Department accompanied by Daughter via EMS with chief complaint of fever. Patient complains of subjective fever, decreased oral intake  increased weakness after he was discharged from rehab 3 days. 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 He denies chills , Shortness of breath , chest pain , palpitations , leg swelling , syncope or near syncope , headaches , seizures or change in mentation , urinary or bowel complains. 
  
In the Emergency Department , T: 101.2 F ,CBC was remarkable for leucocytosis , BMP unremarkable ,U/A , LA and troponin were wnl , CT of the head shows no acute process and CXT : New patchy bilateral airspace disease likely infectious or inflammatory. Patient is being admitted to the floor for further evaluations and management. 
  
Interval history / Subjective:  
  F/u Cough with fever Assessment & Plan:  
 
Possible Bilateral pneumonia , HCAP  
- discharged from rehab 3 days ago  
- in the setting of fever ,leucocytosis and CXR concerning new bibasilar infiltrates - CT chest 10/6 Small bilateral pleural effusions with areas of bilateral lower lobe and lingular atelectasis and bronchiectasis as above. No pulmonary edema or pneumothorax. 
-On Vanc (10/6--) and Zosyn (10/6--) and  Levaquin (10/6--) - DC Levaquin and continue Vanc and zosyn  
- blood culture pending   
- IVF  
- Cont to monitor  
  
New Bilateral (small) pleural effusion  
- x-ray in 9/3 was unremarkable - No prior CT for comparison - 2/2 CHF ? No echo on file 
-Follow Echo 
- No indication for tapping  
  
Bilateral  bronchiectasis - possible  2/2 recurrent aspiration pneumonia  
- Not in acute exacerbation ( no significant sputum production )  
- x-ray in 9/3 was unremarkable - No prior CT for comparison 
  
HTN  
BP at goal  
Cont home med's  
  
CAD with valvular replacement Stable Cont home ASA , BB Statins Recent L Hip Fracture (recent admission): Liz Casey showed left femoral neck fracture 9/3 
- s/p Left hip hemiarthroplasty 9/4/2019 
-Will consult Dr Viridiana Porras Probable dysphagia  
- likely difficulty of initiation of swallowing - Aspiration precautions - Modified barium swallowing if needed - Bed side swallow evaluation - Speech Therapy and evaluation  
  
Increased Generalized weakness  
- likely from the above  
- PT/OT Cardiac diet Code status: FULL CODE. Will consult palliative care DVT prophylaxis: Lovenox PTA: home Baseline: Independent with walker Plan: Continue antibiotics, follow cultures. Likely discharge in 1-2 days Care Plan discussed with: Patient/Family Disposition: TBD Hospital Problems  Date Reviewed: 10/7/2019 Codes Class Noted POA * (Principal) Pneumonia ICD-10-CM: J18.9 ICD-9-CM: 942  10/6/2019 Yes Review of Systems: A comprehensive review of systems was negative except for that written in the HPI. Vital Signs:  
 Last 24hrs VS reviewed since prior progress note. Most recent are: 
Visit Vitals /61 (BP 1 Location: Right arm, BP Patient Position: At rest) Pulse 60 Temp 97.9 °F (36.6 °C) Resp 18 Ht 5' 10\" (1.778 m) Wt 61.8 kg (136 lb 3.9 oz) SpO2 97% BMI 19.55 kg/m² No intake or output data in the 24 hours ending 10/07/19 9911 Physical Examination:  
 
 
     
Constitutional:  No acute distress, cooperative, pleasant ENT:  Oral mucous moist, oropharynx benign. Resp: CTA bilaterally. No wheezing/rhonchi/rales. No accessory muscle use CV:  Regular rhythm, normal rate, no murmurs, gallops, rubs GI:  Soft, non distended, non tender. normoactive bowel sounds, no hepatosplenomegaly Musculoskeletal:  No edema, warm, 2+ pulses throughout Neurologic:  Moves all extremities. AAOx3, CN II-XII reviewed Skin:  Good turgor, no rashes or ulcers Data Review:  
 Review and/or order of clinical lab test 
 
 
Labs:  
 
Recent Labs 10/07/19 
0203 10/06/19 
1729 WBC 10.3 11.8* HGB 8.8* 9.6* HCT 28.0* 30.7*  202 Recent Labs 10/07/19 
0203 10/06/19 
1729  139  
K 5.1 4.7 * 107 CO2 30 27 BUN 35* 34* CREA 1.31* 1.31* * 110* CA 8.3* 9.2 MG  --  1.9 Recent Labs 10/06/19 
1729 SGOT 26 ALT 22 * TBILI 1.0 TP 6.6 ALB 2.7*  
GLOB 3.9 No results for input(s): INR, PTP, APTT, INREXT in the last 72 hours. No results for input(s): FE, TIBC, PSAT, FERR in the last 72 hours. No results found for: FOL, RBCF No results for input(s): PH, PCO2, PO2 in the last 72 hours. Recent Labs 10/06/19 
1729 TROIQ 0.08* Lab Results Component Value Date/Time Cholesterol, total 127 02/19/2010 11:45 AM  
 HDL Cholesterol 55 02/19/2010 11:45 AM  
 LDL, calculated 63.6 02/19/2010 11:45 AM  
 Triglyceride 42 02/19/2010 11:45 AM  
 CHOL/HDL Ratio 2.3 02/19/2010 11:45 AM  
 
Lab Results Component Value Date/Time Glucose (POC) 109 (H) 10/06/2019 06:40 PM  
 Glucose (POC) 91 09/04/2019 02:27 PM  
 Glucose (POC) 94 09/03/2019 03:20 PM  
 
Lab Results Component Value Date/Time  Color YELLOW/STRAW 10/06/2019 05:29 PM  
 Appearance CLEAR 10/06/2019 05:29 PM  
 Specific gravity 1.020 10/06/2019 05:29 PM  
 pH (UA) 6.5 10/06/2019 05:29 PM  
 Protein 30 (A) 10/06/2019 05:29 PM  
 Glucose NEGATIVE  10/06/2019 05:29 PM  
 Ketone NEGATIVE  10/06/2019 05:29 PM  
 Bilirubin NEGATIVE  10/06/2019 05:29 PM  
 Urobilinogen 1.0 10/06/2019 05:29 PM  
 Nitrites NEGATIVE  10/06/2019 05:29 PM  
 Leukocyte Esterase NEGATIVE  10/06/2019 05:29 PM  
 Epithelial cells FEW 10/06/2019 05:29 PM  
 Bacteria NEGATIVE  10/06/2019 05:29 PM  
 WBC 0-4 10/06/2019 05:29 PM  
 RBC 0-5 10/06/2019 05:29 PM  
 
 
 
Medications Reviewed:  
 
Current Facility-Administered Medications Medication Dose Route Frequency  0.9% sodium chloride infusion  75 mL/hr IntraVENous CONTINUOUS  
 aspirin delayed-release tablet 325 mg  325 mg Oral DAILY  calcium-vitamin D (OS-OLEKSANDR) 500 mg-200 unit tablet  1 Tab Oral TID WITH MEALS  docusate sodium (COLACE) capsule 100 mg  100 mg Oral BID PRN  
 metoprolol tartrate (LOPRESSOR) tablet 50 mg  50 mg Oral BID  sodium chloride (NS) flush 5-40 mL  5-40 mL IntraVENous Q8H  
 sodium chloride (NS) flush 5-40 mL  5-40 mL IntraVENous PRN  
 acetaminophen (TYLENOL) tablet 650 mg  650 mg Oral Q4H PRN  
 morphine injection 1 mg  1 mg IntraVENous Q4H PRN  
 ondansetron (ZOFRAN) injection 4 mg  4 mg IntraVENous Q4H PRN  
 enoxaparin (LOVENOX) injection 40 mg  40 mg SubCUTAneous Q24H  piperacillin-tazobactam (ZOSYN) 3.375 g in 0.9% sodium chloride (MBP/ADV) 100 mL  3.375 g IntraVENous Q8H  Vancomycin - pharmacy to dose   Other Rx Dosing/Monitoring  vancomycin (VANCOCIN) 1,000 mg in 0.9% sodium chloride (MBP/ADV) 250 mL  1,000 mg IntraVENous Q24H  
 
______________________________________________________________________ EXPECTED LENGTH OF STAY: - - - 
ACTUAL LENGTH OF STAY:          1 Bartolo Prader, MD

## 2019-10-07 NOTE — ED NOTES
TRANSFER - OUT REPORT: 
 
Verbal report given to RN(name) on Ana Camacho  being transferred to (unit) for routine progression of care Report consisted of patients Situation, Background, Assessment and  
Recommendations(SBAR). Information from the following report(s) SBAR, ED Summary, [facility] and Recent Results was reviewed with the receiving nurse. Lines:  
Peripheral IV 10/06/19 Right Antecubital (Active) Site Assessment Clean, dry, & intact 10/6/2019  5:32 PM  
Phlebitis Assessment 0 10/6/2019  5:32 PM  
Infiltration Assessment 0 10/6/2019  5:32 PM  
Dressing Status Clean, dry, & intact 10/6/2019  5:32 PM  
Dressing Type Transparent 10/6/2019  5:32 PM  
Alcohol Cap Used No 10/6/2019  5:32 PM  
   
Peripheral IV 93/34/80 Left Basilic (Active) Site Assessment Clean, dry, & intact 10/6/2019  5:49 PM  
Phlebitis Assessment 0 10/6/2019  5:49 PM  
Infiltration Assessment 0 10/6/2019  5:49 PM  
Dressing Status Clean, dry, & intact 10/6/2019  5:49 PM  
Dressing Type Transparent 10/6/2019  5:49 PM  
Alcohol Cap Used No 10/6/2019  5:49 PM  
  
 
Opportunity for questions and clarification was provided. Patient transported with: 
 Registered Nurse

## 2019-10-07 NOTE — CONSULTS
ORTHO CONSULT NOTE Date of Consultation:  2019 Referring Physician:  Tomi Wallace CC: left hip pain HPI:  Meghan Ivan is a 80 y.o. male s/p left hip steve-arthroplasty Dr. Paola Galvez 19 for displaced femoral neck fracture who is admitted for cough/fever c/w pneumonia. He also c/o persistent left hip pain since surgery. He describes left wound drainage that started about 2 weeks ago. Patient took  mg daily for DVT prophylaxis. Past Medical History:  
Diagnosis Date  CAD (coronary artery disease)  DDD (degenerative disc disease), lumbar  Sciatica Past Surgical History:  
Procedure Laterality Date  HX CORONARY ARTERY BYPASS GRAFT No family history on file. Social History Tobacco Use  Smoking status: Never Smoker  Smokeless tobacco: Never Used Substance Use Topics  Alcohol use: Yes No Known Allergies Review of Systems:  Per HPI. Objective:  
 
Patient Vitals for the past 8 hrs: 
 BP Temp Pulse Resp SpO2 Height Weight 10/07/19 1158 123/45     5' 10\" (1.778 m) 61.7 kg (136 lb) 10/07/19 1032 123/45  66      
10/07/19 0818 161/61 97.9 °F (36.6 °C) 60 18 97 %   Temp (24hrs), Av.4 °F (36.9 °C), Min:97.3 °F (36.3 °C), Max:101.2 °F (38.4 °C) EXAM:  
NAD. Lying in bed. Daughter present. Moves RLE OK. LLE lateral hip incision with 1-2 cm opening mid-incision with serous oozing. No significant surrounding erythema and no purulence. He does have some lateral fullness c/w seroma/hematoma. Anterior thigh soft. No pain with passive log roll and flexion of hip. He can AROM left hip but with pain. Calf NT bilat. Moves ankles OK. Labs: WBC 11.8 10 now 10.3 today. Impression:  
 
Patient Active Problem List  
 Diagnosis Date Noted  Pneumonia 10/06/2019  Hypertension 2019  Fall from ground level 2019  Fracture of unspecified part of neck of left femur, initial encounter for closed fracture ([address] 09/03/2019 Principal Problem: 
  Pneumonia (10/6/2019) 
 
left hip wound healing problems 4 weeks s/p left hip steve-arthroplasty. Plan: It is concerning to have wound opening/drainage 4 weeks post-op. Will start with plain xray left hip. Will see if hip improves with abx for pneumonia. May prove to require I&D of suspected seroma later this week but want lungs to improve before considering anesthesia. Dr. Fabio Boudreaux will evaluate tomorrow as Dr. Aguila Knapp is not available this week. Dr. Gaila Siemens is aware and agrees with above plan. KENTON [facility]

## 2019-10-07 NOTE — PROGRESS NOTES
Spiritual Care Assessment/Progress Note [address] 
 
 
NAME: Sandra Mail      MRN: [id number] AGE: 80 y.o. SEX: male Yarsanism Affiliation: No Jew Language: English  
 
10/7/2019 Spiritual Assessment begun in [facility] through conversation with: 
  
    [x]Patient        [x] Family    [] Friend(s) Reason for Consult: Palliative Care, Initial/Spiritual Assessment Spiritual beliefs: (Please include comment if needed) [x] Identifies with a aubree tradition: Anabaptist    
   [] Supported by a aubree community:        
   [] Claims no spiritual orientation:       
   [] Seeking spiritual identity:            
   [] Adheres to an individual form of spirituality:       
   [] Not able to assess:                   
 
    
Identified resources for coping:  
   [x] Prayer                           
   [] Music                  [] Guided Imagery [x] Family/friends                 [] Pet visits [] Devotional reading                         [] Unknown 
   [] Other:                                          
 
 
Interventions offered during this visit: (See comments for more details) Patient Interventions: Affirmation of emotions/emotional suffering, Catharsis/review of pertinent events in supportive environment, Iconic (affirming the presence of God/Higher Power), Initial/Spiritual assessment, patient floor, Prayer (actual), Prayer (assurance of) Family/Friend(s): Affirmation of emotions/emotional suffering, Iconic (affirming the presence of God/Higher Power), Initial Assessment, Life review/legacy, Normalization of emotional/spiritual concerns Plan of Care: 
 
 [x] Support spiritual and/or cultural needs  
 [] Support AMD and/or advance care planning process    
 [] Support grieving process 
 [] Coordinate Rites and/or Rituals  
 [] Coordination with community clergy [] No spiritual needs identified at this time [] Detailed Plan of Care below (See Comments)  [] Make referral to Music Therapy 
[] Make referral to Pet Therapy    
[] Make referral to Addiction services 
[] Make referral to [facility] 
[] Make referral to Spiritual Care Partner 
[] No future visits requested       
[x] Follow up visits as needed Comments: Visited Mr Sharon Maloney in room [phone] for initial Palliative Care spiritual assessment. Mr Sharon Maloney was lying quietly in bed and his daughter was at his bedside; patient and family appeared in good spirits. Provided active listening as Mr Sharon Maloney shared he had been admitted to [address] [place] [address] last night and felt things were going pretty good; he said the nurses had really been attending to him well. Mr Sharon Maloney said that he was [facility] and his only request was for prayer. Had prayer as requested and assured patient and daughter of ongoing  availability for support. : Rev. Hakeem Gerardo. Mary Ortega; [facility], to contact [phone] [address] call: [phone]

## 2019-10-07 NOTE — PROGRESS NOTES
Pharmacist Note - Vancomycin Dosing Consult provided for this 80 y.o. male for indication of HAP. Antibiotic regimen(s): Vanc + Zosyn Recent Labs 10/06/19 
1729 WBC 11.8* CREA 1.31* BUN 34* Frequency of BMP: daily x 3 Height: 177.8 cm Weight: 61.8 kg Est CrCl: 31.5 ml/min; UO: -- ml/kg/hr Temp (24hrs), Av.9 °F (37.7 °C), Min:98.6 °F (37 °C), Max:101.2 °F (38.4 °C) Cultures: 
10/6 blood - pending Goal trough = 15 - 20 mcg/mL Therapy will be initiated with a loading dose of 1500 mg IV x 1 to be followed by a maintenance dose of 1000 mg IV every 24 hours. Pharmacy to follow patient daily and order levels / make dose adjustments as appropriate.

## 2019-10-07 NOTE — PROGRESS NOTES
JYOTI 
1.  PT/ OT evals are pending 2. Patient has been at [facility] at discharge on 09/10/2019. 
3.  Patient will need [facility] stretcher at discharge. CM will need to follow. PATIENT IS A SOUND BUNDLE Reason for Readmission:     pnemonia RRAT Score and Risk Level:     9/ low Level of Readmission:    low Care Conference scheduled:   TBD/ unsure of needs and will need to follow Resources/supports as identified by patient/family:    Decline in health/ will need to follow Top Challenges facing patient (as identified by patient/family and CM):   decline in health Finances/Medication cost?     Per daughter, patient does not have concerns with paying for medications or financial concerns. Transportation      [facility] stretcher Support system or lack thereof? Unable to assess with patient at this point Living arrangements? Patient lives alone, but daughters are very involved. Daughter Nadeem Eason, [phone]. Mickie Rider, daughter reachable at [phone] Self-care/ADLs/Cognition? Prior to admission, patient has required assistance with ADL, IADLS and self care. Patient was confused in answering questions in this initial assessment. This CM deferred questions to daughter. Current Advanced Directive/Advance Care Plan:  Full code status/ Advanced care plan no on file Plan for utilizing home health:   Per daughter Nadeem Eason [phone] patient discharged from [facility] 10/2/2019 and they arranged [facility] with Kaylynn KIRBY. Transition of Care Plan:    Based on readmission, the patient's previous Plan of Care 
 has been evaluated and/or modified. The current Transition of Care Plan is: This CM met with patient and david at bedside and explained CM role in discharge planning. Patient was discharge to [facility] 09/10/2019. Per kip, SNF arranged [facility] services. CM will need to follow.   PT/ OT evals are pending. Transition of Care plan TBD. Care Management Interventions PCP Verified by CM: Yes Last Visit to PCP: 10/09/18 Palliative Care Criteria Met (RRAT>21 & CHF Dx)?: No 
Mode of Transport at Discharge: ALS Transition of Care Consult (CM Consult): (TBD) Thanghart Signup: No 
Discharge Durable Medical Equipment: No 
Physical Therapy Consult: Yes Occupational Therapy Consult: Yes Speech Therapy Consult: No 
Current Support Network: Own Home, Lives Alone Confirm Follow Up Transport: Family Plan discussed with Pt/Family/Caregiver: Yes Discharge Location Discharge Placement: (TBD) SHONA De 
15:06

## 2019-10-07 NOTE — ACP (ADVANCE CARE PLANNING)
Advance Care Planning Note Name: Gerard Huerta YOB: 1927 MRN: [id number] Admission Date: 10/6/2019  5:14 PM 
 
Date of discussion: 10/6/2019 Active Diagnoses: 
 
Hospital Problems  Date Reviewed: 7/2/2015 Codes Class Noted POA Pneumonia ICD-10-CM: J18.9 ICD-9-CM: 158  10/6/2019 Unknown These active diagnoses are of sufficient risk that focused discussion on advance care planning is indicated in order to allow the patient to thoughtfully consider personal goals of care, and if situations arise that prevent the ability to personally give input, to ensure appropriate representation of their personal desires for different levels and aggressiveness of care. Discussion:  
 
Persons present and participating in discussion: Giulia Hoang MD, Discussion: The code status with the patient , offered all options including Full code , DNR/DNI and partial code , explained all this modalities in detail , all questions were answered and patient choose to be Full code Time Spent:  
 
Total time spent face-to-face in education and discussion: 19  minutes.   
 
Gregoria Hoang MD 
10/6/2019 
9:14 PM

## 2019-10-07 NOTE — PROGRESS NOTES
Problem: Dysphagia (Adult) Goal: *Acute Goals and Plan of Care (Insert Text) Description Speech Therapy Goals Initiated 10/7/2019 1. Pt will tolerate regular diet/thin liquids with aspiration precautions without adverse effects within 7 days. Outcome: Progressing Towards Goal 
  
SPEECH LANGUAGE PATHOLOGY BEDSIDE SWALLOW EVALUATION Patient: Gerard Huerta (34 y.o. male) Date: 10/7/2019 Primary Diagnosis: Pneumonia [J18.9] Precautions:   Fall, Bed Alarm(dementia) ASSESSMENT : 
Based on the objective data described below, the patient presents with functional oral and likely at least mild pharyngeal dysphagia. Pt with mostly functional swallow, and only exhibited minimal difficulty when pt consumed large sips or sequential sips. Question if this is secondary to an actual dysphagia given pt's elevated risk for prandial aspiration of dementia, or if this is presbyphagia. Regardless, pt appears safe to continue baseline diet as outlined below. Pt will continue to be at chronic risk for aspiration given hx of dementia. Patient will benefit from skilled intervention to address the above impairments. Patients rehabilitation potential is considered to be Fair Factors which may influence rehabilitation potential include: ? None noted ? Mental ability/status ? Medical condition ? Home/family situation and support systems ? Safety awareness ? Pain tolerance/management ? Other: PLAN : 
Recommendations and Planned Interventions: 
--regular diet/thin liquids 
--no straws 
--small sips 
--fully upright during meals 
--MBS not needed at this time Frequency/Duration: Patient will be followed by speech-language pathology 2 times a week to address goals. Discharge Recommendations: To Be Determined SUBJECTIVE:  
Patient stated, \"I'm so impressed with myself that I am almost 89 and don't even look it. I always stayed very busy. \" OBJECTIVE:  
 
Past Medical History:  
Diagnosis Date CAD (coronary artery disease) DDD (degenerative disc disease), lumbar Sciatica Past Surgical History:  
Procedure Laterality Date HX CORONARY ARTERY BYPASS GRAFT Diet prior to admission: Regular diet/thin liquids Current Diet:  Regular diet/thin liquids- held until SLP sees Cognitive and Communication Status: 
Neurologic State: Alert, Confused Orientation Level: Oriented to person, Disoriented to place, Disoriented to situation, Disoriented to time Cognition: Memory loss, Decreased command following Perception: Appears intact Perseveration: No perseveration noted Safety/Judgement: Fall prevention, Decreased insight into deficits Oral Assessment: 
Oral Assessment Labial: No impairment Dentition: Natural;Intact Oral Hygiene: oral mucosa moist and clear of secretions Lingual: No impairment Velum: No impairment Mandible: No impairment P.O. Trials: 
Patient Position: upright in bed Vocal quality prior to P.O.: No impairment Consistency Presented: Thin liquid;Puree; Solid How Presented: Self-fed/presented;Cup/sip;Spoon Bolus Acceptance: No impairment Bolus Formation/Control: No impairment Propulsion: No impairment Oral Residue: None Initiation of Swallow: Delayed (# of seconds)(Likely delayed given presbyphagia) Laryngeal Elevation: Functional 
Aspiration Signs/Symptoms: Change vocal quality(Wet vocal quality following sequential sips) Pharyngeal Phase Characteristics: Double swallow(with sequential sips) Effective Modifications: Small sips and bites Cues for Modifications: None Oral Phase Severity: No impairment Pharyngeal Phase Severity : Mild NOMS:  
The NOMS functional outcome measure was used to quantify this patient's level of swallowing impairment. Based on the NOMS, the patient was determined to be at level 5 for swallow function NOMS Swallowing Levels: 
Level 1 (CN): NPO Level 2 (CM): NPO but takes consistency in therapy Level 3 (CL): Takes less than 50% of nutrition p.o. and continues with nonoral feedings; and/or safe with mod cues; and/or max diet restriction Level 4 (CK): Safe swallow but needs mod cues; and/or mod diet restriction; and/or still requires some nonoral feeding/supplements Level 5 (CJ): Safe swallow with min diet restriction; and/or needs min cues Level 6 (CI): Independent with p.o.; rare cues; usually self cues; may need to avoid some foods or needs extra time Level 7 (CH): Independent for all p.o. NADINE. (2003). National Outcomes Measurement System (NOMS): Adult Speech-Language Pathology User's Guide. Pain: 
Pain Scale 1: Numeric (0 - 10) Pain Intensity 1: 0 After treatment:  
?            Patient left in no apparent distress sitting up in chair ? Patient left in no apparent distress in bed ? Call bell left within reach ? Nursing notified ? Caregiver present ? Bed alarm activated COMMUNICATION/EDUCATION:  
The patients plan of care including recommendations, planned interventions, and recommended diet changes were discussed with: Registered Nurse and Physician. ? Posted safety precautions in patient's room. ? Patient/family have participated as able in goal setting and plan of care. ?            Patient/family agree to work toward stated goals and plan of care. ?            Patient understands intent and goals of therapy, but is neutral about his/her participation. ? Patient is unable to participate in goal setting and plan of care. Thank you for this referral. 
NOHEMI Brown Time Calculation: 17 mins

## 2019-10-07 NOTE — PROGRESS NOTES
Problem: Mobility Impaired (Adult and Pediatric) Goal: *Acute Goals and Plan of Care (Insert Text) Description FUNCTIONAL STATUS PRIOR TO ADMISSION: Baseline unknown-suspect assist for ADLs and Jase-supervision for ambulation with RW (?). HOME SUPPORT PRIOR TO ADMISSION: Pt lives with his DTR per RN. Physical Therapy Goals Initiated 10/7/2019 1. Patient will move from supine to sit and sit to supine  in bed with minimal assistance/contact guard assist within 7 day(s). 2.  Patient will transfer from bed to chair and chair to bed with minimal assistance/contact guard assist using the least restrictive device within 7 day(s). 3.  Patient will perform sit to stand with minimal assistance/contact guard assist within 7 day(s). 4.  Patient will ambulate with supervision/set-up for 50 feet with the least restrictive device within 7 day(s). 5.  PT to assess stairs as appropriate. Outcome: Progressing Towards Goal 
 PHYSICAL THERAPY EVALUATION Patient: Ju Sanchez (1 y.o. male) Date: 10/7/2019 Primary Diagnosis: Pneumonia [J18.9] Precautions:  Fall, Bed Alarm(dementia) ASSESSMENT Based on the objective data described below, the patient presents with AMS from dementia, sequencing difficulties, generalized weakness, incoordination, balance deficits, and endurance limitations; however, suspect pt is functioning near his baseline status (unable to find his DTR's information in chart for true history and pt likely a poor historian). Pt reports living with his wife and being independent with ADLs, etc... Pt also describes his reason for admission as fall with L hip fracture (which occurred 9/3/19). Anticipate pt will be appropriate to return home with HHPT and 24/7 family support. If this is not available, recommend SNF and potentially a change in permament living situation.  
 
Current Level of Function Impacting Discharge (mobility/balance): upwards of moderate assistance with RW for safety x1 Functional Outcome Measure: The patient scored 30/100 on the Barthel outcome measure which is indicative of significant impairment for ADLs. Other factors to consider for discharge: pt with dementia and fall hx with +fracture Patient will benefit from skilled therapy intervention to address the above noted impairments. PLAN : 
Recommendations and Planned Interventions: bed mobility training, transfer training, gait training, therapeutic exercises, patient and family training/education and therapeutic activities Frequency/Duration: Patient will be followed by physical therapy:  5 times a week to address goals. Recommendation for discharge: (in order for the patient to meet his/her long term goals) Physical therapy at least 2 days/week in the home with 24/7 assistance vs SNF This discharge recommendation: 
Has not yet been discussed the attending provider and/or case management Equipment recommendations for successful discharge (if) home: pending home set-up info SUBJECTIVE:  
Patient stated You should go see her, she would love to see you again. ..  Pt thinks he knows himself and wife from personal relationship. .. OBJECTIVE DATA SUMMARY:  
HISTORY:   
Past Medical History:  
Diagnosis Date CAD (coronary artery disease) DDD (degenerative disc disease), lumbar Sciatica Past Surgical History:  
Procedure Laterality Date HX CORONARY ARTERY BYPASS GRAFT Personal factors and/or comorbidities impacting plan of care: supportive DTR (?) not listed on facesheet EXAMINATION/PRESENTATION/DECISION MAKING:  
Critical Behavior: 
Neurologic State: Alert, Confused Orientation Level: Disoriented to place, Disoriented to situation, Disoriented to time Cognition: Follows commands, Impaired decision making, Memory loss Safety/Judgement: Fall prevention, Decreased insight into deficits Hearing: Auditory Auditory Impairment: None Skin: L hip dressing C/D/I Edema: slight LLE edema vs RLE Range Of Motion: 
AROM: Generally decreased, functional 
  
  
  
  
  
  
  
Strength:   
Strength: Generally decreased, functional 
  
  
  
  
  
  
Tone & Sensation:  
Tone: Normal 
  
  
  
  
Sensation: Intact Coordination: 
Coordination: Generally decreased, functional 
Vision:  
 Pt asking for his glasses-unable to find them in pt's room Functional Mobility: 
Bed Mobility: 
  
Supine to Sit: Moderate assistance; Additional time(assist for sequencing, LLE movement, and trunk elevation) Sit to Supine: (NT; OOB to chair) Scooting: Contact guard assistance; Additional time(cues for task completion) Transfers: 
Sit to Stand: Additional time;Minimum assistance(cues for hand placement, pt remains pulling from RW) Stand to Sit: Additional time; Moderate assistance(cues for alignment, reach back (did not), and assist with descent required) Balance:  
Sitting: Intact Standing: Impaired; Without support Standing - Static: Fair;Constant support Standing - Dynamic : Fair;Constant support Ambulation/Gait Training: 
Distance (ft): 22 Feet (ft)(x2) 
Assistive Device: Gait belt;Walker, rolling Ambulation - Level of Assistance: Contact guard assistance;Minimal assistance; Additional time Gait Abnormalities: Decreased step clearance(delayed step through pattern; cues for postural extension) Left Side Weight Bearing: As tolerated Stance: Left decreased Speed/Milka: Slow;Pace decreased (<100 feet/min) Step Length: Right shortened;Left shortened Functional Measure: 
Barthel Index: 
 
Bathin Bladder: 5 Bowels: 5 Groomin Dressin Feedin Mobility: 0 Stairs: 0 Toilet Use: 5 Transfer (Bed to Chair and Back): 5 Total: 30/100 The Barthel ADL Index: Guidelines 1.  The index should be used as a record of what a patient does, not as a record of what a patient could do. 2. The main aim is to establish degree of independence from any help, physical or verbal, however minor and for whatever reason. 3. The need for supervision renders the patient not independent. 4. A patient's performance should be established using the best available evidence. Asking the patient, friends/relatives and nurses are the usual sources, but direct observation and common sense are also important. However direct testing is not needed. 5. Usually the patient's performance over the preceding 24-48 hours is important, but occasionally longer periods will be relevant. 6. Middle categories imply that the patient supplies over 50 per cent of the effort. 7. Use of aids to be independent is allowed. Asha Chen., Barthel, DJenW. (0925). Functional evaluation: the Barthel Index. [address] [facility] (14)2. Frankey Bohr ronnie EMEKA Brown, Mary [place]., Marichuy Watt., [place], [address] [place] [address] (1999). Measuring the change indisability after inpatient rehabilitation; comparison of the responsiveness of the Barthel Index and Functional [place] Measure. Journal of Neurology, Neurosurgery, and Psychiatry, 66(4), 513-685. Enoch Kessler, N.J.A, JEOVANNY Ornelas.DEVIKA, & Jaylyn Gan, M.A. (2004.) Assessment of post-stroke quality of life in cost-effectiveness studies: The usefulness of the Barthel Index and the EuroQoL-5D. [facility], 13, 558-18 Pain Rating: 
None Activity Tolerance:  
Fair Please refer to the flowsheet for vital signs taken during this treatment. After treatment patient left in no apparent distress:  
Sitting in chair, Call bell within reach and Bed / chair alarm activated COMMUNICATION/EDUCATION:  
The patients plan of care was discussed with: Registered Nurse.  
 
Fall prevention education was provided and the patient/caregiver indicated understanding., Patient/family have participated as able in goal setting and plan of care. and Patient/family agree to work toward stated goals and plan of care. Thank you for this referral. 
Suhas Gonzalez Time Calculation: 21 mins

## 2019-10-07 NOTE — PROGRESS NOTES
SLP Contact Note SLP evaluation complete. Pt without any significant difficulty with breakfast tray. Therefore, feel pt safe to continue regular diet and thin liquids with small, single sips. No MBS indicated at this time, however, will follow-up x1 to ensure tolerance of PO diet. Thank you, Efren Ambrosio M.Ed, CCC-SLP Speech-Language Pathologist

## 2019-10-07 NOTE — PROGRESS NOTES
Day #1 of Zosyn Indication:  pneumonia Current regimen:  4.5 gm Q6hr Abx regimen: zosyn+van Recent Labs 10/06/19 
1729 WBC 11.8* CREA 1.31* BUN 34* Est CrCl: >20 ml/min; Temp (24hrs), Av.9 °F (37.7 °C), Min:98.6 °F (37 °C), Max:101.2 °F (38.4 °C) Cultures: 10/6 blood in process Plan: Change to 3.375 gm q8hr  (extended infusion) per renal dosing protocol

## 2019-10-08 NOTE — CONSULTS
Palliative Medicine Consult Ilia: [phone] ([phone]) Patient Name: Diana Thurman YOB: 1927 Date of Initial Consult: 10/8/19 Reason for Consult: care decisions Requesting Provider: Dr. Mayito Louise Primary Care Physician: Adeola Tierney MD 
 
 SUMMARY:  
Diana Thurman is a 80 y.o. with a past history of CAD s/p CABG, lumbar DDD/sciatica, 19 L hip hemiarthroplasty for displaced femoral neck fx went to [facility] SNF (9/10/19-10/2/19) , who was admitted on 10/6/2019 from home with a diagnosis of fever, cough, weakness. Current medical issues leading to Palliative Medicine involvement include: Patient was home from SNF after hip fx repair for just 3 days when he developed fever/ SOB, on abx for pneumonia. Also has drainage from hip wound that started 2 weeks ago, alone with more pain in L hip with standing. Patient noted to have some confusion at times during hospitalization. Patient's wife  22 years ago. He has two adult daughters, both live in [place]. Daughter Javad Bonner is staying in [place] with patient now. Family hopes to get patient well enough to travel to [place] and stay near daughters there for the winter. Patient very independent prior to hip fx in September. Loved to be outside cutting wood for the wood stove, cutting grass, doing yard work. He is retired from work for [employer], engineering, and real estate sales. Just this year he turned over checking account/bill paying to his daughters. Prior to hip fracture he was independent in [facility] [employer], but doing less around the house in [facility]. PALLIATIVE DIAGNOSES:  
1. Pneumonia, fever 2. L hip wound drainage 3. L hip pain 4. Anemia 5. Debility, deconditioning 6. Mild cognitive changes, hospital delirium PLAN:  
1. Palliative Medicine services introduced to patient and daughter Javad Bonner at bedside as added layer of support.  
1. We talked about acute issues, they are anxious to hear from ortho what they think of the hip xray, wonder if it's healing properly. He's having pain in that hip, which is new (didn't have much post op pain) 2. Patient and daughters goal is for patient to be able to return to independent living, suggested this is something to hope for but perhaps not realistic in light of all the changes in the past couple of months. Patient expresses grief at the change in his health status   Dtr understands he needs 24/7 supervision for now. They would consider SNF again if recommended -- I would recommend this after this hospital stay. [employer] hoping he can regain enough strength to travel to [place] and be there for the winter near daughters. [employer]  has a broken back and is limited in ability to help her dad. 3. Patient has AMD at home (he thinks)- [employer] will look for it. Let her know we don't have copy in our system. 4. Education provided about code status. We talked about poor outcomes of code blue in setting of advanced age/ frailty. After very detailed discussion, patient stated he would want to sign a DDNR for himself. Patient seems to have insight into this discussion, but he does wander in conversation at times (he is fixated on a conversation from yesterday about his daily aspirin). [employer] is not ready for patient to sign this, wants to talk with him alone and make sure he really understands it and will talk with her sister. Education provided that this is not a do not treat order, that it's only talking about time of death. Patient and [employer] appreciative of this information, they were encouraged to discuss as family and they have our number to call if they want to sign the form. (or if at SNF when decide- attending there can sign with him). I did NOT change code status in computer today as patient has some mild confusion, and family wants to discuss amongst themselves before DNR order is placed.   
5. Pain - recommend scheduled tylenol for now, may need to add something else if pain isn't better controlled with ambulation. (hurts \"a lot\" to walk, no pain with sitting)   Xray/ortho eval pending 6. Made plan to check back in tomorrow. 2. Initial consult note routed to primary continuity provider and/or primary health care team members 3. Communicated plan of care with: Palliative Justin CARRION 192 Team 
 
 GOALS OF CARE / TREATMENT PREFERENCES:  
 
GOALS OF CARE: 
Patient/Health Care Proxy Stated Goals: Prolong life TREATMENT PREFERENCES:  
Code Status: Full Code Advance Care Planning: 
[] The [facility] Interdisciplinary Team has updated the ACP Navigator with Johana and Patient Capacity Advance Care Planning 10/7/2019 Confirm Advance Directive None Medical Interventions: Full interventions Other Instructions: Other: As far as possible, the palliative care team has discussed with patient / health care proxy about goals of care / treatment preferences for patient. HISTORY:  
 
History obtained from: chart CHIEF COMPLAINT: fever, cough HPI/SUBJECTIVE: The patient is:  
[x] Verbal and participatory [] Non-participatory due to:  
 
80year old male quite independent prior to hip fracture in September (see above) Home from SNF with his daughter visiting to care for him -- he became weaker, developed cough and fever Had made pretty good progress at SNF, walking with rollator. He notes pain in left hip that was new last couple of weeks and also drainage at the dressing site. Clinical Pain Assessment (nonverbal scale for severity on nonverbal patients):  
Clinical Pain Assessment Severity: 0 Duration: for how long has pt been experiencing pain (e.g., 2 days, 1 month, years) Frequency: how often pain is an issue (e.g., several times per day, once every few days, constant) FUNCTIONAL ASSESSMENT:  
 
Palliative Performance Scale (PPS): PPS: 30 
 
 
 PSYCHOSOCIAL/SPIRITUAL SCREENING:  
 
 Palliative IDT has assessed this patient for cultural preferences / practices and a referral made as appropriate to needs (Cultural Services, Patient Advocacy, Ethics, etc.) Any spiritual / Caodaism concerns: 
[] Yes /  [x] No 
 
Caregiver Burnout: 
[] Yes /  [x] No /  [] No Caregiver Present Anticipatory grief assessment:  
[x] Normal  / [] Maladaptive ESAS Anxiety: Anxiety: 0 
 
ESAS Depression: Depression: 0 REVIEW OF SYSTEMS:  
 
Positive and pertinent negative findings in ROS are noted above in HPI. The following systems were [x] reviewed / [] unable to be reviewed as noted in HPI Other findings are noted below. Systems: constitutional, ears/nose/mouth/throat, respiratory, gastrointestinal, genitourinary, musculoskeletal, integumentary, neurologic, psychiatric, endocrine. Positive findings noted below. Modified ESAS Completed by: provider Fatigue: 2 Drowsiness: 0 Depression: 0 Pain: 0 Anxiety: 0 Nausea: 0 Anorexia: 0 Dyspnea: 0 Constipation: No  
     
 
 
 PHYSICAL EXAM:  
 
From RN flowsheet: 
Wt Readings from Last 3 Encounters:  
10/07/19 136 lb (61.7 kg) 09/09/19 136 lb 3.2 oz (61.8 kg) 07/02/15 140 lb 3.2 oz (63.6 kg) Blood pressure 136/64, pulse 65, temperature 97.7 °F (36.5 °C), resp. rate 14, height 5' 10\" (1.778 m), weight 136 lb (61.7 kg), SpO2 99 %. Pain Scale 1: Numeric (0 - 10) Pain Intensity 1: 6 Pain Onset 1: now Pain Location 1: Hip Pain Orientation 1: Left Pain Description 1: Aching Pain Intervention(s) 1: Medication (see MAR) Last bowel movement, if known:  
 
Constitutional:thin elderly male,  sitting up in chair wrapped in blankets. Eyes: pupils equal, anicteric No respiratory distress Engaging in conversation Speech slow, takes extra time to come out with answers Insight a bit diminished, fixates on certain things HISTORY:  
 
Principal Problem: 
  Pneumonia (10/6/2019) Past Medical History:  
Diagnosis Date  CAD (coronary artery disease)  DDD (degenerative disc disease), lumbar  Sciatica Past Surgical History:  
Procedure Laterality Date  HX CORONARY ARTERY BYPASS GRAFT No family history on file. History reviewed, no pertinent family history. Social History Tobacco Use  Smoking status: Never Smoker  Smokeless tobacco: Never Used Substance Use Topics  Alcohol use: Yes No Known Allergies Current Facility-Administered Medications Medication Dose Route Frequency  hydrALAZINE (APRESOLINE) 20 mg/mL injection 10 mg  10 mg IntraVENous Q6H PRN  
 acetaminophen (TYLENOL) tablet 650 mg  650 mg Oral Q8H  
 levoFLOXacin (LEVAQUIN) 750 mg in D5W IVPB  750 mg IntraVENous Q48H  
 aspirin delayed-release tablet 325 mg  325 mg Oral DAILY  calcium-vitamin D (OS-OLEKSANDR) 500 mg-200 unit tablet  1 Tab Oral TID WITH MEALS  docusate sodium (COLACE) capsule 100 mg  100 mg Oral BID PRN  
 metoprolol tartrate (LOPRESSOR) tablet 50 mg  50 mg Oral BID  sodium chloride (NS) flush 5-40 mL  5-40 mL IntraVENous Q8H  
 sodium chloride (NS) flush 5-40 mL  5-40 mL IntraVENous PRN  
 morphine injection 1 mg  1 mg IntraVENous Q4H PRN  
 ondansetron (ZOFRAN) injection 4 mg  4 mg IntraVENous Q4H PRN  
 enoxaparin (LOVENOX) injection 40 mg  40 mg SubCUTAneous Q24H  piperacillin-tazobactam (ZOSYN) 3.375 g in 0.9% sodium chloride (MBP/ADV) 100 mL  3.375 g IntraVENous Q8H  
 
 
 
 LAB AND IMAGING FINDINGS:  
 
Lab Results Component Value Date/Time WBC 10.3 10/07/2019 02:03 AM  
 HGB 8.8 (L) 10/07/2019 02:03 AM  
 PLATELET 626 61/94/6856 02:03 AM  
 
Lab Results Component Value Date/Time Sodium 140 10/08/2019 04:32 AM  
 Potassium 4.0 10/08/2019 04:32 AM  
 Chloride 108 10/08/2019 04:32 AM  
 CO2 25 10/08/2019 04:32 AM  
 BUN 30 (H) 10/08/2019 04:32 AM  
 Creatinine 1.29 10/08/2019 04:32 AM  
 Calcium 8.6 10/08/2019 04:32 AM  
 Magnesium 1.9 10/06/2019 05:29 PM  
  
Lab Results Component Value Date/Time AST (SGOT) 26 10/06/2019 05:29 PM  
 Alk. phosphatase 156 (H) 10/06/2019 05:29 PM  
 Protein, total 6.6 10/06/2019 05:29 PM  
 Albumin 2.7 (L) 10/06/2019 05:29 PM  
 Globulin 3.9 10/06/2019 05:29 PM  
 
Lab Results Component Value Date/Time INR 1.2 (H) 09/03/2019 01:21 PM  
 Prothrombin time 12.2 (H) 09/03/2019 01:21 PM  
 aPTT 29.9 05/24/2009 07:50 AM  
  
Lab Results Component Value Date/Time Ferritin 206 09/27/2010 08:59 AM  
  
No results found for: PH, PCO2, PO2 No components found for: [place] No results found for: CPK, CKMB Total time: 55min Counseling / coordination time, spent as noted above: 35 
> 50% counseling / coordination?: yes Discussing current medical issues, option for SNF again, travel to [place] [facility], code status education Prolonged service was provided for  []30 min   []75 min in face to face time in the presence of the patient, spent as noted above. Time Start:  
Time End:  
Note: this can only be billed with 40013 (initial) or 67470 (follow up). If multiple start / stop times, list each separately.

## 2019-10-08 NOTE — PROGRESS NOTES
Problem: Dysphagia (Adult) Goal: *Acute Goals and Plan of Care (Insert Text) Description Speech Therapy Goals Initiated 10/7/2019 1. Pt will tolerate regular diet/thin liquids with aspiration precautions without adverse effects within 7 days. Outcome: Resolved/Met SPEECH LANGUAGE PATHOLOGY DYSPHAGIA TREATMENT/DISCHARGE Patient: Royal Bhat (5 y.o. male) Date: 10/8/2019 Diagnosis: Pneumonia [J18.9] Pneumonia Precautions: Fall, Bed Alarm(dementia) ASSESSMENT: 
Pt appears to be tolerating diet without any adverse effects or overt difficulty. Although pt still continues with some throat clear/wet vocal quality with sequential sips of thin liquids, feel pt safe to continue on diet as outlined below. Pt will be at chronic risk for prandial aspiration given hx of dementia, and therefore it will be imperative to monitor chest imaging, WBC elevation, and temperature (particularly upon discharge). No further acute SLP needs anticipated. Will sign-off. Please reconsult should SLP be of any assistance. PLAN: 
--regular diet/thin liquids 
--must take small, single sips 
--fully upright during meals 
--alternate liquids/solids Patient will be discharged from acute skilled speech therapy at this time. Rationale for discharge: 
?      Goals Achieved ? Artist Angela ? Patient not participating in therapy ? Other: 
Discharge Recommendations:  None SUBJECTIVE:  
Patient stated, \"I'm doing just fine. OBJECTIVE:  
Cognitive and Communication Status: 
Neurologic State: Alert Orientation Level: Disoriented to time, Oriented to person Cognition: Memory loss, Follows commands Perception: Appears intact Perseveration: No perseveration noted Safety/Judgement: Decreased awareness of environment, Decreased awareness of need for assistance, Decreased awareness of need for safety Dysphagia Treatment: P.O. Trials: 
Patient Position: Upright in bed Vocal quality prior to P.O.: (Intermittently wet but mostly functional) Consistency Presented: Solid; Thin liquid How Presented: Self-fed/presented;Cup/sip;Straw;Successive swallows;Spoon Bolus Acceptance: No impairment Bolus Formation/Control: No impairment Propulsion: No impairment Oral Residue: None Initiation of Swallow: Delayed (# of seconds) Laryngeal Elevation: Decreased(likely functional for age, however) Aspiration Signs/Symptoms: Clear throat(after sequential sips of thins) Pharyngeal Phase Characteristics: No impairment, issues, or problems NOMS:  
The NOMS functional outcome measure was used to quantify this patient's level of swallowing impairment. Based on the NOMS, the patient was determined to be at level 6 for swallow function NOMS Swallowing Levels: 
Level 1 (CN): NPO Level 2 (CM): NPO but takes consistency in therapy Level 3 (CL): Takes less than 50% of nutrition p.o. and continues with nonoral feedings; and/or safe with mod cues; and/or max diet restriction Level 4 (CK): Safe swallow but needs mod cues; and/or mod diet restriction; and/or still requires some nonoral feeding/supplements Level 5 (CJ): Safe swallow with min diet restriction; and/or needs min cues Level 6 (CI): Independent with p.o.; rare cues; usually self cues; may need to avoid some foods or needs extra time Level 7 (CH): Independent for all p.o. NADINE. (2003). National Outcomes Measurement System (NOMS): Adult Speech-Language Pathology User's Guide. Pain: 
Pain Scale 1: Numeric (0 - 10) Pain Intensity 1: 6 Pain Location 1: Hip After treatment:  
?                Patient left in no apparent distress sitting up in chair ? Patient left in no apparent distress in bed 
? Call bell left within reach ? Nursing notified ? Caregiver present ? Bed alarm activated COMMUNICATION/EDUCATION:  
 
 The patients plan of care including recommendations, planned interventions, and recommended diet changes were discussed with: Registered Nurse. ? Posted safety precautions in patient's room. NOHEMI Cosby Time Calculation: 12 mins

## 2019-10-08 NOTE — PROGRESS NOTES
Problem: Mobility Impaired (Adult and Pediatric) Goal: *Acute Goals and Plan of Care (Insert Text) Description FUNCTIONAL STATUS PRIOR TO ADMISSION: Baseline unknown-suspect assist for ADLs and Jase-supervision for ambulation with RW (?). HOME SUPPORT PRIOR TO ADMISSION: Pt lives with his DTR per RN. Physical Therapy Goals Initiated 10/7/2019 1. Patient will move from supine to sit and sit to supine  in bed with minimal assistance/contact guard assist within 7 day(s). 2.  Patient will transfer from bed to chair and chair to bed with minimal assistance/contact guard assist using the least restrictive device within 7 day(s). 3.  Patient will perform sit to stand with minimal assistance/contact guard assist within 7 day(s). 4.  Patient will ambulate with supervision/set-up for 50 feet with the least restrictive device within 7 day(s). 5.  PT to assess stairs as appropriate. Outcome: Progressing Towards Goal 
 PHYSICAL THERAPY TREATMENT Patient: Pepper Reinoso (60 y.o. male) Date: 10/8/2019 Diagnosis: Pneumonia [J18.9] Pneumonia Precautions: Fall, Bed Alarm(dementia) Chart, physical therapy assessment, plan of care and goals were reviewed. ASSESSMENT Patient continues with skilled PT services and is progressing towards goals. Pt seen this morning following RN clearance. Pt resting in bed in NAD with easy arousal to voice. Pt pleasant and motivated, agreeable to bed mobility, transfers, gait, toileting in bathroom, and OOB to chair for lunch time. Pt continues to require assist x1 with DME for all functional mobility. He remains severely confused, antalgic (which increased with walking (!)), with decreased balance, coordination, and requires increased cues for some sequencing during task completion. He has decreased insight into his deficits and requires a chair alarm at this time.   Orientation not assessed today, however, pt disoriented to situation, place, and time per OT. Discussed with OT, in agreement pt is not safe to return home. Recommend SNF at this time and a change in permanent living situation. Per chart, DTR has suffered a vertebral fx attempting to care for pt in the home. DTR and OT with lengthy discussion regarding changes in pt since hip surgery and inappropriateness of pt being alone again. Of note: per chart, various staff reporting pt too \"weak\" to mobilize in afternoons and night. Could pt be sundowning (?) He is clearly strong enough during therapy sessions to mobilize with PT/RN during the day. Current Level of Function Impacting Discharge (mobility/balance): upwards of Jase with DME Other factors to consider for discharge: Pt's DTRs live in [facility] (?) and he was living independently prior to L hip fx; pt not safe to return home and unlikely he will be safe to live at home, alone, again. PLAN : 
Patient continues to benefit from skilled intervention to address the above impairments. Continue treatment per established plan of care. to address goals. Recommendation for discharge: (in order for the patient to meet his/her long term goals) Therapy up to 5 days/week in SNF setting This discharge recommendation: 
Has been made in collaboration with the attending provider and/or case management Equipment recommendations for successful discharge (if) home: NA   
 
 
SUBJECTIVE:  
Patient stated Could I use the bathroom?  OBJECTIVE DATA SUMMARY:  
Critical Behavior: 
Neurologic State: Alert Orientation Level: Oriented to person, Oriented to place, Disoriented to situation, Disoriented to time Cognition: Memory loss, Impaired decision making Safety/Judgement: Decreased awareness of environment, Decreased awareness of need for assistance, Decreased awareness of need for safety, Decreased insight into deficits Functional Mobility Training: 
Bed Mobility: 
Supine to Sit: Minimum assistance; Additional time(HOB elevated, rail used, cues for sequencing) Sit to Supine: (NT; OOB to chair) Scooting: Minimum assistance; Additional time(increased time and effort for \"feet flat\") Transfers: 
Sit to Stand: Minimum assistance; Additional time Stand to Sit: Additional time;Minimum assistance(cues for aligment, reach back, controlled descent) Balance: 
Sitting: Impaired; Without support Sitting - Static: Good (unsupported) Sitting - Dynamic: Fair (occasional) Standing: Impaired; With support Standing - Static: Constant support; Fair 
Standing - Dynamic : Constant support; Elton Hernandez Ambulation/Gait Training: 
Distance (ft): 35 Feet (ft)(x2 with seated break for toileting) Assistive Device: Gait belt;Walker, rolling Ambulation - Level of Assistance: Contact guard assistance;Minimal assistance; Additional time Gait Abnormalities: Antalgic;Decreased step clearance; Step to gait(pt gait quality declined significantly with increased walking) Left Side Weight Bearing: As tolerated Stance: Left decreased Speed/Milka: Slow;Pace decreased (<100 feet/min) Step Length: Right shortened;Left shortened Pain Rating: 
Pt unable to utilize the VAS scale: states \"Oh, I think the ER is a good idea, don't you? \" when 10/10 explained as such. When \"minimal, moderate, or severe\" scale used, pt recalls minimal L hip pain. Activity Tolerance:  
Fair and requires rest breaks Please refer to the flowsheet for vital signs taken during this treatment. After treatment patient left in no apparent distress:  
Sitting in chair, Call bell within reach and Bed / chair alarm activated COMMUNICATION/COLLABORATION:  
The patients plan of care was discussed with: Registered Nurse Marcella Curran Time Calculation: 15 mins

## 2019-10-08 NOTE — PROGRESS NOTES
Hospitalist Progress Note Zahida Parker MD 
Answering service: [phone] OR [phone] from in house phone Date of Service:  10/8/2019 NAME:  Bonny Medina :  3/18/1927 MRN:  [id number] Admission Summary:  
Patient is a 80year old Male medical history significant for HTN , CAD,and DDD who presents to the Emergency Department accompanied by Daughter via EMS with chief complaint of fever. Patient complains of subjective fever, decreased oral intake  increased weakness after he was discharged from rehab 3 days. 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 He denies chills , Shortness of breath , chest pain , palpitations , leg swelling , syncope or near syncope , headaches , seizures or change in mentation , urinary or bowel complains. 
   
Interval history / Subjective:  
F/u Cough with fever Assessment & Plan:  
 
Possible Bilateral pneumonia , HCAP  
- discharged from rehab 3 days ago  
- in the setting of fever ,leucocytosis and CXR concerning new bibasilar infiltrates - CT chest 10/6 Small bilateral pleural effusions with areas of bilateral lower lobe and lingular atelectasis and bronchiectasis as above. No pulmonary edema or pneumothorax. - On Vanc (10/6--) and Zosyn (10/6--) and  Levaquin (10/6--) - DC Vancomycin today and transition to levofloxacin single therapy today  
- blood culture NGTD  
- DC IVF  
- Cont to monitor  
  
New Bilateral (small) pleural effusion  
- x-ray in 9/3 was unremarkable - No prior CT for comparison - 2/2 CHF ? No echo on file  
-Follow Echo 
- No indication for tapping  
  
Bilateral bronchiectasis - possible  2/2 recurrent aspiration pneumonia  
- Not in acute exacerbation (no significant sputum production) - x-ray in 9/3 was unremarkable - No prior CT for comparison 
  
HTN  
- BP at goal  
 - Cont home med's  
  
CAD with valvular replacement 
- Stable - Cont home ASA , BB Statins Recent L Hip Fracture (recent admission): - X-ray showed left femoral neck fracture 9/3 
- s/p Left hip hemiarthroplasty 9/4/2019 
- Pt may require repeat OR per orthopedics due to ongoing drainage Probable dysphagia  
- likely difficulty of initiation of swallowing - Aspiration precautions - Modified barium swallowing if needed - Bed side swallow evaluation - Speech Therapy and evaluation  
  
Increased Generalized weakness  
- likely from the above  
- PT/OT Code status: FULL CODE. Will consult palliative care DVT prophylaxis: Lovenox PTA: home Baseline: Independent with walker Plan: Continue antibiotics, follow cultures. Likely discharge in 1-2 days Care Plan discussed with: Patient/Family Disposition: TBD Hospital Problems  Date Reviewed: 10/7/2019 Codes Class Noted POA * (Principal) Pneumonia ICD-10-CM: J18.9 ICD-9-CM: 094  10/6/2019 Yes Review of Systems: A comprehensive review of systems was negative except for that written in the HPI. Vital Signs:  
 Last 24hrs VS reviewed since prior progress note. Most recent are: 
Visit Vitals /51 Pulse 70 Temp 97.5 °F (36.4 °C) Resp 15 Ht 5' 10\" (1.778 m) Wt 61.7 kg (136 lb) SpO2 97% BMI 19.51 kg/m² Intake/Output Summary (Last 24 hours) at 10/8/2019 1712 Last data filed at 10/8/2019 0900 Gross per 24 hour Intake 240 ml Output 1475 ml Net -1235 ml Physical Examination:  
 
 
     
Constitutional:  No acute distress, cooperative, pleasant ENT:  Oral mucous moist, oropharynx benign. Resp:  CTA bilaterally. No wheezing/rhonchi/rales. No accessory muscle use CV:  Regular rhythm, normal rate, no murmurs, gallops, rubs GI:  Soft, non distended, non tender. normoactive bowel sounds, no hepatosplenomegaly Musculoskeletal:  No edema, warm, 2+ pulses throughout Neurologic:  Moves all extremities. AAOx3, CN II-XII reviewed Skin:  Good turgor, no rashes or ulcers Data Review:  
 Review and/or order of clinical lab test 
Review and/or order of tests in the radiology section of CPT Review and/or order of tests in the medicine section of CPT Labs:  
 
Recent Labs 10/07/19 
0203 10/06/19 
1729 WBC 10.3 11.8* HGB 8.8* 9.6* HCT 28.0* 30.7*  202 Recent Labs 10/08/19 
4003 10/07/19 
0203 10/06/19 
1729  141 139  
K 4.0 5.1 4.7  111* 107 CO2 25 30 27 BUN 30* 35* 34* CREA 1.29 1.31* 1.31* GLU 91 106* 110* CA 8.6 8.3* 9.2 MG  --   --  1.9 Recent Labs 10/06/19 
1729 SGOT 26 ALT 22 * TBILI 1.0 TP 6.6 ALB 2.7*  
GLOB 3.9 No results for input(s): INR, PTP, APTT, INREXT, INREXT in the last 72 hours. No results for input(s): FE, TIBC, PSAT, FERR in the last 72 hours. No results found for: FOL, RBCF No results for input(s): PH, PCO2, PO2 in the last 72 hours. Recent Labs 10/06/19 
1729 TROIQ 0.08* Lab Results Component Value Date/Time Cholesterol, total 127 02/19/2010 11:45 AM  
 HDL Cholesterol 55 02/19/2010 11:45 AM  
 LDL, calculated 63.6 02/19/2010 11:45 AM  
 Triglyceride 42 02/19/2010 11:45 AM  
 CHOL/HDL Ratio 2.3 02/19/2010 11:45 AM  
 
Lab Results Component Value Date/Time Glucose (POC) 109 (H) 10/06/2019 06:40 PM  
 Glucose (POC) 91 09/04/2019 02:27 PM  
 Glucose (POC) 94 09/03/2019 03:20 PM  
 
Lab Results Component Value Date/Time  Color YELLOW/STRAW 10/06/2019 05:29 PM  
 Appearance CLEAR 10/06/2019 05:29 PM  
 Specific gravity 1.020 10/06/2019 05:29 PM  
 pH (UA) 6.5 10/06/2019 05:29 PM  
 Protein 30 (A) 10/06/2019 05:29 PM  
 Glucose NEGATIVE  10/06/2019 05:29 PM  
 Ketone NEGATIVE  10/06/2019 05:29 PM  
 Bilirubin NEGATIVE  10/06/2019 05:29 PM  
 Urobilinogen 1.0 10/06/2019 05:29 PM  
 Nitrites NEGATIVE  10/06/2019 05:29 PM  
 Leukocyte Esterase NEGATIVE  10/06/2019 05:29 PM  
 Epithelial cells FEW 10/06/2019 05:29 PM  
 Bacteria NEGATIVE  10/06/2019 05:29 PM  
 WBC 0-4 10/06/2019 05:29 PM  
 RBC 0-5 10/06/2019 05:29 PM  
 
 
 
Medications Reviewed:  
 
Current Facility-Administered Medications Medication Dose Route Frequency  hydrALAZINE (APRESOLINE) 20 mg/mL injection 10 mg  10 mg IntraVENous Q6H PRN  
 acetaminophen (TYLENOL) tablet 650 mg  650 mg Oral Q8H  
 levoFLOXacin (LEVAQUIN) 750 mg in D5W IVPB  750 mg IntraVENous Q48H  
 aspirin delayed-release tablet 325 mg  325 mg Oral DAILY  calcium-vitamin D (OS-OLEKSANDR) 500 mg-200 unit tablet  1 Tab Oral TID WITH MEALS  docusate sodium (COLACE) capsule 100 mg  100 mg Oral BID PRN  
 metoprolol tartrate (LOPRESSOR) tablet 50 mg  50 mg Oral BID  sodium chloride (NS) flush 5-40 mL  5-40 mL IntraVENous Q8H  
 sodium chloride (NS) flush 5-40 mL  5-40 mL IntraVENous PRN  
 morphine injection 1 mg  1 mg IntraVENous Q4H PRN  
 ondansetron (ZOFRAN) injection 4 mg  4 mg IntraVENous Q4H PRN  
 enoxaparin (LOVENOX) injection 40 mg  40 mg SubCUTAneous Q24H  piperacillin-tazobactam (ZOSYN) 3.375 g in 0.9% sodium chloride (MBP/ADV) 100 mL  3.375 g IntraVENous Q8H  
 
______________________________________________________________________ EXPECTED LENGTH OF STAY: - - - 
ACTUAL LENGTH OF STAY:          2 
 
            
Juan Vasquez MD

## 2019-10-08 NOTE — PROGRESS NOTES
Patient is confused, too weak to get out of bed, uses urinal. 
 
0452 BP at 182/71. NP, Radha Barrera notified. Order received for Hydralazine (see MAR) 0600 BP at 167/66 Bedside and Verbal shift change report given to Rosanne Chawla (oncoming nurse) by Kylah Red (offgoing nurse).  Report included the following information SBAR, Kardex, Procedure Summary, Intake/Output, MAR, Accordion, Recent Results and Med Rec Status.

## 2019-10-08 NOTE — PROGRESS NOTES
TRANSFER - IN REPORT: 
 
Verbal report received from 81 Patsy Jacinto RN(name) on Marilyn Gray  being received from 3 N(unit) for routine progression of care Report consisted of patients Situation, Background, Assessment and  
Recommendations(SBAR). Information from the following report(s) SBAR, Kardex, ED Summary and MAR was reviewed with the receiving nurse. Opportunity for questions and clarification was provided.

## 2019-10-08 NOTE — PROGRESS NOTES
JYOTI: 
 
Home with resumption of [facility] and daughters support vs. Return to [facility] SNF via [facility] (likely) had recent hip fracture and surgery  and readmitted with [facility] Pt had discharged home on 10/2 and returned home and was not going well at home. Daughter is currently here at hospital and was at bedside and confirmed understanding. Daughter Cintia Ambrocio also now has compression fracture from assisting pt at home. Her other sister Marsha Baugh will arrive next week and their goal is for pt to return to his home in [place] with [facility] and their support until they can get him moved to [place] to be near them and offer support. Currently, would not expect pt to be able to return to independent functioning to live alone as prior to surgery. Cintia Ambrocio cell is [phone]. CM notes patient is a Sound Bundle. Ortho also following due to drainage/pain and possible need for I & D, which may prolong this hospitalization. Referral sent to [facility] via [facility] link as well as [facility] for potential resumption of care. FOC form in chart.    
 
RHONDA Coffey

## 2019-10-08 NOTE — PROGRESS NOTES
Problem: Self Care Deficits Care Plan (Adult) Goal: *Acute Goals and Plan of Care (Insert Text) Description FUNCTIONAL STATUS PRIOR TO ADMISSION: Last week pt was reportedly discharged from SNF to home with daughter assisting (visiting from [place]) and pt was requiring significant physical assistance to stand at night (daughter reportedly with back injury attempting to lift pt at night). HOME SUPPORT: Pt was residing alone in tri-level home (2 daughters reside in [place]). Occupational Therapy Goals Initiated 10/8/2019 1. Patient will perform upper body dressing with supervision/set-up within 7 day(s). 2.  Patient will perform lower body dressing with minimal assistance/contact guard assist within 7 day(s). 3.  Patient will perform grooming standing sink level with minimal assistance/contact guard assist within 7 day(s). 4.  Patient will perform toilet transfers contact guard assist within 7 day(s). 5.  Patient will perform all aspects of toileting with minimal assistance/contact guard assist within 7 day(s). 6.  Patient will participate in upper extremity therapeutic exercise/activities with supervision/set-up for 10 minutes within 7 day(s). Outcome: Not Met OCCUPATIONAL THERAPY EVALUATION Patient: Cornelio Shearer (6 y.o. male) Date: 10/8/2019 Primary Diagnosis: Pneumonia [J18.9] Precautions:  Fall, Bed Alarm(dementia) ASSESSMENT Based on the objective data described below, the patient presents with impaired cognition (memory, insight, safety, orientation), impaired functional reach to feet, L hip pain, impaired balance, generalized weakness related to recent medical course. Of note, pt was just discharged home from SNF (last Thursday) after approximately 3 weeks in SNF rehab s/p hemiarthroplasty s/p fall with fracture. Pt oriented x 2 and unable to produce accurate month with max verbal cues.  Pt appears to have some masking behaviors for his cognitive deficits (calls daughter his \"mother like\" and unclear if pt does recognize his daughter/ her relation). OT spoke at length with daughter regarding rehab, pts presentation and discharge options. Daughter Grabiel Pang) and sister reside in [place]. Zeus Anguiano reportedly with back injury (? Vertebral fracture related to attempting to lift pt in his home last week). Goal is to transition pt to [place] residing near daughters. Initially Zeus Anguiano verbalized transition pt to one story home (alone) that sister owns. OT recommends 24 hour assistance for pt due to cognitive deficits. Pt will benefit from rehab stay next level of care then transition to 24 hour assist environment in [place] ([facility] vs 1 story house with 24 hour assist pending what daughters are able to arrange). Current Level of Function Impacting Discharge (ADLs/self-care): Up to moderate A. Functional Outcome Measure: The patient scored 30/100 on the Barthel Index outcome measure which is indicative of significant functional impairment/ caregiver dependence. Other factors to consider for discharge: Pt residing alone in tri level up until recently. Patient will benefit from skilled therapy intervention to address the above noted impairments. PLAN : 
Recommendations and Planned Interventions: self care training, functional mobility training, therapeutic exercise, balance training, therapeutic activities, cognitive retraining, endurance activities, neuromuscular re-education, patient education, home safety training and family training/education Frequency/Duration: Patient will be followed by occupational therapy 5 times a week to address goals. Recommendation for discharge: (in order for the patient to meet his/her long term goals) Therapy up to 5 days/week in SNF setting This discharge recommendation: 
Has been made in collaboration with the attending provider and/or case management Equipment recommendations for successful discharge (if) home: TBD at rehab facility SUBJECTIVE:  
Patient stated I'm confused.  OBJECTIVE DATA SUMMARY:  
HISTORY:  
Past Medical History:  
Diagnosis Date CAD (coronary artery disease) DDD (degenerative disc disease), lumbar Sciatica Past Surgical History:  
Procedure Laterality Date HX CORONARY ARTERY BYPASS GRAFT Expanded or extensive additional review of patient history:  
 
Home Situation Home Environment: Private residence # Steps to Enter: 8 Rails to Enter: Yes Hand Rails : Left One/Two Story Residence: Other (Comment)(Trilevel) # of Interior Steps: (2 sets 8 steps) Interior Rails: Left Lift Chair Available: No 
Living Alone: Yes Support Systems: Child(danyelle) Patient Expects to be Discharged to[de-identified] Rehabilitation facility Current DME Used/Available at Home: [place] [employer], rollator, Shower chair, Raised toilet seat Tub or Shower Type: Shower Hand dominance: Right EXAMINATION OF PERFORMANCE DEFICITS: 
Cognitive/Behavioral Status: 
Neurologic State: Alert Orientation Level: Oriented to person;Oriented to place; Disoriented to situation;Disoriented to time Cognition: Memory loss; Impaired decision making Perception: Appears intact Perseveration: Perseverates during conversation;Perseverates during ADLS;Perseverates during mobility Safety/Judgement: Decreased awareness of environment;Decreased awareness of need for assistance;Decreased awareness of need for safety;Decreased insight into deficits Edema: None noted BUE Hearing: Auditory Auditory Impairment: None Vision/Perceptual:   
Tracking: Able to track stimulus in all quadrants w/o difficulty Range of Motion: 
 
AROM: Generally decreased, functional 
PROM: Generally decreased, functional 
  
  
  
  
  
  
 
Strength: 
 
Strength: Generally decreased, functional 
  
  
  
  
 
Coordination: Coordination: Generally decreased, functional 
Fine Motor Skills-Upper: Left Intact; Right Intact Gross Motor Skills-Upper: Left Intact; Right Intact Tone & Sensation: 
 
Tone: Normal 
  
  
  
  
  
  
  
 
Balance: 
Sitting: Impaired Sitting - Static: Good (unsupported) Sitting - Dynamic: Fair (occasional) Standing: Impaired Standing - Static: Constant support;Good Standing - Dynamic : Constant support; Alisha Cheek Functional Mobility and Transfers for ADLs: 
Bed Mobility: 
  
 
Transfers: 
Sit to Stand: Minimum assistance Stand to Sit: Minimum assistance ADL Assessment: 
Feeding: Supervision(Total A task set up) Oral Facial Hygiene/Grooming: Minimum assistance(Seated; requires assist for completeness) Bathing: Moderate assistance Upper Body Dressing: Minimum assistance Lower Body Dressing: Moderate assistance Toileting: Moderate assistance ADL Intervention and task modifications: 
  
 
  
 
  
 
  
 
  
 
  
 
  
 
Cognitive Retraining Safety/Judgement: Decreased awareness of environment;Decreased awareness of need for assistance;Decreased awareness of need for safety;Decreased insight into deficits Functional Measure: 
Barthel Index: 
 
Bathin Bladder: 5 Bowels: 5 Groomin Dressin Feedin Mobility: 0 Stairs: 0 Toilet Use: 5 Transfer (Bed to Chair and Back): 5 Total: 30/100 The Barthel ADL Index: Guidelines 1. The index should be used as a record of what a patient does, not as a record of what a patient could do. 2. The main aim is to establish degree of independence from any help, physical or verbal, however minor and for whatever reason. 3. The need for supervision renders the patient not independent. 4. A patient's performance should be established using the best available evidence. Asking the patient, friends/relatives and nurses are the usual sources, but direct observation and common sense are also important. However direct testing is not needed. 5. Usually the patient's performance over the preceding 24-48 hours is important, but occasionally longer periods will be relevant. 6. Middle categories imply that the patient supplies over 50 per cent of the effort. 7. Use of aids to be independent is allowed. Amanda Pagan., Barthel, D.W. ([id number]). Functional evaluation: the Barthel Index. [address] [facility] (14)2. Erik Klein ronnie EMEKA Brown, Reuben Fernandes., Alena Murray., [place], [address] (1999). Measuring the change indisability after inpatient rehabilitation; comparison of the responsiveness of the Barthel Index and Functional [place] Measure. Journal of Neurology, Neurosurgery, and Psychiatry, 66(4), 818-961. Cheryle Aggarwal, LIDIA, KERRY Ornelas, & Susanna Norton MRAYMOND. (2004.) Assessment of post-stroke quality of life in cost-effectiveness studies: The usefulness of the Barthel Index and the EuroQoL-5D. [facility], 13, 162-96 Pain Rating: 
Pt reported L hip pain with activity (pt not able to qualify pain report). Appears pain is with activity and not present at rest. 
 
Activity Tolerance:  
Fair Please refer to the flowsheet for vital signs taken during this treatment. After treatment patient left in no apparent distress:   
Sitting in chair with daughter COMMUNICATION/EDUCATION:  
The patients plan of care was discussed with: Registered Nurse and . Home safety education was provided and the patient/caregiver indicated understanding. and Patient/family have participated as able in goal setting and plan of care. This patients plan of care is appropriate for delegation to [facility]. Thank you for this referral. 
Anne Sloan Time Calculation: 37 mins

## 2019-10-09 NOTE — PROGRESS NOTES
Palliative Medicine [place]: [phone] ([phone]) [facility]: [phone] ([phone]) Code Status: Full Code Advance Care Planning: 
Advance Care Planning 10/7/2019 Confirm Advance Directive None Faviola Schulz Supplemental (Other) Decision Maker: Ethan Woo - Child - [phone] Supplemental (Other) Decision Maker: Billy Rodriguez - Child - [phone] Patient / Family Encounter Documentation Participants (names): Patient, patient daughter/ADELA Antonio, Palliative Medicine (Lazarus Ely, Dr. Fredi Calderon) Narrative:  
 
Per chart: 
Mohamud Smiley is a 80 y.o. with a past history of CAD s/p CABG, lumbar DDD/sciatica, 9/4/19 L hip hemiarthroplasty for displaced femoral neck fx went to [facility] SNF (9/10/19-10/2/19) , who was admitted on 10/6/2019 from home with a diagnosis of fever, cough, weakness. Current medical issues leading to Palliative Medicine involvement include: Patient was home from SNF after hip fx repair for just 3 days when he developed fever/ SOB, on abx for pneumonia. Also has drainage from hip wound that started 2 weeks ago, alone with more pain in L hip with standing. Patient noted to have some confusion at times during hospitalization. Dr. Fredi Calderon and I met with patient and his daughter in room. Patient was awake and alert. He had just completed session with PT. Daughter expressed concern about sudden decline in patient's cognition after hip surgery. Up until hip fracture he was living alone and managing on his own. She's wondering if he will get back to his cognitive baseline. We discussed that it is not uncommon for older patients to have a cognitive decline after major event like hip fracture. Daughter is worried about the many unknowns. She said patient has stated that he wants to die in his home and she is concerned about how to honor that wish.  Her hope was to be able to take patient to [place] with her for winter but is questioning if that is right for patient. She is planning a discussion with him about this as well as talking to sister Aryan Rivero about DDNR form. Provided validation and normalization for the crossroads they are at and suggestions for having the conversation so both patient's wishes/needs are honored along with daughter's collective ability to care for him Plan is still for patient to go to rehab at [facility] and family will continue to discuss longer term care for patient. Psycho: pleasant, engaging, talking in generalities Social: patient lived alone, he is [id number] years, two daughters in [place] are [place] Patient's wife  22 years ago. He has two adult daughters, both live in [place]. He is retired from work for [employer], engineering, and real estate sales. Spiritual: not assessed Baseline: Patient independent/active prior to hip fx in September. Just this year he turned over checking account/bill paying to his daughters.  
 
 Psychosocial Issues Identified:  
 
Patient has very supportive daughters willing to jump in to support him Patient has had quick and sharp decline after hip surgery - family is grappling with this happening so suddenly and how to best care for patient going forward Goals of Care / Plan: D/c to SNF ([facility]) Daughters will discuss plan for patient after SNF and also DDNR Follow for support Thank you for the opportunity to be involved in the care of Mr. Mariah Ag and his family. Gerald Hood LMSW, Supervisee in Social Work Palliative Medicine  [phone]

## 2019-10-09 NOTE — PROGRESS NOTES
Hospitalist Progress Note Brett Daniel MD 
Answering service: [phone] OR [phone] from in house phone Date of Service:  10/9/2019 NAME:  Ana Camacho :  3/18/1927 MRN:  [id number] Admission Summary:  
Patient is a 80year old Male medical history significant for HTN , CAD,and DDD who presents to the Emergency Department accompanied by Daughter via EMS with chief complaint of fever. Patient complains of subjective fever, decreased oral intake  increased weakness after he was discharged from rehab 3 days. 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 He denies chills , Shortness of breath , chest pain , palpitations , leg swelling , syncope or near syncope , headaches , seizures or change in mentation , urinary or bowel complains. 
   
Interval history / Subjective:  
Breathing is much improved, and he is feeling much better. No pain from hip but ongoing drainage. Family hesitant about undergoing further procedures. Assessment & Plan:  
 
Possible Bilateral pneumonia , HCAP  
- discharged from rehab 3 days ago  
- in the setting of fever ,leucocytosis and CXR concerning new bibasilar infiltrates - CT chest 10/6 Small bilateral pleural effusions with areas of bilateral lower lobe and lingular atelectasis and bronchiectasis as above. No pulmonary edema or pneumothorax. - on Zosyn (10/6--) and  Levaquin (10/6--) - blood culture NGTD  
- DC IVF  
- Cont to monitor  
  
Recent L Hip Fracture (recent admission), with new drainage ?purulent - X-ray showed left femoral neck fracture 9/3 
- s/p Left hip hemiarthroplasty 2019 
- Per ortho to aspirate joint. If appears septic will need washout.  
- May need to resume Vanc. Will await aspiration fluid. New Bilateral (small) pleural effusion  
- x-ray in 9/3 was unremarkable - No prior CT for comparison  
- Echo with mild to moderate CHF  
- No indication for tapping  
  
Bilateral bronchiectasis - possible  2/2 recurrent aspiration pneumonia  
- Not in acute exacerbation (no significant sputum production) - x-ray in 9/3 was unremarkable - No prior CT for comparison 
  
HTN  
- BP at goal  
- Cont home med's  
  
CAD with valvular replacement 
- Stable - Cont home ASA , BB Statins  
  
Increased Generalized weakness  
- likely from the above  
- PT/OT Code status: FULL CODE.  
DVT prophylaxis: Lovenox PTA: home Baseline: Independent with walker Care Plan discussed with: Patient/Family Disposition: TBD Hospital Problems  Date Reviewed: 10/7/2019 Codes Class Noted POA * (Principal) Pneumonia ICD-10-CM: J18.9 ICD-9-CM: 016  10/6/2019 Yes Review of Systems: A comprehensive review of systems was negative except for that written in the HPI. Vital Signs:  
 Last 24hrs VS reviewed since prior progress note. Most recent are: 
Visit Vitals /63 (BP 1 Location: Right arm, BP Patient Position: At rest) Pulse (!) 57 Temp 97.7 °F (36.5 °C) Resp 18 Ht 5' 10\" (1.778 m) Wt 61.7 kg (136 lb) SpO2 98% BMI 19.51 kg/m² Intake/Output Summary (Last 24 hours) at 10/9/2019 1655 Last data filed at 10/9/2019 4335 Gross per 24 hour Intake 3126 ml Output  Net 3126 ml Physical Examination:  
 
 
     
Constitutional:  No acute distress, cooperative, pleasant ENT:  Oral mucous moist, oropharynx benign. Resp:  CTA bilaterally. No wheezing/rhonchi/rales. No accessory muscle use CV:  Regular rhythm, normal rate, no murmurs, gallops, rubs GI:  Soft, non distended, non tender. normoactive bowel sounds, no hepatosplenomegaly Musculoskeletal:  No edema, warm, 2+ pulses throughout Neurologic:  Moves all extremities. AAOx3, CN II-XII reviewed Skin:  Good turgor, no rashes or ulcers Data Review: Review and/or order of clinical lab test 
Review and/or order of tests in the radiology section of CPT Review and/or order of tests in the medicine section of CPT Labs:  
 
Recent Labs 10/09/19 
2781 10/07/19 
4541 WBC 7.9 10.3 HGB 7.9* 8.8* HCT 24.8* 28.0*  
 192 Recent Labs 10/09/19 
0308 10/09/19 
8400 10/08/19 
9022 10/07/19 
0203 10/06/19 
1729   --  140 141 139  
K 3.7  --  4.0 5.1 4.7 *  --  108 111* 107 CO2 25  --  25 30 27 BUN 26*  --  30* 35* 34* CREA 1.34*  --  1.29 1.31* 1.31* GLU 94  --  91 106* 110* CA 8.5  --  8.6 8.3* 9.2 MG  --  2.0  --   --  1.9 PHOS  --  2.5*  --   --   --   
 
Recent Labs 10/06/19 
1729 SGOT 26 ALT 22 * TBILI 1.0 TP 6.6 ALB 2.7*  
GLOB 3.9 No results for input(s): INR, PTP, APTT, INREXT, INREXT in the last 72 hours. No results for input(s): FE, TIBC, PSAT, FERR in the last 72 hours. No results found for: FOL, RBCF No results for input(s): PH, PCO2, PO2 in the last 72 hours. Recent Labs 10/06/19 
1729 TROIQ 0.08* Lab Results Component Value Date/Time Cholesterol, total 127 02/19/2010 11:45 AM  
 HDL Cholesterol 55 02/19/2010 11:45 AM  
 LDL, calculated 63.6 02/19/2010 11:45 AM  
 Triglyceride 42 02/19/2010 11:45 AM  
 CHOL/HDL Ratio 2.3 02/19/2010 11:45 AM  
 
Lab Results Component Value Date/Time Glucose (POC) 109 (H) 10/06/2019 06:40 PM  
 Glucose (POC) 91 09/04/2019 02:27 PM  
 Glucose (POC) 94 09/03/2019 03:20 PM  
 
Lab Results Component Value Date/Time  Color YELLOW/STRAW 10/06/2019 05:29 PM  
 Appearance CLEAR 10/06/2019 05:29 PM  
 Specific gravity 1.020 10/06/2019 05:29 PM  
 pH (UA) 6.5 10/06/2019 05:29 PM  
 Protein 30 (A) 10/06/2019 05:29 PM  
 Glucose NEGATIVE  10/06/2019 05:29 PM  
 Ketone NEGATIVE  10/06/2019 05:29 PM  
 Bilirubin NEGATIVE  10/06/2019 05:29 PM  
 Urobilinogen 1.0 10/06/2019 05:29 PM  
 Nitrites NEGATIVE  10/06/2019 05:29 PM  
 Leukocyte Esterase NEGATIVE  10/06/2019 05:29 PM  
 Epithelial cells FEW 10/06/2019 05:29 PM  
 Bacteria NEGATIVE  10/06/2019 05:29 PM  
 WBC 0-4 10/06/2019 05:29 PM  
 RBC 0-5 10/06/2019 05:29 PM  
 
 
 
Medications Reviewed:  
 
Current Facility-Administered Medications Medication Dose Route Frequency  balsam peru-castor oil (VENELEX) ointment   Topical BID  hydrALAZINE (APRESOLINE) 20 mg/mL injection 10 mg  10 mg IntraVENous Q6H PRN  
 acetaminophen (TYLENOL) tablet 650 mg  650 mg Oral Q8H  
 levoFLOXacin (LEVAQUIN) 750 mg in D5W IVPB  750 mg IntraVENous Q48H  
 aspirin delayed-release tablet 325 mg  325 mg Oral DAILY  calcium-vitamin D (OS-OLEKSANDR) 500 mg-200 unit tablet  1 Tab Oral TID WITH MEALS  docusate sodium (COLACE) capsule 100 mg  100 mg Oral BID PRN  
 metoprolol tartrate (LOPRESSOR) tablet 50 mg  50 mg Oral BID  sodium chloride (NS) flush 5-40 mL  5-40 mL IntraVENous Q8H  
 sodium chloride (NS) flush 5-40 mL  5-40 mL IntraVENous PRN  
 morphine injection 1 mg  1 mg IntraVENous Q4H PRN  
 ondansetron (ZOFRAN) injection 4 mg  4 mg IntraVENous Q4H PRN  
 enoxaparin (LOVENOX) injection 40 mg  40 mg SubCUTAneous Q24H  piperacillin-tazobactam (ZOSYN) 3.375 g in 0.9% sodium chloride (MBP/ADV) 100 mL  3.375 g IntraVENous Q8H  
 
______________________________________________________________________ EXPECTED LENGTH OF STAY: - - - 
ACTUAL LENGTH OF STAY:          3 
 
            
Brett Daniel MD

## 2019-10-09 NOTE — PROGRESS NOTES
Problem: Mobility Impaired (Adult and Pediatric) Goal: *Acute Goals and Plan of Care (Insert Text) Description FUNCTIONAL STATUS PRIOR TO ADMISSION: Baseline unknown-suspect assist for ADLs and Jase-supervision for ambulation with RW (?). HOME SUPPORT PRIOR TO ADMISSION: Pt lives with his DTR per RN. Physical Therapy Goals Initiated 10/7/2019 1. Patient will move from supine to sit and sit to supine  in bed with minimal assistance/contact guard assist within 7 day(s). 2.  Patient will transfer from bed to chair and chair to bed with minimal assistance/contact guard assist using the least restrictive device within 7 day(s). 3.  Patient will perform sit to stand with minimal assistance/contact guard assist within 7 day(s). 4.  Patient will ambulate with supervision/set-up for 50 feet with the least restrictive device within 7 day(s). 5.  PT to assess stairs as appropriate. Outcome: Progressing Towards Goal 
 PHYSICAL THERAPY TREATMENT Patient: Juana Salinas (63 y.o. male) Date: 10/9/2019 Diagnosis: Pneumonia [J18.9] Pneumonia Precautions: Fall, Bed Alarm(dementia) Chart, physical therapy assessment, plan of care and goals were reviewed. ASSESSMENT Patient continues with skilled PT services and is progressing towards goals. Patient moving much better today and no complaint of hip pain. Still requiring verbal and tactile cues to assist with bed mobility. Minimal assist to contact guard assist with sit to stand and cues for safety management of walker. Patient limited from cognitive status demonstrating some decreased memory and decreased safety. Feel he is not safe to return home alone and likely unable to return to independent living in the long run. May be able to manage in a memory unit/[facility] but would benefit from rehab in SNF or intensive home health but will need 24/7 caregiver at this time for safe discharge. Safe to be up with staff and walk with RW, use bathroom with assist.  
 
Current Level of Function Impacting Discharge (mobility/balance): minimal assist to CGA for all mobility Other factors to consider for discharge: was living alone, daughter is injured from assisting patient and unable to provide necessary assistance. Other daughter arriving next week. PLAN : 
Patient continues to benefit from skilled intervention to address the above impairments. Continue treatment per established plan of care. to address goals. Recommendation for discharge: (in order for the patient to meet his/her long term goals) Therapy up to 5 days/week in SNF setting; if able to have 24/7 care, intensive home health 5x/week This discharge recommendation: 
Has been made in collaboration with the attending provider and/or case management Equipment recommendations for successful discharge (if) home: none SUBJECTIVE:  
Patient stated I don't have any pain today.  OBJECTIVE DATA SUMMARY:  
Critical Behavior: 
Neurologic State: Confused Orientation Level: Oriented to person, Disoriented to time, Disoriented to situation, Disoriented to place Cognition: Follows commands, Memory loss Safety/Judgement: Decreased awareness of environment, Decreased awareness of need for assistance, Decreased awareness of need for safety, Decreased insight into deficits Functional Mobility Training: 
Bed Mobility: 
  
Supine to Sit: Stand-by assistance;Bed Modified Scooting: Supervision Transfers: 
Sit to Stand: Contact guard assistance Stand to Sit: Contact guard assistance Balance: 
Sitting: Intact Sitting - Static: Good (unsupported) Sitting - Dynamic: Good (unsupported) Standing: Impaired; With support Standing - Static: Good Standing - Dynamic : Good Ambulation/Gait Training: 
Distance (ft): 125 Feet (ft) Assistive Device: Gait belt;Walker, rolling Ambulation - Level of Assistance: Contact guard assistance Left Side Weight Bearing: As tolerated Speed/Milka: Accelerated Activity Tolerance:  
observed SOB with activity; oxygen saturation on room air post gait 95% Please refer to the flowsheet for vital signs taken during this treatment. After treatment patient left in no apparent distress:  
Sitting in chair, Call bell within reach and Bed / chair alarm activated COMMUNICATION/COLLABORATION:  
The patients plan of care was discussed with: Registered Nurse and  Nhan Corral, PT Time Calculation: 24 mins

## 2019-10-09 NOTE — PROGRESS NOTES
NUTRITION Nutrition screening referral was triggered based on results obtained during nursing admission assessment (unsure of weight loss). I spoke with pt and daughter at bedside. Pt was in rehab for past 3 weeks and was in hospital for short time before then before then. However, prior to that - pt lived at home independently. They report a UBW of 133-136# for several years. He used to weigh 180# in his younger age, but denies any recent weight loss. He reports a good appetite. Eats 3 meals/day and consumes a Special K Protein drinking every morning with his medications. Daughter states she will bring these in. Declines alternative supplements that we offer. He was seen by SLP yesterday and cleared for a regular diet/thin liquids. At this time, complete nutrition assessment is not indicated. Plan to see patient for rescreen weekly per protocol. Thank you.   
 
Olga Bradshaw RD

## 2019-10-09 NOTE — PROGRESS NOTES
Primary Nurse Barrett Rivas RN and DAVID Irwin performed a dual skin assessment on this patient Impairment noted- see wound doc flow sheet Generalized bruising on bilateral upper extremities; Skin tear on right elbow covered by Mepilex; Pink scrotum; Pink blanchable sacrum and bilateral heels. Mark score is 18 Barrett Rivas RN

## 2019-10-09 NOTE — CONSULTS
Palliative Medicine Consult Ilia: [phone] ([phone]) Patient Name: Kina Brock YOB: 1927 Date of Initial Consult: 10/8/19 Reason for Consult: care decisions Requesting Provider: Dr. Edelmira Wilkins Primary Care Physician: Joo Roach MD 
 
 SUMMARY:  
Kina Brock is a 80 y.o. with a past history of CAD s/p CABG, lumbar DDD/sciatica, 19 L hip hemiarthroplasty for displaced femoral neck fx went to [facility] SNF (9/10/19-10/2/19) , who was admitted on 10/6/2019 from home with a diagnosis of fever, cough, weakness. Current medical issues leading to Palliative Medicine involvement include: Patient was home from SNF after hip fx repair for just 3 days when he developed fever/ SOB, on abx for pneumonia. Also has drainage from hip wound that started 2 weeks ago, alone with more pain in L hip with standing. Patient noted to have some confusion at times during hospitalization. Patient's wife  22 years ago. He has two adult daughters, both live in [place]. Daughter Yola Kolb is staying in [place] with patient now. Family hopes to get patient well enough to travel to [place] and stay near daughters there for the winter. Patient very independent prior to hip fx in September. Loved to be outside cutting wood for the wood stove, cutting grass, doing yard work. He is retired from work for [employer], engineering, and real estate sales. Just this year he turned over checking account/bill paying to his daughters. Prior to hip fracture he was independent in [facility], but doing less around the house in [facility]. PALLIATIVE DIAGNOSES:  
1. Pneumonia, fever 2. L hip wound drainage 3. L hip pain- improved 4. Anemia 5. Debility, deconditioning 6. Mild cognitive changes, hospital delirium PLAN:  
1. Pain - patient and daughter report that pain seems less now with the addition of scheduled tylenol we added yesterday. 2. Divine Lundberg has done a lot of thinking about our conversation from yesterday. She knows that her dad would ideally want to remain in his own home until time of death (rather than move to [place]) but we talked about how this may be unrealistic to accomplish since both daughters live in [place]. She plans on talking with him more about moving to [place] after SNF stay. 3. We talked about many unknowns in his outcome - ? Ability to recover,on going setbacks would not be surprising. Discussed what ifs ( If he does well at SNF - home to his house w caregivers vs. Move to [place]. If he does poorly -- could consider getting him to his house with 24/7 and comfort focus)   Lynnettether very receptive to all these conversations, have very good insight. 4. Education provided about why the decline in cognition since initial hip fracture and hospital/ SNF stay. (he had some baseline cognitive decline already at home, likely early dementia) 5. Divine Lundberg will talk with her sister when she returns to [facility] next week about ongoing plans and also about the [facility] form. 6. Divine Lundberg appreciative of our visits and asks that we keep checking in.  
7. Communicated plan of care with: Palliative IDTJustin 192 Team 
 
 GOALS OF CARE / TREATMENT PREFERENCES:  
 
GOALS OF CARE: 
Patient/Health Care Proxy Stated Goals: Prolong life TREATMENT PREFERENCES:  
Code Status: Full Code Advance Care Planning: 
[] The [facility] Interdisciplinary Team has updated the ACP Navigator with Johana and Patient Capacity Advance Care Planning 10/7/2019 Confirm Advance Directive None Medical Interventions: Full interventions Other Instructions: Other: As far as possible, the palliative care team has discussed with patient / health care proxy about goals of care / treatment preferences for patient. HISTORY:  
 
History obtained from: chart CHIEF COMPLAINT: fever, cough HPI/SUBJECTIVE:   
 The patient is:  
[x] Verbal and participatory [] Non-participatory due to:  
 
80year old male quite independent prior to hip fracture in September (see above) Home from SNF with his daughter visiting to care for him -- he became weaker, developed cough and fever Had made pretty good progress at SNF, walking with rollator. He notes pain in left hip that was new last couple of weeks and also drainage at the dressing site. Clinical Pain Assessment (nonverbal scale for severity on nonverbal patients):  
Clinical Pain Assessment Severity: 0 Duration: for how long has pt been experiencing pain (e.g., 2 days, 1 month, years) Frequency: how often pain is an issue (e.g., several times per day, once every few days, constant) FUNCTIONAL ASSESSMENT:  
 
Palliative Performance Scale (PPS): PPS: 30 
 
 
 PSYCHOSOCIAL/SPIRITUAL SCREENING:  
 
Palliative IDT has assessed this patient for cultural preferences / practices and a referral made as appropriate to needs (Cultural Services, Patient Advocacy, Ethics, etc.) Any spiritual / Church concerns: 
[] Yes /  [x] No 
 
Caregiver Burnout: 
[] Yes /  [x] No /  [] No Caregiver Present Anticipatory grief assessment:  
[x] Normal  / [] Maladaptive ESAS Anxiety: Anxiety: 0 
 
ESAS Depression: Depression: 0 REVIEW OF SYSTEMS:  
 
Positive and pertinent negative findings in ROS are noted above in HPI. The following systems were [x] reviewed / [] unable to be reviewed as noted in HPI Other findings are noted below. Systems: constitutional, ears/nose/mouth/throat, respiratory, gastrointestinal, genitourinary, musculoskeletal, integumentary, neurologic, psychiatric, endocrine. Positive findings noted below. Modified ESAS Completed by: provider Fatigue: 2 Drowsiness: 0 Depression: 0 Pain: 0 Anxiety: 0 Nausea: 0 Anorexia: 0 Dyspnea: 0 Constipation: No  
     
 
 
 PHYSICAL EXAM:  
 
From RN flowsheet: Wt Readings from Last 3 Encounters:  
10/07/19 136 lb (61.7 kg) 09/09/19 136 lb 3.2 oz (61.8 kg) 07/02/15 140 lb 3.2 oz (63.6 kg) Blood pressure 124/51, pulse 60, temperature 98 °F (36.7 °C), resp. rate 18, height 5' 10\" (1.778 m), weight 136 lb (61.7 kg), SpO2 97 %. Pain Scale 1: Numeric (0 - 10) Pain Intensity 1: 0 Pain Onset 1: now Pain Location 1: Hip Pain Orientation 1: Left Pain Description 1: Aching Pain Intervention(s) 1: Refused Last bowel movement, if known:  
 
Constitutional:thin elderly male,  sitting up in chair wrapped in blankets. Eyes: pupils equal, anicteric No respiratory distress Engaging in conversation Speech slow, takes extra time to come out with answers Insight a bit diminished, fixates on certain things HISTORY:  
 
Principal Problem: 
  Pneumonia (10/6/2019) Past Medical History:  
Diagnosis Date  CAD (coronary artery disease)  DDD (degenerative disc disease), lumbar  Sciatica Past Surgical History:  
Procedure Laterality Date  HX CORONARY ARTERY BYPASS GRAFT No family history on file. History reviewed, no pertinent family history. Social History Tobacco Use  Smoking status: Never Smoker  Smokeless tobacco: Never Used Substance Use Topics  Alcohol use: Yes No Known Allergies Current Facility-Administered Medications Medication Dose Route Frequency  balsam peru-castor oil (VENELEX) ointment   Topical BID  hydrALAZINE (APRESOLINE) 20 mg/mL injection 10 mg  10 mg IntraVENous Q6H PRN  
 acetaminophen (TYLENOL) tablet 650 mg  650 mg Oral Q8H  
 levoFLOXacin (LEVAQUIN) 750 mg in D5W IVPB  750 mg IntraVENous Q48H  
 aspirin delayed-release tablet 325 mg  325 mg Oral DAILY  calcium-vitamin D (OS-OLEKSANDR) 500 mg-200 unit tablet  1 Tab Oral TID WITH MEALS  docusate sodium (COLACE) capsule 100 mg  100 mg Oral BID PRN  
 metoprolol tartrate (LOPRESSOR) tablet 50 mg  50 mg Oral BID  
  sodium chloride (NS) flush 5-40 mL  5-40 mL IntraVENous Q8H  
 sodium chloride (NS) flush 5-40 mL  5-40 mL IntraVENous PRN  
 morphine injection 1 mg  1 mg IntraVENous Q4H PRN  
 ondansetron (ZOFRAN) injection 4 mg  4 mg IntraVENous Q4H PRN  
 enoxaparin (LOVENOX) injection 40 mg  40 mg SubCUTAneous Q24H  piperacillin-tazobactam (ZOSYN) 3.375 g in 0.9% sodium chloride (MBP/ADV) 100 mL  3.375 g IntraVENous Q8H  
 
 
 
 LAB AND IMAGING FINDINGS:  
 
Lab Results Component Value Date/Time WBC 7.9 10/09/2019 03:07 AM  
 HGB 7.9 (L) 10/09/2019 03:07 AM  
 PLATELET 062 02/82/0435 03:07 AM  
 
Lab Results Component Value Date/Time Sodium 140 10/09/2019 03:08 AM  
 Potassium 3.7 10/09/2019 03:08 AM  
 Chloride 110 (H) 10/09/2019 03:08 AM  
 CO2 25 10/09/2019 03:08 AM  
 BUN 26 (H) 10/09/2019 03:08 AM  
 Creatinine 1.34 (H) 10/09/2019 03:08 AM  
 Calcium 8.5 10/09/2019 03:08 AM  
 Magnesium 2.0 10/09/2019 03:07 AM  
 Phosphorus 2.5 (L) 10/09/2019 03:07 AM  
  
Lab Results Component Value Date/Time AST (SGOT) 26 10/06/2019 05:29 PM  
 Alk. phosphatase 156 (H) 10/06/2019 05:29 PM  
 Protein, total 6.6 10/06/2019 05:29 PM  
 Albumin 2.7 (L) 10/06/2019 05:29 PM  
 Globulin 3.9 10/06/2019 05:29 PM  
 
Lab Results Component Value Date/Time INR 1.2 (H) 09/03/2019 01:21 PM  
 Prothrombin time 12.2 (H) 09/03/2019 01:21 PM  
 aPTT 29.9 05/24/2009 07:50 AM  
  
Lab Results Component Value Date/Time Ferritin 206 09/27/2010 08:59 AM  
  
No results found for: PH, PCO2, PO2 No components found for: [place] No results found for: CPK, CKMB Total time: 25min Counseling / coordination time, spent as noted above: 25 
> 50% counseling / coordination?: yes Discussing current medical issues, option for SNF again, travel to [place], confusion, change from baseline Care goals, code status education Prolonged service was provided for  []30 min   []75 min in face to face time in the presence of the patient, spent as noted above. Time Start:  
Time End:  
Note: this can only be billed with 87882 (initial) or 79508 (follow up). If multiple start / stop times, list each separately.

## 2019-10-09 NOTE — PROGRESS NOTES
Bedside shift change report given to [address] [place] [address] Drive (oncoming nurse) by Madison Álvarez (offgoing nurse). Report included the following information SBAR, Kardex, Intake/Output, MAR and Recent Results.

## 2019-10-09 NOTE — PROGRESS NOTES
ORTHO PROGRESS NOTE SUBJECTIVE: 
Meghan Ivan states his left hip pain and motion have improved. Remains on IV abx for suspected pneumonia. OBJECTIVE: 
Patient Vitals for the past 24 hrs: 
 Temp Pulse BP  
10/09/19 0838 98 °F (36.7 °C) 60 124/51  
10/09/19 0308 98.3 °F (36.8 °C) (!) 53 164/55  
10/08/19 2157 98.2 °F (36.8 °C) 63 155/67  
10/08/19 1657 97.5 °F (36.4 °C) 70 139/51  
10/08/19 1523 98.1 °F (36.7 °C) 71 138/64 Alert, no distress. Lying in bed. Family present. Respirations unlabored. No pain with PROM hip. AROM better today with less pain and improved strength. Left hip incision 1cm openings with copious drainage, thicker than 2 days ago, and fullness c/w seroma. Thigh soft. Calf NT bilat. Recent Labs 10/09/19 
0308 10/09/19 
7131 HGB  --  7.9* HCT  --  24.8* PLT  --  203 BUN 26*  --   
CREA 1.34*  --   
GFRAA >60  --   
GFRNA 50*  --   
 
WBC 7.9. PT/OT:  
Gait:  Gait Speed/Milka: Accelerated Step Length: Right shortened, Left shortened Stance: Left decreased Gait Abnormalities: Antalgic, Decreased step clearance, Step to gait(pt gait quality declined significantly with increased walkin) Ambulation - Level of Assistance: Contact guard assistance Distance (ft): 125 Feet (ft) Assistive Device: Gait belt, Walker, rolling ASSESSMENT: 
Slowly healing wound with drainage s/p left hip steve-arthroplasty 9/4/19 for displaced femoral neck fracture. PLAN: 
I will speak with Dr. Kenny Reed (covering for Dr. Weston Ro this week) regarding potential intervention for his hip. Cont abx. KENTON Vazquez [facility]

## 2019-10-09 NOTE — PROGRESS NOTES
Problem: Self Care Deficits Care Plan (Adult) Goal: *Acute Goals and Plan of Care (Insert Text) Description FUNCTIONAL STATUS PRIOR TO ADMISSION: Last week pt was reportedly discharged from SNF to home with daughter assisting (visiting from [place]) and pt was requiring significant physical assistance to stand at night (daughter reportedly with back injury attempting to lift pt at night). HOME SUPPORT: Pt was residing alone in tri-level home (2 daughters reside in [place]). Occupational Therapy Goals Initiated 10/8/2019 1. Patient will perform upper body dressing with supervision/set-up within 7 day(s). 2.  Patient will perform lower body dressing with minimal assistance/contact guard assist within 7 day(s). 3.  Patient will perform grooming standing sink level with minimal assistance/contact guard assist within 7 day(s). 4.  Patient will perform toilet transfers contact guard assist within 7 day(s). 5.  Patient will perform all aspects of toileting with minimal assistance/contact guard assist within 7 day(s). 6.  Patient will participate in upper extremity therapeutic exercise/activities with supervision/set-up for 10 minutes within 7 day(s). Outcome: Progressing Towards Goal 
 OCCUPATIONAL THERAPY TREATMENT Patient: Bonny Medina (83 y.o. male) Date: 10/9/2019 Diagnosis: Pneumonia [J18.9] Pneumonia Precautions: Fall, Bed Alarm(dementia) Chart, occupational therapy assessment, plan of care, and goals were reviewed. ASSESSMENT Patient continues with skilled OT services and is progressing towards goals. Participation impacted by decreased confusion this date, but need for increased time to process. The patient presents with impaired functional reach to feet, L hip pain with mobility to reach to feet, impaired balance, and generalized weakness related to recent medical course.  Of note, pt was just discharged home from SNF (last Thursday) after approximately 3 weeks in SNF rehab s/p hemiarthroplasty s/p fall with left hip fracture. He follows 100% of commands. Current Level of Function Impacting Discharge (ADLs): UE self care with set up to min assist, LE self care with mod assist, functional transfers (toilet and chair) with RW and CGA. Other factors to consider for discharge: recent discharge from SNF 
    
 
PLAN : 
Patient continues to benefit from skilled intervention to address the above impairments. Continue treatment per established plan of care. to address goals. Recommend with staff: [place] in chair for all meals, ambulation to bathroom with RW and CGA to toilet, set up for UE self care and mod assist for LE self care Recommend next OT session: LE self care, standing for grooming Recommendation for discharge: (in order for the patient to meet his/her long term goals) Therapy up to 5 days/week in SNF setting This discharge recommendation: 
Has been made in collaboration with the attending provider and/or case management Equipment recommendations for successful discharge (if) home: none SUBJECTIVE:  
Patient stated No that's not right, it's [facility]'s.  for place OBJECTIVE DATA SUMMARY:  
Cognitive/Behavioral Status: 
Neurologic State: Alert Orientation Level: Oriented to person;Oriented to place(with additional time for place) Cognition: Follows commands;Decreased attention/concentration;Memory loss Perception: Appears intact Perseveration: No perseveration noted Safety/Judgement: Awareness of environment Functional Mobility and Transfers for ADLs: 
Bed Mobility: 
Supine to Sit: Stand-by assistance;Bed Modified Sit to Supine: Minimum assistance;Assist x1;Additional time Scooting: Supervision Transfers: 
Sit to Stand: Contact guard assistance;Assist x1;Additional time Bed to Chair: Assist x1;Contact guard assistance; Additional time Balance: 
Sitting: Intact Sitting - Static: Good (unsupported) Sitting - Dynamic: Good (unsupported) Standing: Impaired; With support Standing - Static: Fair Standing - Dynamic : Fair ADL Intervention: 
  
 
  
 
Upper Body Bathing Bathing Assistance: Set-up(in simulation) Position Performed: Seated in chair Lower Body Bathing Lower Body : Minimum assistance(in simulation) Position Performed: Seated in chair Lower Body Dressing Assistance Dressing Assistance: Moderate assistance(in simulation and inferred with mobility) Socks: Moderate assistance Leg Crossed Method Used: (able to perform with right LE) Position Performed: Bending forward method;Seated in chair;Standing Adaptive Equipment Used: Fariba Casey Cognitive Retraining Organizing/Sequencing: Breaking task down Following Commands: Follows one step commands/directions Safety/Judgement: Awareness of environment Cues: Verbal cues provided;Visual cues provided Activity Tolerance:  
Fair Please refer to the flowsheet for vital signs taken during this treatment. After treatment patient left in no apparent distress:  
Supine in bed, Heels elevated for pressure relief, Call bell within reach, Bed / chair alarm activated, Caregiver / family present and Side rails x 3 
 
COMMUNICATION/COLLABORATION:  
The patients plan of care was discussed with: Physical Therapist and Registered Nurse Miguel Smith [facility] Time Calculation: 23 mins I unit. Time spent changing soiled sheet.

## 2019-10-09 NOTE — WOUND CARE
WOCN Note:  
 
New consult placed by RN for: pink blanchable bilateral heels and sacrum. Chart shows: 
Admitted for pneumonia, dementia, fever, elevated B/P and new bilateral small effusions. PMH: fractured left femur, closed, HTN, ground level fall, left hip replaced 9-4-19, CAD, DDD, sciatica. WBC = 7.9 On = 10-9-19 Admitted from home. Assessment:  
Patient is alert but not oriented ( on bed alarm), communicative, incontinent and mobile ( but very weak). Needs assistance in repositioning and lifting up in the bed Patient wearing briefs for incontinence. Bed: Versa Care Bed in lowest position / secondary to falls and dementia. Diet: cardiac regular. Patient reports no pain relaxing in bed watching TV. -Bilateral heels are pink and blanching slowly. -Buttocks pink related to incontinence/ wearing brief / no opened areas. 
-Sacral area is pink: with noted old healed pink scar / sacral area blanching slowly. Recommendations:   
- vasolex ointment twice daily to heels and sacrum. - Hydraguard cream for incontinence and buttock irritation twice daily and as needed. Minimize layers of linen/pads under patient to optimize support surface. Turn/reposition approximately every 2 hours and offload heels. Manage incontinence / promote continence; Remedy Hydaraguard cream ( blue tube)  to buttocks and  daily and as needed with incontinence care. Specialty bed: [facility] Discussed above plan with patient and RN: Aris Booth Transition of Care: Plan to follow weekly and as needed while admitted to hospital.  
 
Pj POWELL RN Wound Care Department Office: [phone] Pager: [phone]

## 2019-10-10 NOTE — PROGRESS NOTES
Hospitalist Progress Note Kimo Moore MD 
Answering service: [phone] OR [phone] from in house phone Date of Service:  10/10/2019 NAME:  Blane Millan :  3/18/1927 MRN:  [id number] Admission Summary:  
Patient is a 80year old Male medical history significant for HTN , CAD,and DDD who presents to the Emergency Department accompanied by Daughter via EMS with chief complaint of fever. Patient complains of subjective fever, decreased oral intake  increased weakness after he was discharged from rehab 3 days. 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 He denies chills , Shortness of breath , chest pain , palpitations , leg swelling , syncope or near syncope , headaches , seizures or change in mentation , urinary or bowel complains. 
   
Interval history / Subjective: No hip pain today or SOB. Hip erythema is worsening however. Assessment & Plan:  
 
Possible Bilateral pneumonia , HCAP - febrile on admission, however with bilateral very small infiltrates in setting of new LE edema and new onset CHF. Possible that findings on CT are due to CHF. - in the setting of fever ,leucocytosis and CXR concerning new bibasilar infiltrates - CT chest 10/ Small bilateral pleural effusions with areas of bilateral lower lobe and lingular atelectasis and bronchiectasis as above. No pulmonary edema or pneumothorax. - DC Zosyn and switch to oral levofloxacin  
- blood culture NGTD  
- DC IVF  
- Cont to monitor  
  
Recent L Hip Fracture (recent admission), with new drainage ?purulent, suspcious that this may be source of original fever. - X-ray showed left femoral neck fracture 9/3 
- s/p Left hip hemiarthroplasty 2019 
- Per ortho to aspirate joint. If appears septic will need washout. - holding IV antibiotics for now, possible that aspiration will be sterile as has been on broad spectrum abx.  
- CT of the hip today to rule out infection. Acute CHF exacerbation. No known history of CHF. Pulmonary edema. NYHA class 3-4 on admission. H/o known CAD, ?ischemic. 
- Echo with mild to moderate CHF, EF 40-45% - On metoprolol 
- Daily lasix 
- Not on ACE/ARB due to CKD. - May require cardiology consult, will need to discuss with family given age. - I and O and daily weights.  
  
Bilateral bronchiectasis - possible  2/2 recurrent aspiration pneumonia  
- Not in acute exacerbation (no significant sputum production) - x-ray in 9/3 was unremarkable - No prior CT for comparison 
  
HTN  
- BP at goal  
- Cont home med's  
  
CAD with valvular replacement 
- Stable - Cont home ASA , BB Statins  
  
Increased Generalized weakness  
- likely from the above  
- PT/OT Code status: FULL CODE.  
DVT prophylaxis: Lovenox PTA: home Baseline: Independent with walker Care Plan discussed with: Patient/Family Disposition: TBD Hospital Problems  Date Reviewed: 10/7/2019 Codes Class Noted POA * (Principal) Pneumonia ICD-10-CM: J18.9 ICD-9-CM: 702  10/6/2019 Yes Review of Systems: A comprehensive review of systems was negative except for that written in the HPI. Vital Signs:  
 Last 24hrs VS reviewed since prior progress note. Most recent are: 
Visit Vitals /52 (BP 1 Location: Right arm, BP Patient Position: At rest) Pulse (!) 53 Temp 97.7 °F (36.5 °C) Resp 18 Ht 5' 10\" (1.778 m) Wt 61.7 kg (136 lb) SpO2 97% BMI 19.51 kg/m² Intake/Output Summary (Last 24 hours) at 10/10/2019 1607 Last data filed at 10/10/2019 0225 Gross per 24 hour Intake  Output 750 ml Net -750 ml Physical Examination:  
 
 
     
Constitutional:  No acute distress, cooperative, pleasant ENT:  Oral mucous moist, oropharynx benign. Resp: CTA bilaterally. No wheezing/rhonchi/rales. No accessory muscle use CV:  Regular rhythm, normal rate, no murmurs, gallops, rubs GI:  Soft, non distended, non tender. normoactive bowel sounds, no hepatosplenomegaly Musculoskeletal:  No edema, warm, 2+ pulses throughout, L hip with erythema, warmth and purulent/serosang drainage. Neurologic:  Moves all extremities. AAOx3, CN II-XII reviewed Skin:  Good turgor, no rashes or ulcers Data Review:  
 Review and/or order of clinical lab test 
Review and/or order of tests in the radiology section of CPT Review and/or order of tests in the medicine section of CPT Labs:  
 
Recent Labs 10/10/19 
3193 10/09/19 
1291 WBC 6.7 7.9 HGB 8.8* 7.9*  
HCT 27.7* 24.8*  
 203 Recent Labs 10/10/19 
1150 10/09/19 
0308 10/09/19 
1979 10/08/19 
5774  140  --  140  
K 4.1 3.7  --  4.0  
* 110*  --  108 CO2 27 25  --  25 BUN 24* 26*  --  30* CREA 1.54* 1.34*  --  1.29  
GLU 91 94  --  91  
CA 9.1 8.5  --  8.6 MG 2.0  --  2.0  --   
PHOS 2.6  --  2.5*  -- No results for input(s): SGOT, GPT, ALT, AP, TBIL, TBILI, TP, ALB, GLOB, GGT, AML, LPSE in the last 72 hours. No lab exists for component: AMYP, HLPSE No results for input(s): INR, PTP, APTT, INREXT, INREXT in the last 72 hours. No results for input(s): FE, TIBC, PSAT, FERR in the last 72 hours. No results found for: FOL, RBCF No results for input(s): PH, PCO2, PO2 in the last 72 hours. No results for input(s): CPK, CKNDX, TROIQ in the last 72 hours. No lab exists for component: CPKMB Lab Results Component Value Date/Time Cholesterol, total 127 02/19/2010 11:45 AM  
 HDL Cholesterol 55 02/19/2010 11:45 AM  
 LDL, calculated 63.6 02/19/2010 11:45 AM  
 Triglyceride 42 02/19/2010 11:45 AM  
 CHOL/HDL Ratio 2.3 02/19/2010 11:45 AM  
 
Lab Results Component Value Date/Time  Glucose (POC) 109 (H) 10/06/2019 06:40 PM  
 Glucose (POC) 91 09/04/2019 02:27 PM  
 Glucose (POC) 94 09/03/2019 03:20 PM  
 
Lab Results Component Value Date/Time Color YELLOW/STRAW 10/06/2019 05:29 PM  
 Appearance CLEAR 10/06/2019 05:29 PM  
 Specific gravity 1.020 10/06/2019 05:29 PM  
 pH (UA) 6.5 10/06/2019 05:29 PM  
 Protein 30 (A) 10/06/2019 05:29 PM  
 Glucose NEGATIVE  10/06/2019 05:29 PM  
 Ketone NEGATIVE  10/06/2019 05:29 PM  
 Bilirubin NEGATIVE  10/06/2019 05:29 PM  
 Urobilinogen 1.0 10/06/2019 05:29 PM  
 Nitrites NEGATIVE  10/06/2019 05:29 PM  
 Leukocyte Esterase NEGATIVE  10/06/2019 05:29 PM  
 Epithelial cells FEW 10/06/2019 05:29 PM  
 Bacteria NEGATIVE  10/06/2019 05:29 PM  
 WBC 0-4 10/06/2019 05:29 PM  
 RBC 0-5 10/06/2019 05:29 PM  
 
 
 
Medications Reviewed:  
 
Current Facility-Administered Medications Medication Dose Route Frequency  balsam peru-castor oil (VENELEX) ointment   Topical BID  furosemide (LASIX) injection 20 mg  20 mg IntraVENous DAILY  hydrALAZINE (APRESOLINE) 20 mg/mL injection 10 mg  10 mg IntraVENous Q6H PRN  
 acetaminophen (TYLENOL) tablet 650 mg  650 mg Oral Q8H  
 levoFLOXacin (LEVAQUIN) 750 mg in D5W IVPB  750 mg IntraVENous Q48H  
 aspirin delayed-release tablet 325 mg  325 mg Oral DAILY  calcium-vitamin D (OS-OLEKSANDR) 500 mg-200 unit tablet  1 Tab Oral TID WITH MEALS  docusate sodium (COLACE) capsule 100 mg  100 mg Oral BID PRN  
 metoprolol tartrate (LOPRESSOR) tablet 50 mg  50 mg Oral BID  sodium chloride (NS) flush 5-40 mL  5-40 mL IntraVENous Q8H  
 sodium chloride (NS) flush 5-40 mL  5-40 mL IntraVENous PRN  
 morphine injection 1 mg  1 mg IntraVENous Q4H PRN  
 ondansetron (ZOFRAN) injection 4 mg  4 mg IntraVENous Q4H PRN  
 [Held by provider] enoxaparin (LOVENOX) injection 40 mg  40 mg SubCUTAneous Q24H  piperacillin-tazobactam (ZOSYN) 3.375 g in 0.9% sodium chloride (MBP/ADV) 100 mL  3.375 g IntraVENous Q8H  
 
 ______________________________________________________________________ EXPECTED LENGTH OF STAY: 3d 7h 
ACTUAL LENGTH OF STAY:          4 
 
            
Compa Mon, MD

## 2019-10-10 NOTE — CDMP QUERY
#1 
 
Pt admitted with Pneumonia Pt noted to have recent hospitalization and rehab . If possible, please document in the progress notes and d/c summary if you are evaluating and / or treating any of the following: 
 
? Gram Negative Pneumonia ? As[iration Pneumonia 
? Other, please specify ? Clinically unable to determine The medical record reflects the following: 
  Risk Factors: pneumonia/swallowing disorder Clinical Indicators:  
ED_ 
80 y.o. male with past medical history significant for CAD, DDD, sciatica who presents from home via EMS with chief complaint of fever. Per pt's daughter, pt was d/c home from rehab 3 days ago to live with her. Pt has been unable to ambulate or walk up the stairs since then. Pt has had increased weakness in legs since being home. His fatigue was worse today. He developed a fever today. She also endorses cough and states that rehab was concerned that he has a swallowing disorder H/P- 
Possible Bilateral pneumonia , HCAP  
- discharged from rehab 3 days ago  
- in the setting of fever ,leucocytosis and CXR concerning new bibasilar infiltrates Bilateral  bronchiectasis - possible  2/2 recurrent aspiration pneumonia Treatment: vancomycin IV, zosyn IV, levaquin IV Thank you, 
       Mel Ashley [facility], [address] [facility]

## 2019-10-10 NOTE — PROGRESS NOTES
Ortho: 
Left hip dressing has copious malodorous drainage past 24 hours. Dr. Fabio Boudreaux recommends bedside aspiration of left lateral hip near incision to see if seroma/hematoma encountered. Patient agrees. Sterile technique, chloraprep, 18 gauge needle immediately superior to site of incision drainage, I aspirated 1 cc of purulent appearing material followed by 5 cc dark blood. Fluid very thick and difficult to aspirate with 10 cc syringe. Fluid swab sent for culture. I will make him NPO past MN and hold Lovenox until gram stain read to influence decision making about surgery later this week. Cont abx.  
 
KENTON Wallace

## 2019-10-10 NOTE — CDMP QUERY
#2 
 
Pt admitted with pneumonia /Pt noted to have pleural effusions and CHF documented. If possible, please document in the progress notes and d/c summary if you are evaluating and / or treating any of the following: ? Acute Systolic CHF POA ? Chronic Systolic CHF  
? Other, please specify ? Clinically unable to determine The medical record reflects the following: 
  Risk Factors: CHF Clinical Indicators:  
10/9-PN New Bilateral (small) pleural effusion  
- x-ray in 9/3 was unremarkable - No prior CT for comparison  
- Echo with mild to moderate CHF  
- No indication for tapping ECHO Result status: Final result · Left Ventricle: Normal cavity size. Mild concentric hypertrophy. Mild-to-moderate systolic dysfunction. Estimated left ventricular ejection fraction is 41 - 45%. No regional wall motion abnormality noted. Moderate (grade 2) left ventricular diastolic dysfunction. · Left Atrium: Moderately dilated left atrium. · Right Atrium: Moderately dilated right atrium. · Interatrial Septum: Color flow Doppler was used. · Aortic Valve: Prosthetic aortic valve. Aortic valve mean gradient is 26 mmHg. 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 Prosthesis is normal. Prosthetic valve function is insufficient. There is mild paravalvular leaking. Mild to moderate aortic valve regurgitation is present. · Mitral Valve: Moderate mitral annular calcification. Mild mitral valve regurgitation is present. · Tricuspid Valve: Mild tricuspid valve regurgitation is present. · Pulmonary Artery: There is no evidence of pulmonary hypertension. · IVC/Hepatic Veins: Severely elevated central venous pressure (15+ mmHg); IVC diameter is larger than 21 mm and collapses less than 50% with respiration. Treatment: lasix IV, echo Thank you, 
       Paige Fletcher [facility], [address]

## 2019-10-10 NOTE — PROGRESS NOTES
Spoke with  about patient being NPO and holding lovenox and he stated there was a culture on the computer and an order for culture if I would be able to go in and print a label and send it down. Upon getting to pt room culture seen on computer but no order placed to send it down. Spoke with  on call and verbal order for a body fluid w/ gram stain culture obtained, will print label and send culture down.

## 2019-10-10 NOTE — PROGRESS NOTES
ORTHO PROGRESS NOTE October 10, 2019 Admit Date:  
10/6/2019 Post Op day: * No surgery found * Subjective:   
Paula Delgado remains pleasantly confused. Left hip with superficial aspiration last night producing small amount of purulent appearing fluid but mostly blood. No orgs seen and no growth so far however. No cell count reported. PT/OT:  
Gait:  Gait Speed/Milka: Accelerated Step Length: Right shortened, Left shortened Stance: Left decreased Gait Abnormalities: Antalgic, Decreased step clearance, Step to gait(pt gait quality declined significantly with increased walkin) Ambulation - Level of Assistance: Contact guard assistance Distance (ft): 125 Feet (ft) Assistive Device: Gait belt, Walker, rolling Vital Signs:   
Patient Vitals for the past 8 hrs: 
 BP Temp Pulse Resp SpO2  
10/10/19 0849 190/78 97.6 °F (36.4 °C) 66 19 99 % Temp (24hrs), Av.9 °F (36.6 °C), Min:97.6 °F (36.4 °C), Max:98.3 °F (36.8 °C) Pain Control:  
Pain Assessment Pain Scale 1: Numeric (0 - 10) Pain Intensity 1: 0 Pain Onset 1: now Pain Location 1: Hip Pain Orientation 1: Left Pain Description 1: Aching Pain Intervention(s) 1: Medication (see MAR) Meds: 
 
Current Facility-Administered Medications Medication Dose Route Frequency  balsam peru-castor oil (VENELEX) ointment   Topical BID  furosemide (LASIX) injection 20 mg  20 mg IntraVENous DAILY  hydrALAZINE (APRESOLINE) 20 mg/mL injection 10 mg  10 mg IntraVENous Q6H PRN  
 acetaminophen (TYLENOL) tablet 650 mg  650 mg Oral Q8H  
 levoFLOXacin (LEVAQUIN) 750 mg in D5W IVPB  750 mg IntraVENous Q48H  
 aspirin delayed-release tablet 325 mg  325 mg Oral DAILY  calcium-vitamin D (OS-OLEKSANDR) 500 mg-200 unit tablet  1 Tab Oral TID WITH MEALS  docusate sodium (COLACE) capsule 100 mg  100 mg Oral BID PRN  
 metoprolol tartrate (LOPRESSOR) tablet 50 mg  50 mg Oral BID  
  sodium chloride (NS) flush 5-40 mL  5-40 mL IntraVENous Q8H  
 sodium chloride (NS) flush 5-40 mL  5-40 mL IntraVENous PRN  
 morphine injection 1 mg  1 mg IntraVENous Q4H PRN  
 ondansetron (ZOFRAN) injection 4 mg  4 mg IntraVENous Q4H PRN  
 [Held by provider] enoxaparin (LOVENOX) injection 40 mg  40 mg SubCUTAneous Q24H  piperacillin-tazobactam (ZOSYN) 3.375 g in 0.9% sodium chloride (MBP/ADV) 100 mL  3.375 g IntraVENous Q8H  
 
 
LAB:   
Recent Labs 10/10/19 
4310 HCT 27.7* HGB 8.8* Transfuse PRBC's:   
 
Assessment & Physician's Comment: 
Thigh full but soft Erythema appears unchanged based on previous exam notes Moves hip without significant discomfort Dressing is seropurulent Neurovascular checks within normal limits Orientation:  Disoriented (baseline) Principal Problem: 
  Pneumonia (10/6/2019) Plan: 
 
Aspiration results noted Have spoken with Dr Dino Knight who would prefer to wait and see if erythema is worsening before considering washout Spoke with Dr Lucrecia Curry [facility]) who questions PNA dx and thinks majority of issues are coming from hip MRI of left hip to be ordered to determine extent of fluid collection and if any joint involvement Medical management per primary teams Ayush Knight and Lucrecia Curry in agreement with current plan Jude Peoples PA-C Orthopedic Trauma Service [facility]

## 2019-10-11 NOTE — PROGRESS NOTES
Problem: Mobility Impaired (Adult and Pediatric) Goal: *Acute Goals and Plan of Care (Insert Text) Description FUNCTIONAL STATUS PRIOR TO ADMISSION: Baseline unknown-suspect assist for ADLs and Jase-supervision for ambulation with RW (?). HOME SUPPORT PRIOR TO ADMISSION: Pt lives with his DTR per RN. Physical Therapy Goals Initiated 10/7/2019 1. Patient will move from supine to sit and sit to supine  in bed with minimal assistance/contact guard assist within 7 day(s). 2.  Patient will transfer from bed to chair and chair to bed with minimal assistance/contact guard assist using the least restrictive device within 7 day(s). 3.  Patient will perform sit to stand with minimal assistance/contact guard assist within 7 day(s). 4.  Patient will ambulate with supervision/set-up for 50 feet with the least restrictive device within 7 day(s). 5.  PT to assess stairs as appropriate. Outcome: Progressing Towards Goal 
 PHYSICAL THERAPY TREATMENT Patient: Gerard Huerta (30 y.o. male) Date: 10/11/2019 Diagnosis: Pneumonia [J18.9] Pneumonia Precautions: Fall, Bed Alarm(dementia) Chart, physical therapy assessment, plan of care and goals were reviewed. ASSESSMENT Patient continues with skilled PT services and is progressing towards goals. He is stating having increased pain in left hip but agreeable to therapy. Moving well with bed mobility. Stood to urinate and able to ambulate in [place] with RW.  Gait is more antalgic today but improved with distance. Left up in chair with daughter and ortho MD present. At this time, continue to agree that patient is not safe to return home without 24/7 supervision and assist with mobility. Current Level of Function Impacting Discharge (mobility/balance): contact guard Other factors to consider for discharge: increased confusion/dementia making it unsafe for him to be home alone PLAN : 
 Patient continues to benefit from skilled intervention to address the above impairments. Continue treatment per established plan of care. to address goals. Recommendation for discharge: (in order for the patient to meet his/her long term goals) Therapy up to 5 days/week in SNF setting This discharge recommendation: 
Has been made in collaboration with the attending provider and/or case management Equipment recommendations for successful discharge (if) home: none SUBJECTIVE:  
Patient stated I'll walk to burn it off.  OBJECTIVE DATA SUMMARY:  
Critical Behavior: 
Neurologic State: Alert, Confused Orientation Level: Oriented to person, Oriented to place Cognition: Follows commands, Memory loss Safety/Judgement: Awareness of environment Functional Mobility Training: 
Bed Mobility: 
  
Supine to Sit: Minimum assistance Scooting: Modified independent Transfers: 
Sit to Stand: Contact guard assistance Stand to Sit: Contact guard assistance Bed to Chair: Contact guard assistance Balance: 
Sitting: Intact Sitting - Static: Good (unsupported) Sitting - Dynamic: Good (unsupported) Standing: Impaired; With support Standing - Static: Fair Standing - Dynamic : Fair Ambulation/Gait Training: 
Distance (ft): 100 Feet (ft) Assistive Device: Gait belt;Walker, rolling Ambulation - Level of Assistance: Contact guard assistance Gait Abnormalities: Antalgic Left Side Weight Bearing: As tolerated Stance: Left decreased Step Length: Left shortened;Right shortened After treatment patient left in no apparent distress:  
Sitting in chair, Call bell within reach, Bed / chair alarm activated and Caregiver / family present COMMUNICATION/COLLABORATION:  
The patients plan of care was discussed with: Registered Nurse and  Gerda Rodriguez, PT

## 2019-10-11 NOTE — PROGRESS NOTES
Ortho Progress Note 81yo male, s/p left hip hemiarthroplasty - posterior (9/3/19 - [facility]) Discharged to rehab facility and returned home 10/3/19 Presented to ER 10/6/19 with fever/cough/weakness Had ongoing drainage from left hip incision Admitted for pneumonia, has been on IV ABX (levaquin) since admission Aspiration of suspected seroma 10/9/19 growing staph CT of hip 10/10/19 - \"ill-defined 3.8 x 3.7 
x 7.7 cm low density collection lateral to the left greater trochanter\" Today he is afebrile, WBC 9.4, pain controlled Copious drainage saturating left hip dressing Small opening at inferior end of incision with surrounding erythema/induration No significant pain with PROM left hip NVI Ongoing drainage 1 month post-op is concerning although do not suspect deep infection Suspect superficial seroma, aspiration growing staph Discussed with patient and daughter Treatment options would be ongoing IV antibiotic therapy vs surgical I&D Dr. Yana Ingram evaluated patient several days ago and so far plan has been non-operative Left a message with him to discuss further. I will follow-up this afternoon once I speak with Dr. Yana Ingram. Alyssa Knox PA-C [facility] [address] 
10/11/19 
12:37

## 2019-10-11 NOTE — ADVANCED PRACTICE NURSE
Sleep disorders: Awake, fearful and suspicious. Problems with eating or feeding:Refusing water or food, but after 2 hours of nurses at bedside trying to calm patient, he finally drank some pepsi and ate some chips. Incontinence: Incontinent brief on. Confusion:  ?Delirium. Combative, fearful and suspicious tonight. After 2 hours of direct care nurses at bedside listening and reassuring, patient became calmer. Knows name, that he is a hospital, his birthday. Thinking his daughters are in danger. Thinks at times that the nurses are police. He talked about having a fall and his hip surgery and his concern over his continued weakness. He told about his life story and his pride at providing for his family and good strong health over the years. Evidence of Falls:Schrmid score 5. Skin breakdown: Mark score 18. Plan: Listening to patient. Calm environment. Deescalate techniques. Emiliano Galeas RN, MSN, CNS-BC, Gerontology [phone]

## 2019-10-11 NOTE — PROGRESS NOTES
Spoke with Laurie Espinoza NP about pt being confused trying to get out of bed and beginning to become combative. One time order for Haldol to be ordered. 1:18 AM 
Fariba Martinez Gerontology RN present at bedside to sit with and attempt to redirect patient to avoid medication. Patient trusting this new presence in the room and is calming down for her. She will sit with him for a while. Will continue to monitor.

## 2019-10-11 NOTE — PROGRESS NOTES
Hospitalist Progress Note Jason Saez MD 
Answering service: [phone] OR [phone] from in house phone Date of Service:  10/11/2019 NAME:  Juana Salinas :  3/18/1927 MRN:  [id number] Admission Summary:  
Patient is a 80year old Male medical history significant for HTN , CAD,and DDD who presents to the Emergency Department accompanied by Daughter via EMS with chief complaint of fever. Patient complains of subjective fever, decreased oral intake  increased weakness after he was discharged from rehab 3 days. 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 He denies chills , Shortness of breath , chest pain , palpitations , leg swelling , syncope or near syncope , headaches , seizures or change in mentation , urinary or bowel complains. 
   
Interval history / Subjective:  
Denies any pain, more sleepy today Ongoing copious amount of L hip wound drainage. Cultures now positive for staph. Assessment & Plan:  
 
Possible Bilateral pneumonia , HCAP - febrile on admission, however with bilateral very small infiltrates in setting of new LE edema and new onset CHF. Possible that findings on CT are due to CHF. - in the setting of fever ,leucocytosis and CXR concerning new bibasilar infiltrates - CT chest 10/ Small bilateral pleural effusions with areas of bilateral lower lobe and lingular atelectasis and bronchiectasis as above. No pulmonary edema or pneumothorax. - On single dose levofloxacin (will treat for total of 7 days) 
- blood culture NGTD  
- DC IVF  
- Cont to monitor  
  
Recent L Hip Fracture (recent admission), with new drainage ?purulent, suspcious that this may be source of original fever. - X-ray showed left femoral neck fracture 9/3 
- s/p Left hip hemiarthroplasty 2019 - Per ortho to aspirate joint. If appears septic will need washout.  
- CT of the hip with fluid collection. Concern that this may tract deeper. Ortho following for potential surgery - May need to restart staph, holding for now as patient is hemodynamically stable - ID consulted Acute CHF exacerbation. No known history of CHF. Pulmonary edema. NYHA class 3-4 on admission. H/o known CAD, ?ischemic. 
- Echo with mild to moderate CHF, EF 40-45% - On metoprolol 
- Daily lasix 
- Not on ACE/ARB due to CKD. - May require cardiology consult, will need to discuss with family given age. - I and O and daily weights.  
  
Bilateral bronchiectasis - possible  2/2 recurrent aspiration pneumonia  
- Not in acute exacerbation (no significant sputum production) - x-ray in 9/3 was unremarkable - No prior CT for comparison 
  
HTN  
- BP at goal  
- Cont home med's  
  
CAD with valvular replacement 
- Stable - Cont home ASA , BB Statins  
  
Increased Generalized weakness  
- likely from the above  
- PT/OT Metabolic encephalopathy - h/o dementia now with intermittent delirium - Add PM Seroquel Code status: FULL CODE.  
DVT prophylaxis: Lovenox PTA: home Baseline: Independent with walker Care Plan discussed with: Patient/Family Disposition: TBD Hospital Problems  Date Reviewed: 10/7/2019 Codes Class Noted POA * (Principal) Pneumonia ICD-10-CM: J18.9 ICD-9-CM: 223  10/6/2019 Yes Review of Systems: A comprehensive review of systems was negative except for that written in the HPI. Vital Signs:  
 Last 24hrs VS reviewed since prior progress note. Most recent are: 
Visit Vitals /62 (BP 1 Location: Right arm, BP Patient Position: At rest) Pulse 60 Temp 97.9 °F (36.6 °C) Resp 17 Ht 5' 10\" (1.778 m) Wt 61.7 kg (136 lb) SpO2 97% BMI 19.51 kg/m² No intake or output data in the 24 hours ending 10/11/19 4200 Physical Examination: Constitutional:  No acute distress, cooperative, pleasant, sleepy today ENT:  Oral mucous moist, oropharynx benign. Resp:  CTA bilaterally. No wheezing/rhonchi/rales. No accessory muscle use CV:  Regular rhythm, normal rate, no murmurs, gallops, rubs GI:  Soft, non distended, non tender. normoactive bowel sounds, no hepatosplenomegaly Musculoskeletal:  No edema, warm, 2+ pulses throughout, L hip with erythema, warmth and purulent/serosang drainage. Neurologic:  Moves all extremities. Answering question appropriately Skin:  Good turgor, no rashes or ulcers Data Review:  
 Review and/or order of clinical lab test 
Review and/or order of tests in the radiology section of CPT Review and/or order of tests in the medicine section of CPT Labs:  
 
Recent Labs 10/11/19 
2305 10/10/19 
4725 WBC 9.4 6.7 HGB 9.2* 8.8* HCT 28.8* 27.7*  
 238 Recent Labs 10/11/19 
8637 10/10/19 
8620 10/09/19 
0308 10/09/19 
2185  140 140  --   
K 3.7 4.1 3.7  --   
 109* 110*  --   
CO2 27 27 25  --   
BUN 23* 24* 26*  --   
CREA 1.51* 1.54* 1.34*  --   
* 91 94  --   
CA 8.8 9.1 8.5  --   
MG 1.8 2.0  --  2.0 PHOS 2.7 2.6  --  2.5* No results for input(s): SGOT, GPT, ALT, AP, TBIL, TBILI, TP, ALB, GLOB, GGT, AML, LPSE in the last 72 hours. No lab exists for component: AMYP, HLPSE No results for input(s): INR, PTP, APTT, INREXT, INREXT in the last 72 hours. No results for input(s): FE, TIBC, PSAT, FERR in the last 72 hours. No results found for: FOL, RBCF No results for input(s): PH, PCO2, PO2 in the last 72 hours. No results for input(s): CPK, CKNDX, TROIQ in the last 72 hours. No lab exists for component: CPKMB Lab Results Component Value Date/Time  Cholesterol, total 127 02/19/2010 11:45 AM  
 HDL Cholesterol 55 02/19/2010 11:45 AM  
 LDL, calculated 63.6 02/19/2010 11:45 AM  
 Triglyceride 42 02/19/2010 11:45 AM  
 CHOL/HDL Ratio 2.3 02/19/2010 11:45 AM  
 
Lab Results Component Value Date/Time Glucose (POC) 109 (H) 10/06/2019 06:40 PM  
 Glucose (POC) 91 09/04/2019 02:27 PM  
 Glucose (POC) 94 09/03/2019 03:20 PM  
 
Lab Results Component Value Date/Time Color YELLOW/STRAW 10/06/2019 05:29 PM  
 Appearance CLEAR 10/06/2019 05:29 PM  
 Specific gravity 1.020 10/06/2019 05:29 PM  
 pH (UA) 6.5 10/06/2019 05:29 PM  
 Protein 30 (A) 10/06/2019 05:29 PM  
 Glucose NEGATIVE  10/06/2019 05:29 PM  
 Ketone NEGATIVE  10/06/2019 05:29 PM  
 Bilirubin NEGATIVE  10/06/2019 05:29 PM  
 Urobilinogen 1.0 10/06/2019 05:29 PM  
 Nitrites NEGATIVE  10/06/2019 05:29 PM  
 Leukocyte Esterase NEGATIVE  10/06/2019 05:29 PM  
 Epithelial cells FEW 10/06/2019 05:29 PM  
 Bacteria NEGATIVE  10/06/2019 05:29 PM  
 WBC 0-4 10/06/2019 05:29 PM  
 RBC 0-5 10/06/2019 05:29 PM  
 
 
 
Medications Reviewed:  
 
Current Facility-Administered Medications Medication Dose Route Frequency  traMADol (ULTRAM) tablet 50 mg  50 mg Oral Q6H PRN  
 levoFLOXacin (LEVAQUIN) tablet 750 mg  750 mg Oral Q48H  
 balsam peru-castor oil (VENELEX) ointment   Topical BID  furosemide (LASIX) injection 20 mg  20 mg IntraVENous DAILY  hydrALAZINE (APRESOLINE) 20 mg/mL injection 10 mg  10 mg IntraVENous Q6H PRN  
 acetaminophen (TYLENOL) tablet 650 mg  650 mg Oral Q8H  
 aspirin delayed-release tablet 325 mg  325 mg Oral DAILY  calcium-vitamin D (OS-OLEKSANDR) 500 mg-200 unit tablet  1 Tab Oral TID WITH MEALS  docusate sodium (COLACE) capsule 100 mg  100 mg Oral BID PRN  
 metoprolol tartrate (LOPRESSOR) tablet 50 mg  50 mg Oral BID  sodium chloride (NS) flush 5-40 mL  5-40 mL IntraVENous Q8H  
 sodium chloride (NS) flush 5-40 mL  5-40 mL IntraVENous PRN  
 morphine injection 1 mg  1 mg IntraVENous Q4H PRN  
 ondansetron (ZOFRAN) injection 4 mg  4 mg IntraVENous Q4H PRN  
 [Held by provider] enoxaparin (LOVENOX) injection 40 mg  40 mg SubCUTAneous Q24H  
 
______________________________________________________________________ EXPECTED LENGTH OF STAY: 4d 2h 
ACTUAL LENGTH OF STAY:          5 
 
            
Kimo Moore MD

## 2019-10-11 NOTE — PROGRESS NOTES
Rickie NP Progress note Name: Ju Sanchez YOB: 1927 MRN: [id number] Admission Date: 10/6/2019  5:14 PM 
 
Date of service: 10/11/2019 12:50 AM 
 
Overnight Update:  
  
 
Complaint:  Pt attempting unsafe mobility, combative with staff Sub/Obj:  RN reports pt is confused at baseline, but tonight is continuing to attempt to get OOB and when nursing tries to redirect, he gets extremely agitated and combative. Plan:  Give haldol 3mg IV x1 dose now Cristi Valera NP 
[phone] or [employer]

## 2019-10-11 NOTE — PROGRESS NOTES
Occupational Therapy: Patient received in bed with daughter present. Patient declining OOB or exercises at this time, reporting fatigue and left hip pain with mobility. Daughter reports Dr Rama Park in recently and stating he will take patient to OR tomorrow morining treatment to left hip. Will follow.  
KAVITA Yin/FINN

## 2019-10-12 NOTE — PROGRESS NOTES
Day #1 of Cefazolin Indication: bone/joint infection Current regimen:  2 gm q8h Recent Labs 10/12/19 
0304 10/11/19 
5099 10/10/19 
6233 WBC 7.1 9.4 6.7 CREA 1.56* 1.51* 1.54* BUN 24* 23* 24* Est CrCl: 26 ml/min Temp (24hrs), Av °F (36.7 °C), Min:97.4 °F (36.3 °C), Max:99 °F (37.2 °C) Plan: Change to 1 gm q12h for  CrCl 11-34 ml/min

## 2019-10-12 NOTE — CONSULTS
ID consult received Patient in OR Consult to be done later as unable to see patient now Royce Christianson DO 
9:31 AM

## 2019-10-12 NOTE — PERIOP NOTES
TRANSFER - OUT REPORT: 
 
Verbal report given to Erick Richards (name) on [facility]  being transferred to [facility] (unit) for routine post - op Report consisted of patients Situation, Background, Assessment and  
Recommendations(SBAR). Time Pre op antibiotic given:2 gm Ancef at 9am 
Anesthesia Stop time: 8798 Lucero Present on Transfer to floor:n/a Order for Lucero on Chart:n/a 
Discharge Prescriptions with Chart:n/a Information from the following report(s) OR Summary, Intake/Output and MAR was reviewed with the receiving nurse. Opportunity for questions and clarification was provided. Is the patient on 02? YES 
     L/Min 2 Other n/a Is the patient on a monitor? NO Is the nurse transporting with the patient? YES Surgical Waiting Area notified of patient's transfer from PACU? YES The following personal items collected during your admission accompanied patient upon transfer:  
Dental Appliance: Dental Appliances: At bedside, Uppers, Lowers Vision: Visual Aid: Glasses, At bedside Hearing Aid:   
Jewelry: Jewelry: None Clothing: Clothing: Slippers Other Valuables: Other Valuables: None Valuables sent to safe:

## 2019-10-12 NOTE — PROGRESS NOTES
JYOTI: 
 
Discharge to [facility] SNF vs return home with [facility] and family support. Noted [facility] consult, pt was in OR today. Unit CM will need to follow for progression of care. ID has also been consulted and pt may need long term IV abx which will also affect discharge plan.  
 
RHONDA Loya

## 2019-10-12 NOTE — ROUTINE PROCESS
Jason Parker for report on Ángel Hernandez. Spoke w/  his nurse Latasha. Report Received Pt's daughter is coming down to sign consent and speak to Dr. Spenser Ibrahim.

## 2019-10-12 NOTE — PROGRESS NOTES
Ortho Update Discussed with Dr. Angélica Galloway Plan to I&D hip tomorrow He has discussed with patient's daughter NPO after midnight

## 2019-10-12 NOTE — ANESTHESIA PREPROCEDURE EVALUATION
Relevant Problems No relevant active problems Anesthetic History No history of anesthetic complications Review of Systems / Medical History Patient summary reviewed, nursing notes reviewed and pertinent labs reviewed Pulmonary Within defined limits Neuro/Psych Within defined limits Cardiovascular Hypertension Valvular problems/murmurs CHF Dysrhythmias : atrial fibrillation CAD and CABG Comments: S/P AVR 
EF 40-45% GI/Hepatic/Renal 
Within defined limits Endo/Other Arthritis Other Findings Physical Exam 
 
Airway Mallampati: II 
TM Distance: > 6 cm Neck ROM: normal range of motion Mouth opening: Normal 
 
 Cardiovascular Regular rate and rhythm,  S1 and S2 normal,  no murmur, click, rub, or gallop Dental 
 
Dentition: Edentulous Pulmonary Breath sounds clear to auscultation Abdominal 
GI exam deferred Other Findings Anesthetic Plan ASA: 3 Anesthesia type: general 
 
 
 
 
Induction: Intravenous Anesthetic plan and risks discussed with: Patient and Son / Daughter

## 2019-10-12 NOTE — PERIOP NOTES
Patient's daughter Everett Gallo updated. Pt is very sleeping, blood pressure low call out to Dr. Baldo Vaughan

## 2019-10-12 NOTE — OP NOTES
[address]  
OPERATIVE REPORT Name:  John Antonio 
MR#:  [id number] :  1927 ACCOUNT #:  [de-identified] DATE OF SERVICE:  10/12/2019 PREOPERATIVE DIAGNOSIS:  Surgical site infection, left hip. POSTOPERATIVE DIAGNOSIS:  Deep infection, left hip. PROCEDURE PERFORMED:  Resection of arthroplasty, left hip, with placement of antibiotic spacer. SURGEON:  Truman Barnes MD 
 
ASSISTANT:  [facility] staff. ANESTHESIA:  General. 
 
COMPLICATIONS:  None. SPECIMENS REMOVED:  Fluid sample sent for culture. IMPLANTS:  Included DePuy Tr[place]dale size 5 stem coated with vancomycin and gentamicin impregnated cement with a +8.5 inner head and 50 mm outer head ball. ESTIMATED BLOOD LOSS:  250. DRAINS:  None. INDICATIONS FOR PROCEDURE:  This is a 80-year-old gentleman who underwent bipolar hemiarthroplasty about 5 weeks ago by one of my partners. He was readmitted for possible pneumonia and fevers. His lung findings were somewhat equivocal; however, he continued to complain of hip pain and active drainage. The drainage had been present for several weeks. This was cultured and shows Staph epidermidis. We obtained a CT scan, which revealed fluid collection that appeared only to be superficial.  We discussed risks and benefits of I and D versus possible resection arthroplasty. They understood and wished to proceed. DESCRIPTION OF PROCEDURE:  The patient was identified in the preoperative holding area, the correct site was marked and confirmed. He was taken to the operating room and general endotracheal anesthesia was induced after he was prepped and draped. He received 2 g of cefazolin prior to incision. Time-out was held and correct site was confirmed. I ellipsed out his previous incision. There was active purulent drainage.   There was a 2-cm defect in the IT band and this drainage continued deep to surround the implant, which was cloudy. I cultured this. The decision was made for resection. I removed all previous sutures. I then dislocated the hip and removed the head ball. I disrupted the bony interspace proximally and I was able to remove the stem with relative ease without fracture. I then cleared the soft tissue from inside of the acetabulum, but left the labrum. Because he is 80years old, I elected not to ream the acetabulum and the cartilage still appeared intact. I elected to proceed with bipolar instead to decrease the dislocation risk and because of his poor quality bone. I used the dilute Betadine soak. I then used another 3 liters of normal saline with bacitracin. I prepped the femur for a size 5. I then opened the size 5 implant and mixed 1 batch of cement with gentamicin and vancomycin. I coated the stem completely and allowed this to dry and harden. I then used first generation cement technique to cement this into place. I re-trialed up to +8.5 head. The outer diameter measured 50 mm, which was 3 mm smaller than the previous head, but this was the only size that I could get to seat appropriately. I then reduced the hip and was happy with our stability. I performed another washout, changed gloves, changed the drapes prior to putting the implant in. I then closed the capsule with #1 PDS. I closed the IT band with #1 PDS followed by Stratafix. The subcutaneous tissue was closed with 2-0 PDS followed by 3-0 nylon. The patient was taken to the PACU in stable condition. Yina Jesus MD [id number] 
D:  10/12/2019 11:31 
T:  10/12/2019 13:41 JOB #:  [id number]

## 2019-10-12 NOTE — PROGRESS NOTES
Pharmacist Note - Vancomycin Dosing Consult provided for this 80 y.o. male for indication of MRSE prosthetic joint infection. Recent Labs 10/12/19 
0304 10/11/19 
2517 10/10/19 
2454 WBC 7.1 9.4 6.7 CREA 1.56* 1.51* 1.54* BUN 24* 23* 24* Frequency of BMP: x 1 tomorrow Height: 177.8 cm Weight: 661.7 kg Est CrCl: 26 ml/min Temp (24hrs), Av.2 °F (36.8 °C), Min:97.5 °F (36.4 °C), Max:99 °F (37.2 °C) Cultures: 
10/6 blood - NG, final  
10/9 joint fluid - Staph epidermidis (oxacillin sensitive) Goal trough = 15 - 20 mcg/mL Therapy will be initiated with a 1000 mg IV x 1 with subsequent dosing by levels due to advanced age and renal function. The first random level will be tomorrow with AM labs. Pharmacy to follow patient daily and order levels / make dose adjustments as appropriate.

## 2019-10-12 NOTE — PROGRESS NOTES
Problem: Pressure Injury - Risk of 
Goal: *Prevention of pressure injury Description Document Mark Scale and appropriate interventions in the flowsheet. Outcome: Progressing Towards Goal 
Note:  
Pressure Injury Interventions: 
Sensory Interventions: Assess changes in LOC Moisture Interventions: Minimize layers, Absorbent underpads Activity Interventions: Increase time out of bed Mobility Interventions: Float heels Nutrition Interventions: Document food/fluid/supplement intake Friction and Shear Interventions: Lift sheet, Minimize layers Problem: Patient Education: Go to Patient Education Activity Goal: Patient/Family Education Outcome: Progressing Towards Goal 
  
Problem: Falls - Risk of 
Goal: *Absence of Falls Description Document Olivia Carpio Fall Risk and appropriate interventions in the flowsheet. Outcome: Progressing Towards Goal 
Note:  
Fall Risk Interventions: 
Mobility Interventions: Bed/chair exit alarm Mentation Interventions: Bed/chair exit alarm Medication Interventions: Bed/chair exit alarm Elimination Interventions: Call light in reach History of Falls Interventions: Bed/chair exit alarm Problem: Patient Education: Go to Patient Education Activity Goal: Patient/Family Education Outcome: Progressing Towards Goal 
  
Problem: Patient Education: Go to Patient Education Activity Goal: Patient/Family Education Outcome: Progressing Towards Goal 
  
Problem: Patient Education: Go to Patient Education Activity Goal: Patient/Family Education Outcome: Progressing Towards Goal 
  
Problem: Patient Education: Go to Patient Education Activity Goal: Patient/Family Education Outcome: Progressing Towards Goal 
  
Problem: Pneumonia: Day 1 Goal: Off Pathway (Use only if patient is Off Pathway) Outcome: Progressing Towards Goal 
Goal: Activity/Safety Outcome: Progressing Towards Goal 
Goal: Consults, if ordered Outcome: Progressing Towards Goal 
 Goal: Diagnostic Test/Procedures Outcome: Progressing Towards Goal 
Goal: Nutrition/Diet Outcome: Progressing Towards Goal 
Goal: Medications Outcome: Progressing Towards Goal 
Goal: Respiratory Outcome: Progressing Towards Goal 
Goal: Treatments/Interventions/Procedures Outcome: Progressing Towards Goal 
Goal: Psychosocial 
Outcome: Progressing Towards Goal 
Goal: *Oxygen saturation within defined limits Outcome: Progressing Towards Goal 
Goal: *Influenza vaccine administered (October-March) Outcome: Progressing Towards Goal 
Goal: *Pneumoccocal vaccine administered Outcome: Progressing Towards Goal 
Goal: *Hemodynamically stable Outcome: Progressing Towards Goal 
Goal: *Demonstrates progressive activity Outcome: Progressing Towards Goal 
Goal: *Tolerating diet Outcome: Progressing Towards Goal 
  
Problem: Pneumonia: Day 2 Goal: Off Pathway (Use only if patient is Off Pathway) Outcome: Progressing Towards Goal 
Goal: Activity/Safety Outcome: Progressing Towards Goal 
Goal: Consults, if ordered Outcome: Progressing Towards Goal 
Goal: Diagnostic Test/Procedures Outcome: Progressing Towards Goal 
Goal: Nutrition/Diet Outcome: Progressing Towards Goal 
Goal: Discharge Planning Outcome: Progressing Towards Goal 
Goal: Medications Outcome: Progressing Towards Goal 
Goal: Respiratory Outcome: Progressing Towards Goal 
Goal: Treatments/Interventions/Procedures Outcome: Progressing Towards Goal 
Goal: Psychosocial 
Outcome: Progressing Towards Goal 
Goal: *Oxygen saturation within defined limits Outcome: Progressing Towards Goal 
Goal: *Hemodynamically stable Outcome: Progressing Towards Goal 
Goal: *Demonstrates progressive activity Outcome: Progressing Towards Goal 
Goal: *Tolerating diet Outcome: Progressing Towards Goal 
Goal: *Optimal pain control at patient's stated goal 
Outcome: Progressing Towards Goal 
  
Problem: Pneumonia: Day 3 Goal: Off Pathway (Use only if patient is Off Pathway) Outcome: Progressing Towards Goal 
Goal: Activity/Safety Outcome: Progressing Towards Goal 
Goal: Consults, if ordered Outcome: Progressing Towards Goal 
Goal: Diagnostic Test/Procedures Outcome: Progressing Towards Goal 
Goal: Nutrition/Diet Outcome: Progressing Towards Goal 
Goal: Discharge Planning Outcome: Progressing Towards Goal 
Goal: Medications Outcome: Progressing Towards Goal 
Goal: Respiratory Outcome: Progressing Towards Goal 
Goal: Treatments/Interventions/Procedures Outcome: Progressing Towards Goal 
Goal: Psychosocial 
Outcome: Progressing Towards Goal 
Goal: *Oxygen saturation within defined limits Outcome: Progressing Towards Goal 
Goal: *Hemodynamically stable Outcome: Progressing Towards Goal 
Goal: *Demonstrates progressive activity Outcome: Progressing Towards Goal 
Goal: *Tolerating diet Outcome: Progressing Towards Goal 
Goal: *Describes available resources and support systems Outcome: Progressing Towards Goal 
Goal: *Optimal pain control at patient's stated goal 
Outcome: Progressing Towards Goal 
  
Problem: Pneumonia: Day 4 Goal: Off Pathway (Use only if patient is Off Pathway) Outcome: Progressing Towards Goal 
Goal: Activity/Safety Outcome: Progressing Towards Goal 
Goal: Nutrition/Diet Outcome: Progressing Towards Goal 
Goal: Discharge Planning Outcome: Progressing Towards Goal 
Goal: Medications Outcome: Progressing Towards Goal 
Goal: Respiratory Outcome: Progressing Towards Goal 
Goal: Treatments/Interventions/Procedures Outcome: Progressing Towards Goal 
Goal: Psychosocial 
Outcome: Progressing Towards Goal 
  
Problem: Pneumonia: Discharge Outcomes Goal: *Demonstrates progressive activity Outcome: Progressing Towards Goal 
Goal: *Describes follow-up/return visits to physicians Outcome: Progressing Towards Goal 
Goal: *Tolerating diet Outcome: Progressing Towards Goal 
Goal: *Verbalizes name, dosage, time, side effects, and number of days to continue medications Outcome: Progressing Towards Goal 
Goal: *Influenza immunization Outcome: Progressing Towards Goal 
Goal: *Pneumococcal immunization Outcome: Progressing Towards Goal 
Goal: *Respiratory status at baseline Outcome: Progressing Towards Goal 
Goal: *Vital signs within defined limits Outcome: Progressing Towards Goal 
Goal: *Describes available resources and support systems Outcome: Progressing Towards Goal 
Goal: *Optimal pain control at patient's stated goal 
Outcome: Progressing Towards Goal

## 2019-10-12 NOTE — PROGRESS NOTES
Hospitalist Progress Note Almas Abad MD 
Answering service: [phone] OR [phone] from in house phone Date of Service:  10/12/2019 NAME:  Be Bernal :  3/18/1927 MRN:  [id number] Admission Summary:  
Patient is a 80year old Male medical history significant for HTN , CAD,and DDD who presents to the Emergency Department accompanied by Daughter via EMS with chief complaint of fever. Patient complains of subjective fever, decreased oral intake  increased weakness after he was discharged from rehab 3 days. 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 He denies chills , Shortness of breath , chest pain , palpitations , leg swelling , syncope or near syncope , headaches , seizures or change in mentation , urinary or bowel complains. 
   
Interval history / Subjective:  
Denies any pain, more sleepy today Hip debridement today, seen in the PACU, sleepy, s/p partial arthroplasty. Assessment & Plan:  
 
Recent L Hip Fracture (recent admission), with new drainage ?purulent, suspcious that this may be source of original fever. - X-ray showed left femoral neck fracture 9/3 
- s/p Left hip hemiarthroplasty 2019 
- Per ortho to aspirate joint. If appears septic will need washout.  
- CT of the hip with fluid collection. Concern that this may tract deeper. Ortho following for potential surgery - OR today 10/12 
- ID consulted, would resume vanc depending on outcome of surgery Possible Bilateral pneumonia , HCAP - febrile on admission, however with bilateral very small infiltrates in setting of new LE edema and new onset CHF. Possible that findings on CT are due to CHF. - in the setting of fever ,leucocytosis and CXR concerning new bibasilar infiltrates - Narrowed to levofloxacin (will treat for total of 7 days) - last day 10/12 - blood culture NGTD  
- DC IVF  
- Cont to monitor  
  
Acute CHF exacerbation. No known history of CHF. Pulmonary edema. NYHA class 3-4 on admission. H/o known CAD, ?ischemic. 
- Echo with mild to moderate CHF, EF 40-45% - On metoprolol 
- Daily lasix 
- Not on ACE/ARB due to CKD. - May require cardiology consult, will need to discuss with family given age. - I and O and daily weights.  
  
Bilateral bronchiectasis - possible  2/2 recurrent aspiration pneumonia  
- Not in acute exacerbation (no significant sputum production) - x-ray in 9/3 was unremarkable - No prior CT for comparison 
  
HTN  
- BP at goal  
- Cont home med's  
  
CAD with valvular replacement 
- Stable - Cont home ASA , BB Statins  
  
Increased Generalized weakness  
- likely from the above  
- PT/OT Metabolic encephalopathy - h/o dementia now with intermittent delirium - Add PM Seroquel Code status: FULL CODE.  
DVT prophylaxis: Lovenox PTA: home Baseline: Independent with walker Care Plan discussed with: Patient/Family Disposition: TBD Hospital Problems  Date Reviewed: 10/7/2019 Codes Class Noted POA * (Principal) Pneumonia ICD-10-CM: J18.9 ICD-9-CM: 497  10/6/2019 Yes Review of Systems: A comprehensive review of systems was negative except for that written in the HPI. Vital Signs:  
 Last 24hrs VS reviewed since prior progress note. Most recent are: 
Visit Vitals BP (!) 105/38 (BP 1 Location: Left arm, BP Patient Position: Head of bed elevated (Comment degrees)) Pulse (!) 55 Temp 98.4 °F (36.9 °C) Resp 19 Ht 5' 10\" (1.778 m) Wt 61.7 kg (136 lb) SpO2 98% BMI 19.51 kg/m² Intake/Output Summary (Last 24 hours) at 10/12/2019 1315 Last data filed at 10/12/2019 1107 Gross per 24 hour Intake 800 ml Output 250 ml Net 550 ml Physical Examination:  
 
 
     
Constitutional:  No acute distress, Sleepy post op ENT:  Oral mucousa dry, oropharynx benign. Resp:  CTA bilaterally. No wheezing/rhonchi/rales. No accessory muscle use CV:  Regular rhythm, normal rate, no murmurs, gallops, rubs GI:  Soft, non distended, non tender. normoactive bowel sounds, no hepatosplenomegaly Musculoskeletal:  No edema, warm, 2+ pulses throughout, L hip with erythema, warmth and purulent/serosang drainage. Neurologic:  Sleepy post OR Skin:  Good turgor, no rashes or ulcers Data Review:  
 Review and/or order of clinical lab test 
Review and/or order of tests in the radiology section of CPT Review and/or order of tests in the medicine section of CPT Labs:  
 
Recent Labs 10/12/19 
0304 10/11/19 
9218 WBC 7.1 9.4 HGB 8.4* 9.2* HCT 27.0* 28.8*  
 262 Recent Labs 10/12/19 
0304 10/11/19 
1085 10/10/19 
0210  140 140  
K 3.7 3.7 4.1  107 109* CO2 30 27 27 BUN 24* 23* 24* CREA 1.56* 1.51* 1.54* GLU 96 114* 91  
CA 8.8 8.8 9.1 MG 1.8 1.8 2.0 PHOS 2.4* 2.7 2.6 No results for input(s): SGOT, GPT, ALT, AP, TBIL, TBILI, TP, ALB, GLOB, GGT, AML, LPSE in the last 72 hours. No lab exists for component: AMYP, HLPSE No results for input(s): INR, PTP, APTT, INREXT, INREXT in the last 72 hours. No results for input(s): FE, TIBC, PSAT, FERR in the last 72 hours. No results found for: FOL, RBCF No results for input(s): PH, PCO2, PO2 in the last 72 hours. No results for input(s): CPK, CKNDX, TROIQ in the last 72 hours. No lab exists for component: CPKMB Lab Results Component Value Date/Time Cholesterol, total 127 02/19/2010 11:45 AM  
 HDL Cholesterol 55 02/19/2010 11:45 AM  
 LDL, calculated 63.6 02/19/2010 11:45 AM  
 Triglyceride 42 02/19/2010 11:45 AM  
 CHOL/HDL Ratio 2.3 02/19/2010 11:45 AM  
 
Lab Results Component Value Date/Time  Glucose (POC) 109 (H) 10/06/2019 06:40 PM  
 Glucose (POC) 91 09/04/2019 02:27 PM  
 Glucose (POC) 94 09/03/2019 03:20 PM  
 
Lab Results Component Value Date/Time Color YELLOW/STRAW 10/06/2019 05:29 PM  
 Appearance CLEAR 10/06/2019 05:29 PM  
 Specific gravity 1.020 10/06/2019 05:29 PM  
 pH (UA) 6.5 10/06/2019 05:29 PM  
 Protein 30 (A) 10/06/2019 05:29 PM  
 Glucose NEGATIVE  10/06/2019 05:29 PM  
 Ketone NEGATIVE  10/06/2019 05:29 PM  
 Bilirubin NEGATIVE  10/06/2019 05:29 PM  
 Urobilinogen 1.0 10/06/2019 05:29 PM  
 Nitrites NEGATIVE  10/06/2019 05:29 PM  
 Leukocyte Esterase NEGATIVE  10/06/2019 05:29 PM  
 Epithelial cells FEW 10/06/2019 05:29 PM  
 Bacteria NEGATIVE  10/06/2019 05:29 PM  
 WBC 0-4 10/06/2019 05:29 PM  
 RBC 0-5 10/06/2019 05:29 PM  
 
 
 
Medications Reviewed:  
 
Current Facility-Administered Medications Medication Dose Route Frequency  lactated Ringers infusion 1,000 mL  1,000 mL IntraVENous CONTINUOUS  
 0.9% sodium chloride infusion  25 mL/hr IntraVENous CONTINUOUS  
 lidocaine (PF) (XYLOCAINE) 10 mg/mL (1 %) injection 0.1 mL  0.1 mL SubCUTAneous PRN  
 fentaNYL citrate (PF) injection 50 mcg  50 mcg IntraVENous PRN  
 midazolam (VERSED) injection 1 mg  1 mg IntraVENous PRN  
 midazolam (VERSED) injection 1 mg  1 mg IntraVENous PRN  
 acetaminophen (TYLENOL) tablet 650 mg  650 mg Oral ONCE  
 ropivacaine (PF) (NAROPIN) 5 mg/mL (0.5 %) injection 150 mg  150 mg Peripheral Nerve Block PRN  
 ceFAZolin (ANCEF) in sterile water 2 gram/20 mL IV syringe  traMADol (ULTRAM) tablet 50 mg  50 mg Oral Q6H PRN  
 QUEtiapine (SEROquel) tablet 25 mg  25 mg Oral QHS  levoFLOXacin (LEVAQUIN) tablet 750 mg  750 mg Oral Q48H  
 balsam peru-castor oil (VENELEX) ointment   Topical BID  [Held by provider] furosemide (LASIX) injection 20 mg  20 mg IntraVENous DAILY  hydrALAZINE (APRESOLINE) 20 mg/mL injection 10 mg  10 mg IntraVENous Q6H PRN  
 acetaminophen (TYLENOL) tablet 650 mg  650 mg Oral Q8H  
  aspirin delayed-release tablet 325 mg  325 mg Oral DAILY  calcium-vitamin D (OS-OLEKSANDR) 500 mg-200 unit tablet  1 Tab Oral TID WITH MEALS  docusate sodium (COLACE) capsule 100 mg  100 mg Oral BID PRN  
 metoprolol tartrate (LOPRESSOR) tablet 50 mg  50 mg Oral BID  sodium chloride (NS) flush 5-40 mL  5-40 mL IntraVENous Q8H  
 sodium chloride (NS) flush 5-40 mL  5-40 mL IntraVENous PRN  
 morphine injection 1 mg  1 mg IntraVENous Q4H PRN  
 ondansetron (ZOFRAN) injection 4 mg  4 mg IntraVENous Q4H PRN  
 [Held by provider] enoxaparin (LOVENOX) injection 40 mg  40 mg SubCUTAneous Q24H  
 
______________________________________________________________________ EXPECTED LENGTH OF STAY: 4d 2h 
ACTUAL LENGTH OF STAY:          6 
 
            
Jana Wasserman MD

## 2019-10-12 NOTE — PERIOP NOTES
Dr. Freddie Lopez returned call. Orders received to transfuse 1 unit of blood. Ok to transfuse when patient gets to floor

## 2019-10-12 NOTE — ANESTHESIA POSTPROCEDURE EVALUATION
Post-Anesthesia Evaluation and Assessment Patient: Alicia Head MRN: [id number]  SSN: [id number] YOB: 1927  Age: 80 y.o. Sex: male I have evaluated the patient and they are stable and ready for discharge from the PACU. Cardiovascular Function/Vital Signs Visit Vitals /41 Pulse (!) 58 Temp (P) 36.9 °C (98.4 °F) Resp 20 Ht 5' 10\" (1.778 m) Wt 61.7 kg (136 lb) SpO2 100% BMI 19.51 kg/m² Patient is status post General anesthesia for Procedure(s): 
I&D LEFT HIP AND RESECTION ARTHROPLASTY. Nausea/Vomiting: None Postoperative hydration reviewed and adequate. Pain: 
Pain Scale 1: (P) Visual (10/12/19 1140) Pain Intensity 1: (P) 0 (10/12/19 1140) Managed Neurological Status:  
Neuro (WDL): (P) Exceptions to WDL (10/12/19 1140) Neuro Neurologic State: (P) Sleeping (10/12/19 1140) Orientation Level: Disoriented to person (10/11/19 2100) Cognition: Follows commands (10/11/19 2100) Speech: Clear (10/11/19 2100) LUE Motor Response: Weak (10/11/19 2100) LLE Motor Response: Weak (10/11/19 2100) RUE Motor Response: Weak (10/11/19 2100) RLE Motor Response: Weak (10/11/19 2100) At baseline Mental Status, Level of Consciousness: Alert and  oriented to person, place, and time Pulmonary Status:  
O2 Device: (P) CO2 nasal cannula (10/12/19 1208) Adequate oxygenation and airway patent Complications related to anesthesia: None Post-anesthesia assessment completed. No concerns Signed By: Patricia Parra MD   
 October 12, 2019 Procedure(s): 
I&D LEFT HIP AND RESECTION ARTHROPLASTY. general 
 
<BSHSIANPOST> Vitals Value Taken Time /39 10/12/2019 12:00 PM  
Temp Pulse 57 10/12/2019 12:14 PM  
Resp 22 10/12/2019 12:14 PM  
SpO2 100 % 10/12/2019 12:14 PM  
Vitals shown include unvalidated device data.

## 2019-10-12 NOTE — CONSULTS
Infectious Disease Consult Note Reason for Consult: Prosthetic joint hip infection Date of Consultation: October 12, 2019 Date of Admission: 10/6/2019 Referring Physician: Romayne Connors HPI: 
 
UNABLE TO OBTAIN MUCH INFORMATION FROM PATIENT WHO IS CONFUSED AND POST OP 
D/w NURSING STAFF Mr Janice Currie is a 80year old gentleman wt hx of prosthetic AV, femur fracture S/P L hip hemiarthroplasty Sep 2019, CAD wt CABG, admitted on 10/6/19 with fever, and weakness. From review of records, there were concerns of pneumonia and he has been on Levaquin. No cough noted per nursing. Patient denied cough as well. Reports of discharge from L hip noted in notes. From chart review: He had it aspirated per notes on 10/6/19. No fluid counts noted but cultures now resulted as MSSE. He received a dose of Vancomycin IV initially on admission 10/6/19  and also Zosyn 10/6-10/10/19. He has since been on Levaquin from 10/6/19. He received Cefazolin perioperatively He had resection of arthroplasty, left hip, with placement of antibiotic spacer WBC on admission 11.8 and he ad a fever of 101.1 on admission. From today's operative report, \" There was active purulent drainage. There was a 2-cm defect in the IT band and this drainage continued deep to surround the implant, which was cloudy\". Cultures pending for today's surgery. I called his daughter to obtain further history as unable to obtain history from patient at this time. Per his daughter, her dad was at rehab [facility])  Until 10/2 or 10/3/19 after last surgery in Sep for hip arthroplasty. He was complaining of hip hurting but was doing PT/OT. He went home around 10/3/19 and around 10/6//19 had fever. Daughter says that it looked red around the L hip site. She did not see any drainage. 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  Cognitively, she says he has not been the same since his initial hip surgery. Past Medical History: 
Past Medical History:  
Diagnosis Date  CAD (coronary artery disease)  DDD (degenerative disc disease), lumbar  Sciatica Surgical History: 
Past Surgical History:  
Procedure Laterality Date  HX CORONARY ARTERY BYPASS GRAFT Family History:  
Per daughter, he never knew his father Patient's mother has breast cancer Social History: · Living Situation was living at home by himself , has 2 daughter , wife  · Tobacco:  Denied · Alcohol: occasional  
· Illicit Drugs : denied · Sexual History: past  
· Travel History: none recently · Exposures:  
· Outdoor/Hiking: denied · Animal/Pet: none · Allergies: 
No Known Allergies Review of Systems: 
 
Unable to obtain full ROS as patient confused now and post anesthesia Attempted full 10 point ROS He admits to pain in hip Otherwise per HPI, and all others negative Medications: No current facility-administered medications on file prior to encounter. Current Outpatient Medications on File Prior to Encounter Medication Sig Dispense Refill  docusate sodium (COLACE) 100 mg capsule Take 1 Cap by mouth two (2) times daily as needed for Constipation for up to 90 days. 60 Cap 0  
 calcium-vitamin D (OYSTER SHELL) 500 mg(1,250mg) -200 unit per tablet Take 1 Tab by mouth three (3) times daily (with meals). 90 Tab 0  
 metoprolol tartrate (LOPRESSOR) 50 mg tablet Take 1 Tab by mouth two (2) times a day. 60 Tab 0  
 acetaminophen (TYLENOL) 500 mg tablet Take 1 Tab by mouth every six (6) hours. 60 Tab 0  
 aspirin delayed-release 325 mg tablet Take 325 mg by mouth daily.     
 
 
 
Current Facility-Administered Medications:  
  ceFAZolin (ANCEF) in sterile water 2 gram/20 mL IV syringe, , , , Billy Guevara MD 
  0.9% sodium chloride infusion, 125 mL/hr, IntraVENous, CONTINUOUS, Beltran Mills MD, Last Rate: 125 mL/hr at 10/12/19 1421, 125 mL/hr at 10/12/19 1421   sodium chloride (NS) flush 5-40 mL, 5-40 mL, IntraVENous, Q8H, Billy Guevara MD, 10 mL at 10/12/19 1423   sodium chloride (NS) flush 5-40 mL, 5-40 mL, IntraVENous, PRN, Yissel Guevara MD 
  traMADol (ULTRAM) tablet 50 mg, 50 mg, Oral, Q6H PRN, Yissel Guevraa MD 
  HYDROmorphone (PF) (DILAUDID) injection 0.5 mg, 0.5 mg, IntraVENous, Q4H PRN, Yissel Guevara MD 
  hydrOXYzine HCl (ATARAX) tablet 10 mg, 10 mg, Oral, Q8H PRN, Yissel Guevara MD 
  naloxone (NARCAN) injection 0.4 mg, 0.4 mg, IntraVENous, PRN, Yissel Guevara MD 
  ondansetron (ZOFRAN) injection 4 mg, 4 mg, IntraVENous, Q4H PRN, Yissel Guevara MD 
  senna-docusate (PERICOLACE) 8.6-50 mg per tablet 1 Tab, 1 Tab, Oral, BID, Beltran Mills MD, Stopped at 10/12/19 1800   [START ON 10/13/2019] polyethylene glycol (MIRALAX) packet 17 g, 17 g, Oral, DAILY, Yissel Guevara MD 
  [START ON 10/14/2019] bisacodyl (DULCOLAX) suppository 10 mg, 10 mg, Rectal, DAILY PRN, Yissel Guevara MD 
  aspirin delayed-release tablet 81 mg, 81 mg, Oral, BID, Yissel Guevara MD 
  0.9% sodium chloride infusion 250 mL, 250 mL, IntraVENous, PRN, Yissel Guevara MD 
  vancomycin (VANCOCIN) 1,000 mg in 0.9% sodium chloride (MBP/ADV) 250 mL, 1,000 mg, IntraVENous, ONCE, Lyssa Sanchez DO, Stopped at 10/12/19 1706   [START ON 10/13/2019] ceFAZolin (ANCEF) 1 g in 0.9% sodium chloride (MBP/ADV) 50 mL, 1 g, IntraVENous, Q12H, Lyssa Sanchez DO 
  QUEtiapine (SEROquel) tablet 25 mg, 25 mg, Oral, QHS, Billy Guevara MD, 25 mg at 10/11/19 2117   balsam peru-castor oil (VENELEX) ointment, , Topical, BID, Yissel Guevara MD 
  [Held by provider] furosemide (LASIX) injection 20 mg, 20 mg, IntraVENous, DAILY, Lucrecia Escalera MD, 20 mg at 10/11/19 1107   hydrALAZINE (APRESOLINE) 20 mg/mL injection 10 mg, 10 mg, IntraVENous, Q6H PRN, Fran Moses MD, 10 mg at 10/08/19 0423   acetaminophen (TYLENOL) tablet 650 mg, 650 mg, Oral, Q8H, El Guevara MD, Stopped at 10/12/19 0700   calcium-vitamin D (OS-OLEKSANDR) 500 mg-200 unit tablet, 1 Tab, Oral, TID WITH MEALS, El Guevara MD, Stopped at 10/12/19 0800 
  docusate sodium (COLACE) capsule 100 mg, 100 mg, Oral, BID PRN, El Guevara MD 
  metoprolol tartrate (LOPRESSOR) tablet 50 mg, 50 mg, Oral, BID, El Guevara MD, Stopped at 10/12/19 0900 Physical Exam: 
 
Vitals:  
Patient Vitals for the past 24 hrs: 
 Temp Pulse Resp BP SpO2  
10/12/19 1413 97.9 °F (36.6 °C) 68  129/51 96 % 10/12/19 1345  (!) 54 17 116/42 100 % 10/12/19 1330  (!) 53 18 125/41 100 % 10/12/19 1315  (!) 55 18 115/41 97 % 10/12/19 1300  (!) 56 19 (!) 111/39 96 % 10/12/19 1245  (!) 55 19 (!) 105/38 98 % 10/12/19 1230  (!) 57 19 106/40 96 % 10/12/19 1215  (!) 57 20 105/47 100 % 10/12/19 1208 98.4 °F (36.9 °C)      
10/12/19 1200  (!) 57 18 (!) 111/39 99 % 10/12/19 1150  (!) 58 20 117/41 100 % 10/12/19 1145  60 19 98/69 100 % 10/12/19 1140 99 °F (37.2 °C) 61 18 114/40 100 % 10/12/19 1139 99 °F (37.2 °C) 63 12 104/45 100 % 10/12/19 0754 97.5 °F (36.4 °C) 62 24 165/61 97 % 10/12/19 0259 97.7 °F (36.5 °C) 60 18 162/67 98 % 10/11/19 2111 98.1 °F (36.7 °C) (!) 55 18 168/67 99 % ·  
· GEN: NAD 
· HEENT: no scleral icterus,  no cervical no lymphadenopathy, no sinus tenderness, no thrush, edentulous, no thrush, dry oral mucosa · CV: S1, S2 heard regularly, JACQUELIN heard · Lungs: Clear to auscultation bilaterally anteriorly · Abdomen: soft, non distended, non tender, · Extremities: no edema · Neuro: Alert, oriented self, followed simple commands, overall somewhat confused · Skin: no rash · MSK: + L hip area post op dressing , no erythema seen around dressing Labs:  
Recent Results (from the past 24 hour(s)) MAGNESIUM  
 Collection Time: 10/12/19  3:04 AM  
Result Value Ref Range Magnesium 1.8 1.6 - 2.4 mg/dL METABOLIC PANEL, BASIC Collection Time: 10/12/19  3:04 AM  
Result Value Ref Range Sodium 140 136 - 145 mmol/L Potassium 3.7 3.5 - 5.1 mmol/L Chloride 108 97 - 108 mmol/L  
 CO2 30 21 - 32 mmol/L Anion gap 2 (L) 5 - 15 mmol/L Glucose 96 65 - 100 mg/dL BUN 24 (H) 6 - 20 MG/DL Creatinine 1.56 (H) 0.70 - 1.30 MG/DL  
 BUN/Creatinine ratio 15 12 - 20 GFR est AA 51 (L) >60 ml/min/1.73m2 GFR est non-AA 42 (L) >60 ml/min/1.73m2 Calcium 8.8 8.5 - 10.1 MG/DL  
PHOSPHORUS Collection Time: 10/12/19  3:04 AM  
Result Value Ref Range Phosphorus 2.4 (L) 2.6 - 4.7 MG/DL  
CBC WITH AUTOMATED DIFF Collection Time: 10/12/19  3:04 AM  
Result Value Ref Range WBC 7.1 4.1 - 11.1 K/uL  
 RBC 2.85 (L) 4.10 - 5.70 M/uL HGB 8.4 (L) 12.1 - 17.0 g/dL HCT 27.0 (L) 36.6 - 50.3 % MCV 94.7 80.0 - 99.0 FL  
 MCH 29.5 26.0 - 34.0 PG  
 MCHC 31.1 30.0 - 36.5 g/dL  
 RDW 13.0 11.5 - 14.5 % PLATELET 695 354 - 993 K/uL MPV 10.2 8.9 - 12.9 FL  
 NRBC 0.0 0  WBC ABSOLUTE NRBC 0.00 0.00 - 0.01 K/uL NEUTROPHILS 50 32 - 75 % LYMPHOCYTES 35 12 - 49 % MONOCYTES 10 5 - 13 % EOSINOPHILS 5 0 - 7 % BASOPHILS 0 0 - 1 % IMMATURE GRANULOCYTES 0 0.0 - 0.5 % ABS. NEUTROPHILS 3.5 1.8 - 8.0 K/UL  
 ABS. LYMPHOCYTES 2.5 0.8 - 3.5 K/UL  
 ABS. MONOCYTES 0.7 0.0 - 1.0 K/UL  
 ABS. EOSINOPHILS 0.3 0.0 - 0.4 K/UL  
 ABS. BASOPHILS 0.0 0.0 - 0.1 K/UL  
 ABS. IMM. GRANS. 0.0 0.00 - 0.04 K/UL  
 DF AUTOMATED    
TYPE & SCREEN Collection Time: 10/12/19  9:00 AM  
Result Value Ref Range Crossmatch Expiration 10/15/2019 ABO/Rh(D) O NEGATIVE Antibody screen NEG Unit number [id number] Blood component type  LR Unit division 00 Status of unit ALLOCATED Crossmatch result Compatible Microbiology Data: 
  
  Blood: 10/6/19 
    
 Component Value Ref Range & Units Status Special Requests: NO SPECIAL REQUESTS    Final  
Culture result: NO GROWTH 5 DAYS    Final  
Result History 10/9/19 joint fluid  
    
Component Value Ref Range & Units Status Special Requests: NO SPECIAL REQUESTS    Preliminary GRAM STAIN OCCASIONAL WBCS SEEN    Preliminary GRAM STAIN NO ORGANISMS SEEN    Preliminary Culture result:    Preliminary Culture performed on Fluid swab specimen Culture result: Abnormal     Preliminary RARE STAPHYLOCOCCUS EPIDERMIDIS Susceptibility Staphylococcus epidermidis JUAREZ (Preliminary) Ampicillin/sulbactam ($) <=8/4 ug/mL S Cefazolin ($) <=4 ug/mL S Ciprofloxacin ($) <=1 ug/mL S Clindamycin ($) <=0.5 ug/mL S Daptomycin ($$$$$) <=0.5 ug/mL S Erythromycin ($$$$) <=0.5 ug/mL S Gentamicin ($) <=4 ug/mL S Levofloxacin ($) <=1 ug/mL S Linezolid ($$$$$) <=1 ug/mL S Oxacillin <=0.25 ug/mL S Rifampin ($$$$) <=1 ug/mL S1 Tetracycline <=4 ug/mL S Trimeth/Sulfa <=0.5/9.5 u... S Vancomycin ($) 1 ug/mL S   
     
1 Rifampin is not to be used for mono-therapy. Pathology Results: 
 
Imaging: TTE 
 10/7/19 · Left Ventricle: Normal cavity size. Mild concentric hypertrophy. Mild-to-moderate systolic dysfunction. Estimated left ventricular ejection fraction is 41 - 45%. No regional wall motion abnormality noted. Moderate (grade 2) left ventricular diastolic dysfunction. · Left Atrium: Moderately dilated left atrium. · Right Atrium: Moderately dilated right atrium. · Interatrial Septum: Color flow Doppler was used. · Aortic Valve: Prosthetic aortic valve. Aortic valve mean gradient is 26 mmHg. 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 Prosthesis is normal. Prosthetic valve function is insufficient. There is mild paravalvular leaking. Mild to moderate aortic valve regurgitation is present. · Mitral Valve: Moderate mitral annular calcification.  Mild mitral valve regurgitation is present. · Tricuspid Valve: Mild tricuspid valve regurgitation is present. · Pulmonary Artery: There is no evidence of pulmonary hypertension. · IVC/Hepatic Veins: Severely elevated central venous pressure (15+ mmHg); IVC diameter is larger than 21 mm and collapses less than 50% with respiration. Echo Findings Left Ventricle Normal cavity size. Mild concentric hypertrophy. The muscle mass is normal. The cavity shape is normal. Mild-to-moderate systolic dysfunction. The estimated ejection fraction is 41 - 45%. No regional wall motion abnormality noted. End-systolic volume is normal. There is moderate (grade 2) left ventricular diastolic dysfunction. Normal left ventricular diastolic pressure. End-diastolic volume is normal.  
Wall Scoring The following segments are akinetic: apical inferior. All other segments are normal.  
  
  
  
Left Atrium Moderately dilated left atrium. No atrial septal defect present. Right Ventricle Normal cavity size, wall thickness and global systolic function. Right Atrium Moderately dilated right atrium. Interatrial Septum Color flow Doppler was used. No atrial septal defect present. No interatrial septal aneurysm present. Bryan Ace Aortic Valve Normal valve structure, trileaflet valve structure and no stenosis. Prosthetic aortic valve. Normal aortic leaflet mobility. Aortic valve mean gradient is 26 mmHg. Mild to moderate aortic valve regurgitation. There is a bioprosthetic valve present. Mild paravalvular, prosthetic valve insufficiency present. The prosthetic valve is normal.  
Mitral Valve No stenosis. Leaflet calcification. There is moderate anterior leaflet calcification diffusely. There is moderate posterior leaflet calcification diffusely. Normal mitral valve leaflet separation. Moderate mitral annular calcification. Mild regurgitation. Tricuspid Valve Normal valve structure and no stenosis.  Mild tricuspid valve regurgitation. There is no evidence of pulmonary hypertension. Pulmonic Valve Pulmonic valve not well visualized. Normal valve structure, no stenosis and no regurgitation. Aorta Normal aortic root, ascending aortic, and aortic arch. IVC/Hepatic Veins Severely elevated central venous pressure (15+ mmHg); IVC diameter is larger than 21 mm and collapses less than 50% with respiration. Pericardium Normal pericardium and no evidence of pericardial effusion. CT 10/10/19 
  
FINDINGS: Patient is post left hip hemiarthroplasty. There is no evidence of 
periprosthetic fracture. No malalignment. 
  
There is edema in the soft tissues laterally. There is an ill-defined 3.8 x 3.7 
x 7.7 cm low density collection lateral to the left greater trochanter. There is 
diffuse edema in the subcutaneous tissues 
  
Prostate is surgically absent. Extensive vascular calcifications. Intrapelvic 
bowel is nondistended. Probable right-sided hydrocele. 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 
bone formation.  
  
IMPRESSION IMPRESSION: 
1. Postoperative collection lateral to the left hip 2. No evidence of periprosthetic fracture 3. Early heterotopic bone formation 
  
CT chest 10/6/19 FINDINGS: 
  
THYROID: Unremarkable. MEDIASTINUM/KIA: No mass or lymphadenopathy. HEART/PERICARDIUM: Mildly enlarged. Extensive coronary atherosclerosis. Prior CABG. LUNGS/PLEURA: Emphysema. Focal areas of atelectasis, bronchiectasis and 
calcification in the left lower lobe and to lesser degree inferior lingula. Small bilateral pleural effusions, portion of which on the left extends along 
the major fissure. Minimal right lower lobe calcification and 
bronchiectasis/atelectasis. No pulmonary edema or pneumothorax. INCIDENTALLY IMAGED UPPER ABDOMEN: Probable left renal cysts, partly visualized. Prior cholecystectomy. BONES: No destructive bone lesion. ADDITIONAL COMMENTS:  N/A. 
  
IMPRESSION IMPRESSION: 
  
 1. Small bilateral pleural effusions with areas of bilateral lower lobe and 
lingular atelectasis and bronchiectasis as above. No pulmonary edema or 
pneumothorax. 2. Cardiomegaly with coronary atherosclerosis and prior CABG. X ray 10/12/19 IMPRESSION IMPRESSION: There is a left hip prosthesis in gross anatomic alignment without 
evidence for orthopedic hardware complication. Procedures: 
10/12/19 Resection of arthroplasty, left hip, with placement of antibiotic spacer. 9/4/19 L hip hemiarthroplasty Assessment / Plan:  
 
Mr Janice Currie is a 80year old gentleman wt hx of prosthetic AV, femur fracture S/P L hip hemiarthroplasty Sep 2019, CAD wt CABG, admitted on 10/6/19 with fever, and weakness. From review of records, there were concerns of pneumonia and he has been on Levaquin. No cough noted per nursing. Patient denied cough as well. Has issues with aspiration and swallowing per discussion with daughter on phone today From chart review: He had it aspirated per notes on 10/6/19. No fluid counts noted but cultures now resulted as MSSE. He received a dose of Vancomycin IV initially on admission 10/6/19  and also Zosyn 10/6-10/10/19. He has since been on Levaquin from 10/6/19. He received Cefazolin perioperatively He had resection of arthroplasty, left hip, with placement of antibiotic spacer WBC on admission 11.8 and he ad a fever of 101.1 on admission. From 10/12/19  operative report, \" There was active purulent drainage. There was a 2-cm defect in the IT band and this drainage continued deep to surround the implant, which was cloudy\". Cultures pending for today's surgery. 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 S/P hip hemiarthroplasty 9/2019 Await final op report and cultures 11/12/19, noted notes indicating purulence and 2cm defect in IT band and drainage deep surrounding implant  On prelim report Blood cx negative Renally dosed Cefazolin IV If febrile , repeat blood cultures Side effects of antibiotics including GI, risk for CDI, renal, hematological discussed Synergy with rifampin not chosen given age, risk for adverse effects and drug drug interactions Yogurt/Probiotic encouraged 2) Prosthetic AV If febrile check blood cx If bacteremia, may need MICHELLE based on goals of care 3)  B/L effusion, risk for aspiration Doubt he has active pneumonia DC Levaquin ( S/P  5 days already 4) YAYA: per primary team  
Renally dose antibiotics 5) CAD, CABG,  
 
6) Cognitive issues per daughter since 9/2019 Per primary team  
 
 
D/W with Ms Divine Lundberg ( daughter ) over phone today Thank for the opportunity to participate in the care of this patient. Please contact with questions or concerns.   
 
 
 
Jaime Dooley,  
5:44 PM

## 2019-10-13 NOTE — PROGRESS NOTES
Problem: Pressure Injury - Risk of 
Goal: *Prevention of pressure injury Description Document Mark Scale and appropriate interventions in the flowsheet. Outcome: Progressing Towards Goal 
Note:  
Pressure Injury Interventions: 
Sensory Interventions: Assess changes in LOC, Avoid rigorous massage over bony prominences, Check visual cues for pain, Float heels, Keep linens dry and wrinkle-free, Minimize linen layers Moisture Interventions: Absorbent underpads, Check for incontinence Q2 hours and as needed, Limit adult briefs, Minimize layers Activity Interventions: Increase time out of bed, PT/OT evaluation Mobility Interventions: Float heels Nutrition Interventions: Document food/fluid/supplement intake Friction and Shear Interventions: Lift sheet, Minimize layers Problem: Patient Education: Go to Patient Education Activity Goal: Patient/Family Education Outcome: Progressing Towards Goal 
  
Problem: Falls - Risk of 
Goal: *Absence of Falls Description Document Ramiro Mayberry Fall Risk and appropriate interventions in the flowsheet. Outcome: Progressing Towards Goal 
Note:  
Fall Risk Interventions: 
Mobility Interventions: Bed/chair exit alarm, Patient to call before getting OOB, PT Consult for mobility concerns Mentation Interventions: Bed/chair exit alarm, Door open when patient unattended, Familiar objects from home, Reorient patient, Room close to nurse's station, Toileting rounds, Update white board Medication Interventions: Bed/chair exit alarm, Patient to call before getting OOB, Teach patient to arise slowly Elimination Interventions: Call light in reach History of Falls Interventions: Bed/chair exit alarm, Door open when patient unattended, Room close to nurse's station Problem: Patient Education: Go to Patient Education Activity Goal: Patient/Family Education Outcome: Progressing Towards Goal 
  
 Problem: Patient Education: Go to Patient Education Activity Goal: Patient/Family Education Outcome: Progressing Towards Goal 
  
Problem: Patient Education: Go to Patient Education Activity Goal: Patient/Family Education Outcome: Progressing Towards Goal 
  
Problem: Patient Education: Go to Patient Education Activity Goal: Patient/Family Education Outcome: Progressing Towards Goal 
  
Problem: Pneumonia: Day 1 Goal: Off Pathway (Use only if patient is Off Pathway) Outcome: Progressing Towards Goal 
Goal: Activity/Safety Outcome: Progressing Towards Goal 
Goal: Consults, if ordered Outcome: Progressing Towards Goal 
Goal: Diagnostic Test/Procedures Outcome: Progressing Towards Goal 
Goal: Nutrition/Diet Outcome: Progressing Towards Goal 
Goal: Medications Outcome: Progressing Towards Goal 
Goal: Respiratory Outcome: Progressing Towards Goal 
Goal: Treatments/Interventions/Procedures Outcome: Progressing Towards Goal 
Goal: Psychosocial 
Outcome: Progressing Towards Goal 
Goal: *Oxygen saturation within defined limits Outcome: Progressing Towards Goal 
Goal: *Influenza vaccine administered (October-March) Outcome: Progressing Towards Goal 
Goal: *Pneumoccocal vaccine administered Outcome: Progressing Towards Goal 
Goal: *Hemodynamically stable Outcome: Progressing Towards Goal 
Goal: *Demonstrates progressive activity Outcome: Progressing Towards Goal 
Goal: *Tolerating diet Outcome: Progressing Towards Goal 
  
Problem: Pneumonia: Day 2 Goal: Off Pathway (Use only if patient is Off Pathway) Outcome: Progressing Towards Goal 
Goal: Activity/Safety Outcome: Progressing Towards Goal 
Goal: Consults, if ordered Outcome: Progressing Towards Goal 
Goal: Diagnostic Test/Procedures Outcome: Progressing Towards Goal 
Goal: Nutrition/Diet Outcome: Progressing Towards Goal 
Goal: Discharge Planning Outcome: Progressing Towards Goal 
Goal: Medications Outcome: Progressing Towards Goal 
 Goal: Respiratory Outcome: Progressing Towards Goal 
Goal: Treatments/Interventions/Procedures Outcome: Progressing Towards Goal 
Goal: Psychosocial 
Outcome: Progressing Towards Goal 
Goal: *Oxygen saturation within defined limits Outcome: Progressing Towards Goal 
Goal: *Hemodynamically stable Outcome: Progressing Towards Goal 
Goal: *Demonstrates progressive activity Outcome: Progressing Towards Goal 
Goal: *Tolerating diet Outcome: Progressing Towards Goal 
Goal: *Optimal pain control at patient's stated goal 
Outcome: Progressing Towards Goal 
  
Problem: Pneumonia: Day 3 Goal: Off Pathway (Use only if patient is Off Pathway) Outcome: Progressing Towards Goal 
Goal: Activity/Safety Outcome: Progressing Towards Goal 
Goal: Consults, if ordered Outcome: Progressing Towards Goal 
Goal: Diagnostic Test/Procedures Outcome: Progressing Towards Goal 
Goal: Nutrition/Diet Outcome: Progressing Towards Goal 
Goal: Discharge Planning Outcome: Progressing Towards Goal 
Goal: Medications Outcome: Progressing Towards Goal 
Goal: Respiratory Outcome: Progressing Towards Goal 
Goal: Treatments/Interventions/Procedures Outcome: Progressing Towards Goal 
Goal: Psychosocial 
Outcome: Progressing Towards Goal 
Goal: *Oxygen saturation within defined limits Outcome: Progressing Towards Goal 
Goal: *Hemodynamically stable Outcome: Progressing Towards Goal 
Goal: *Demonstrates progressive activity Outcome: Progressing Towards Goal 
Goal: *Tolerating diet Outcome: Progressing Towards Goal 
Goal: *Describes available resources and support systems Outcome: Progressing Towards Goal 
Goal: *Optimal pain control at patient's stated goal 
Outcome: Progressing Towards Goal 
  
Problem: Pneumonia: Day 4 Goal: Off Pathway (Use only if patient is Off Pathway) Outcome: Progressing Towards Goal 
Goal: Activity/Safety Outcome: Progressing Towards Goal 
Goal: Nutrition/Diet Outcome: Progressing Towards Goal 
 Goal: Discharge Planning Outcome: Progressing Towards Goal 
Goal: Medications Outcome: Progressing Towards Goal 
Goal: Respiratory Outcome: Progressing Towards Goal 
Goal: Treatments/Interventions/Procedures Outcome: Progressing Towards Goal 
Goal: Psychosocial 
Outcome: Progressing Towards Goal 
  
Problem: Pneumonia: Discharge Outcomes Goal: *Demonstrates progressive activity Outcome: Progressing Towards Goal 
Goal: *Describes follow-up/return visits to physicians Outcome: Progressing Towards Goal 
Goal: *Tolerating diet Outcome: Progressing Towards Goal 
Goal: *Verbalizes name, dosage, time, side effects, and number of days to continue medications Outcome: Progressing Towards Goal 
Goal: *Influenza immunization Outcome: Progressing Towards Goal 
Goal: *Pneumococcal immunization Outcome: Progressing Towards Goal 
Goal: *Respiratory status at baseline Outcome: Progressing Towards Goal 
Goal: *Vital signs within defined limits Outcome: Progressing Towards Goal 
Goal: *Describes available resources and support systems Outcome: Progressing Towards Goal 
Goal: *Optimal pain control at patient's stated goal 
Outcome: Progressing Towards Goal

## 2019-10-13 NOTE — PROGRESS NOTES
Problem: Self Care Deficits Care Plan (Adult) Goal: *Acute Goals and Plan of Care (Insert Text) Description FUNCTIONAL STATUS PRIOR TO ADMISSION: Last week pt was reportedly discharged from SNF to home with daughter assisting (visiting from [place]) and pt was requiring significant physical assistance to stand at night (daughter reportedly with back injury attempting to lift pt at night). HOME SUPPORT: Pt was residing alone in tri-level home (2 daughters reside in [place]). Occupational Therapy Goals Goals reviewed and continued 10/13/2019 following resection of L hip arthroplasty Initiated 10/8/2019 1. Patient will perform upper body dressing with supervision/set-up within 7 day(s). 2.  Patient will perform lower body dressing with minimal assistance/contact guard assist within 7 day(s). 3.  Patient will perform grooming standing sink level with minimal assistance/contact guard assist within 7 day(s). 4.  Patient will perform toilet transfers contact guard assist within 7 day(s). 5.  Patient will perform all aspects of toileting with minimal assistance/contact guard assist within 7 day(s). 6.  Patient will participate in upper extremity therapeutic exercise/activities with supervision/set-up for 10 minutes within 7 day(s). Outcome: Progressing Towards Goal 
 OCCUPATIONAL THERAPY RE-EVALUATION Patient: Cornelio Shearer (5 y.o. male) Date: 10/13/2019 Diagnosis: Pneumonia [J18.9] Pneumonia Procedure(s) (LRB): 
I&D LEFT HIP AND RESECTION ARTHROPLASTY (Left) 1 Day Post-Op Precautions: Fall, Bed Alarm(dementia) Chart, occupational therapy assessment, plan of care, and goals were reviewed. ASSESSMENT Based on the objective data described below, patient continues to present with confusion (A&Ox2), memory loss (dementia), decreased insight into deficits, impaired sitting/standing balance, decreased LLE dexterity and difficulty problem-solving s/p I&D L hip arthroplasty resection and placement of antibiotic spacer POD 1. Noted patient required increased amount of physical assistance (MAX A supine > sit; MOD A bed > chair transfer) in session today however with no c/o pain. Continue to recommend SNF rehab at discharge to maximize patient safety and independence with ADL transfers and tasks. Current Level of Function Impacting Discharge (ADLs): MAX A LB ADLs Other factors to consider for discharge: dementia PLAN : 
Recommendations and Planned Interventions: self care training, functional mobility training, therapeutic exercise, balance training, therapeutic activities, cognitive retraining, endurance activities, patient education, home safety training and family training/education Frequency/Duration: Patient will be followed by occupational therapy 5 times a week to address goals. Recommend with staff: OOB x 3 to chair Recommend next OT session: standing ADLs at sink Recommendation for discharge: (in order for the patient to meet his/her long term goals) Therapy up to 5 days/week in SNF setting This discharge recommendation: 
Has been made in collaboration with the attending provider and/or case management Equipment recommendations for successful discharge (if) home: TBD SUBJECTIVE:  
Patient stated I didn't have a big day yesterday (OT reminded patient he was in [address]).  
 
OBJECTIVE DATA SUMMARY:  
Hospital course since last seen and reason for reevaluation: resection of arthroplasty L hip with placement of antibiotic spacer Cognitive/Behavioral Status: 
Neurologic State: Alert Orientation Level: Oriented to person;Oriented to place; Disoriented to situation;Disoriented to time Cognition: Follows commands Perception: Appears intact Perseveration: No perseveration noted Safety/Judgement: Decreased insight into deficits; Decreased awareness of need for safety;Decreased awareness of need for assistance Functional Mobility and Transfers for ADLs: 
Bed Mobility: 
Supine to Sit: Maximum assistance Transfers: 
Sit to Stand: Minimum assistance;Assist x1;Additional time; Adaptive equipment Bed to Chair: Moderate assistance;Assist x1;Additional time; Adaptive equipment Balance: 
Sitting: Impaired; Without support Sitting - Static: Fair (occasional) Sitting - Dynamic: Fair (occasional) Standing: Impaired; With support Standing - Static: Fair;Constant support Standing - Dynamic : Poor;Constant support ADL Assessment: 
Feeding: Setup Oral Facial Hygiene/Grooming: Setup;Supervision(infer seated) Bathing: Maximum assistance(infer A for BLE and periarea) Upper Body Dressing: Setup;Supervision(infer 2* BUE ROM) Lower Body Dressing: Maximum assistance(infer 2* decreased balance and LLE dexterity) Toileting: Total assistance ADL Intervention and task modifications: 
  
 
  
 
  
 
  
 
  
 
  
 
  
 
Cognitive Retraining Safety/Judgement: Decreased insight into deficits; Decreased awareness of need for safety;Decreased awareness of need for assistance Functional Measure: 
Barthel Index: 
 
Bathin Bladder: 0 Bowels: 5 Groomin Dressin Feedin Mobility: 0 Stairs: 0 Toilet Use: 0 Transfer (Bed to Chair and Back): 5 Total: 25/100 The Barthel ADL Index: Guidelines 1. The index should be used as a record of what a patient does, not as a record of what a patient could do. 2. The main aim is to establish degree of independence from any help, physical or verbal, however minor and for whatever reason. 3. The need for supervision renders the patient not independent. 4. A patient's performance should be established using the best available evidence. Asking the patient, friends/relatives and nurses are the usual sources, but direct observation and common sense are also important. However direct testing is not needed. 5. Usually the patient's performance over the preceding 24-48 hours is important, but occasionally longer periods will be relevant. 6. Middle categories imply that the patient supplies over 50 per cent of the effort. 7. Use of aids to be independent is allowed. Claudette Singh., Barthel, D.W. (4391). Functional evaluation: the Barthel Index. [address] [facility] (14)2. Chema Perez ronnie EMEKA Brown, Faisal Ambrosio., Jaki aFir., [place], [address] [place] [address] (1999). Measuring the change indisability after inpatient rehabilitation; comparison of the responsiveness of the Barthel Index and Functional [place] Measure. Journal of Neurology, Neurosurgery, and Psychiatry, 66(4), 166-255. Juan Monson, N.J.A, KERRY Ornelas, & Goran Hathaway MRAYMOND. (2004.) Assessment of post-stroke quality of life in cost-effectiveness studies: The usefulness of the Barthel Index and the EuroQoL-5D. [facility], 13, 102-73 Activity Tolerance:  
Fair and requires rest breaks Please refer to the flowsheet for vital signs taken during this treatment. After treatment patient left in no apparent distress:  
Sitting in chair, Call bell within reach and Bed / chair alarm activated COMMUNICATION/COLLABORATION:  
The patients plan of care was discussed with: Physical Therapist and Registered Nurse Daniel Campbell Time Calculation: 16 mins

## 2019-10-13 NOTE — PROGRESS NOTES
Physical Therapy 0831 -  
10.13.2019 
  
Orders acknowledged and chart reviewed in prep for PT evaluation. Note patient evaluated by PT on 10/07 and will continue to be seen per PT POC. Also note plan for HHOT with 24/7 supervision/assist vs. SNF pending progress. Will continue to follow.  Thank you.  
  
  
Chapin Macdonald, DPT, PT

## 2019-10-13 NOTE — PROGRESS NOTES
Ortho Daily Progress Note Patient: Yung Parra                   MRN: [id number]  Sex: male YOB: 1927           Age: 80 y.o. 
 
1 Day Post-Op Procedure(s): I&D LEFT HIP AND RESECTION ARTHROPLASTY Subjective: \"I feel pretty rough\" Visit Vitals /70 Pulse 69 Temp 97.5 °F (36.4 °C) Resp 16 Ht 5' 10\" (1.778 m) Wt 61.7 kg (136 lb) SpO2 96% BMI 19.51 kg/m² Lab Results: HGB Date/Time Value Ref Range Status 10/13/2019 02:24 AM 8.9 (L) 12.1 - 17.0 g/dL Final  
 
INR Date/Time Value Ref Range Status 09/03/2019 01:21 PM 1.2 (H) 0.9 - 1.1   Final  
  Comment:  
  A single therapeutic range for Vit K antagonists may not be optimal for all indications - see June, 2008 issue of Chest, American College of Chest Physicians Evidence-Based Clinical Practice Guidelines, 8th Edition. Physical Exam: 
 
DRESSING: Aquacel in place left hip SWELLING: mild NEUROLOGICAL: intact PULSE:yes GENERAL: fatigued, cooperative, no distress MOTION: Minimal pain with gentle ROM left hip DVT Exam: No evidence of DVT seen on physical exam. 
 
 
Plan: DVT prophylaxisAspirin Weight bearing restrictionWBAT Pain Control:stable, mild-to-moderate joint symptoms intermittently, reasonably well controlled by current meds [address] [place] 
10/13/2019  
2:30 PM 
 
 
.

## 2019-10-13 NOTE — PROGRESS NOTES
Attempted to see pt at 12:00, pt in chair eating and requested defer. Upon returning at 13:30, pt had already returned to bed. Will attempt to see pt as able.  
 
Cade Whitehead, RUTHT, PT

## 2019-10-13 NOTE — PROGRESS NOTES
Occupational Therapy [phone] -  
[id number] Orders acknowledged and chart reviewed in prep for OT evaluation. Note patient evaluated by OT on 10/08 and will continue to be seen per OT POC. Also note plan for HHOT with 24/7 supervision/assist vs. SNF pending progress. Will continue to follow. Thank you. Camila Philippe MS, OTR/L

## 2019-10-13 NOTE — PROGRESS NOTES
Hospitalist Progress Note Lynda Ugarte MD 
Answering service: [phone] OR [phone] from in house phone Date of Service:  10/13/2019 NAME:  Rosanna Hanley :  3/18/1927 MRN:  [id number] Admission Summary:  
Patient is a 80year old Male medical history significant for HTN , CAD,and DDD who presents to the Emergency Department accompanied by Daughter via EMS with chief complaint of fever. Patient complains of subjective fever, decreased oral intake  increased weakness after he was discharged from rehab 3 days. 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 He denies chills , Shortness of breath , chest pain , palpitations , leg swelling , syncope or near syncope , headaches , seizures or change in mentation , urinary or bowel complains. 
   
Interval history / Subjective:  
Awake in bed. Complaining of pain in the L hip. Asking about getting food. Assessment & Plan:  
 
Sepsis (POA) secondary to L hip prosthetic joint infection - + staph epi - X-ray showed left femoral neck fracture 9/3 
- s/p Left hip hemiarthroplasty 2019 
- CT of the hip with fluid collection. Concern that this may tract deeper. Ortho following for potential surgery - s/p OR 10/12 for hip arthroplasty, antibiotic spacer placement and I and D.  
- Micro growing Staph epi, Methicillin sensitive. - ID consulted, would resume vanc depending on outcome of surgery CT findings of bilateral atelectasis - febrile on admission, however with bilateral very small infiltrates in setting of new LE edema and new onset CHF. Possible that findings on CT are due to CHF.  
- in the setting of fever ,leucocytosis and CXR concerning new bibasilar infiltrates, originally treated for PNA 
- s/p levofloxacin for potential PNA 
- blood culture NGTD  
- DC IVF  
- Cont to monitor  
  
 Acute CHF exacerbation. No known history of CHF. Pulmonary edema. NYHA class 3-4 on admission. H/o known CAD, ?ischemic. 
- Echo with mild to moderate CHF, EF 40-45% - On metoprolol 
- Daily lasix 
- Not on ACE/ARB due to CKD. - May require cardiology consult, will need to discuss with family given age. - I and O and daily weights.  
  
Bilateral bronchiectasis - possible  2/2 recurrent aspiration pneumonia  
- Not in acute exacerbation (no significant sputum production) - x-ray in 9/3 was unremarkable - No prior CT for comparison 
  
HTN  
- BP at goal  
- Cont home med's  
  
CAD with valvular replacement 
- Stable - Cont home ASA , BB Statins  
  
Increased Generalized weakness  
- likely from the above  
- PT/OT Metabolic encephalopathy - h/o dementia now with intermittent delirium - Add PM Seroquel  
- PRN IV haldol Code status: FULL CODE.  
DVT prophylaxis: Lovenox PTA: home Baseline: Independent with walker Care Plan discussed with: Patient/Family Disposition: TBD Hospital Problems  Date Reviewed: 10/7/2019 Codes Class Noted POA * (Principal) Pneumonia ICD-10-CM: J18.9 ICD-9-CM: 246  10/6/2019 Yes Review of Systems: A comprehensive review of systems was negative except for that written in the HPI. Vital Signs:  
 Last 24hrs VS reviewed since prior progress note. Most recent are: 
Visit Vitals /70 Pulse 69 Temp 97.5 °F (36.4 °C) Resp 16 Ht 5' 10\" (1.778 m) Wt 61.7 kg (136 lb) SpO2 96% BMI 19.51 kg/m² Intake/Output Summary (Last 24 hours) at 10/13/2019 1330 Last data filed at 10/13/2019 0948 Gross per 24 hour Intake 2046.1 ml Output 200 ml Net 1846.1 ml Physical Examination:  
 
 
     
Constitutional:  No acute distress, Sleepy post op ENT:  Oral mucousa dry, oropharynx benign. Resp:  CTA bilaterally. No wheezing/rhonchi/rales. No accessory muscle use CV:  Regular rhythm, normal rate, no murmurs, gallops, rubs GI:  Soft, non distended, non tender. normoactive bowel sounds, no hepatosplenomegaly Musculoskeletal:  No edema, warm, 2+ pulses throughout, L hip with tenderness to palpation, bandage without obvious drainage. Neurologic:  Awake, pleasantly confused. Skin:  Good turgor, no rashes or ulcers Data Review:  
 Review and/or order of clinical lab test 
Review and/or order of tests in the radiology section of CPT Review and/or order of tests in the medicine section of CPT Labs:  
 
Recent Labs 10/13/19 
5113 10/12/19 
0304 10/11/19 
4627 WBC  --  7.1 9.4 HGB 8.9* 8.4* 9.2* HCT  --  27.0* 28.8* PLT  --  230 262 Recent Labs 10/13/19 
7797 10/12/19 
0304 10/11/19 
7775  140 140  
K 4.6 3.7 3.7  108 107 CO2 27 30 27 BUN 26* 24* 23* CREA 1.48* 1.56* 1.51* * 96 114* CA 8.4* 8.8 8.8 MG  --  1.8 1.8 PHOS  --  2.4* 2.7 No results for input(s): SGOT, GPT, ALT, AP, TBIL, TBILI, TP, ALB, GLOB, GGT, AML, LPSE in the last 72 hours. No lab exists for component: AMYP, HLPSE No results for input(s): INR, PTP, APTT, INREXT, INREXT in the last 72 hours. No results for input(s): FE, TIBC, PSAT, FERR in the last 72 hours. No results found for: FOL, RBCF No results for input(s): PH, PCO2, PO2 in the last 72 hours. No results for input(s): CPK, CKNDX, TROIQ in the last 72 hours. No lab exists for component: CPKMB Lab Results Component Value Date/Time Cholesterol, total 127 02/19/2010 11:45 AM  
 HDL Cholesterol 55 02/19/2010 11:45 AM  
 LDL, calculated 63.6 02/19/2010 11:45 AM  
 Triglyceride 42 02/19/2010 11:45 AM  
 CHOL/HDL Ratio 2.3 02/19/2010 11:45 AM  
 
Lab Results Component Value Date/Time Glucose (POC) 109 (H) 10/06/2019 06:40 PM  
 Glucose (POC) 91 09/04/2019 02:27 PM  
 Glucose (POC) 94 09/03/2019 03:20 PM  
 
Lab Results Component Value Date/Time Color YELLOW/STRAW 10/06/2019 05:29 PM  
 Appearance CLEAR 10/06/2019 05:29 PM  
 Specific gravity 1.020 10/06/2019 05:29 PM  
 pH (UA) 6.5 10/06/2019 05:29 PM  
 Protein 30 (A) 10/06/2019 05:29 PM  
 Glucose NEGATIVE  10/06/2019 05:29 PM  
 Ketone NEGATIVE  10/06/2019 05:29 PM  
 Bilirubin NEGATIVE  10/06/2019 05:29 PM  
 Urobilinogen 1.0 10/06/2019 05:29 PM  
 Nitrites NEGATIVE  10/06/2019 05:29 PM  
 Leukocyte Esterase NEGATIVE  10/06/2019 05:29 PM  
 Epithelial cells FEW 10/06/2019 05:29 PM  
 Bacteria NEGATIVE  10/06/2019 05:29 PM  
 WBC 0-4 10/06/2019 05:29 PM  
 RBC 0-5 10/06/2019 05:29 PM  
 
 
 
Medications Reviewed:  
 
Current Facility-Administered Medications Medication Dose Route Frequency  traMADol (ULTRAM) tablet 50 mg  50 mg Oral Q6H PRN  
 acetaminophen (TYLENOL) tablet 1,000 mg  1,000 mg Oral Q8H  
 0.9% sodium chloride infusion  125 mL/hr IntraVENous CONTINUOUS  
 sodium chloride (NS) flush 5-40 mL  5-40 mL IntraVENous Q8H  
 sodium chloride (NS) flush 5-40 mL  5-40 mL IntraVENous PRN  
 HYDROmorphone (PF) (DILAUDID) injection 0.5 mg  0.5 mg IntraVENous Q4H PRN  
 hydrOXYzine HCl (ATARAX) tablet 10 mg  10 mg Oral Q8H PRN  
 naloxone (NARCAN) injection 0.4 mg  0.4 mg IntraVENous PRN  
 ondansetron (ZOFRAN) injection 4 mg  4 mg IntraVENous Q4H PRN  
 senna-docusate (PERICOLACE) 8.6-50 mg per tablet 1 Tab  1 Tab Oral BID  polyethylene glycol (MIRALAX) packet 17 g  17 g Oral DAILY  [START ON 10/14/2019] bisacodyl (DULCOLAX) suppository 10 mg  10 mg Rectal DAILY PRN  
 aspirin delayed-release tablet 81 mg  81 mg Oral BID  
 0.9% sodium chloride infusion 250 mL  250 mL IntraVENous PRN  
 ceFAZolin (ANCEF) 1 g in 0.9% sodium chloride (MBP/ADV) 50 mL  1 g IntraVENous Q12H  
 QUEtiapine (SEROquel) tablet 25 mg  25 mg Oral QHS  balsam peru-castor oil (VENELEX) ointment   Topical BID  [Held by provider] furosemide (LASIX) injection 20 mg  20 mg IntraVENous DAILY  hydrALAZINE (APRESOLINE) 20 mg/mL injection 10 mg  10 mg IntraVENous Q6H PRN  
 calcium-vitamin D (OS-OLEKSANDR) 500 mg-200 unit tablet  1 Tab Oral TID WITH MEALS  docusate sodium (COLACE) capsule 100 mg  100 mg Oral BID PRN  
 metoprolol tartrate (LOPRESSOR) tablet 50 mg  50 mg Oral BID  
 
______________________________________________________________________ EXPECTED LENGTH OF STAY: 4d 2h 
ACTUAL LENGTH OF STAY:          7 
 
            
Compa Lin MD

## 2019-10-13 NOTE — PROGRESS NOTES
CM reviewed chart. Family plans for pt to either return home with [facility], or return to [facility] for SNF since he has been there in the past.  Referral was sent to [facility] to review, it is still pending. Blank Referral was also sent to [facility], they are willing to accept pt back for home health services if he returns home, but they will need specific home health orders sent to them prior to discharge. Pt will likely need to go to [facility] for SNF, looks like he may end up needing IV Abx (still waiting for cultures to finalize), as well as PT/OT and nursing for wound care. CM will continue to follow.  
Uriel Briseno, GIRISHW, ACM

## 2019-10-14 NOTE — PROGRESS NOTES
Physical Therapy Patient currently off the floor for test.  Will follow up later as able and appropriate.  
Alexi Huntley, PT

## 2019-10-14 NOTE — PROGRESS NOTES
Spiritual Care Partner Volunteer visited patient in room 615 on 10.14.19. Documented by: : Rev. Kaylie Puri. Nayeli Arzola; [facility], to contact [phone] [address] call: [phone]

## 2019-10-14 NOTE — PROGRESS NOTES
Problem: Pressure Injury - Risk of 
Goal: *Prevention of pressure injury Description Document Mark Scale and appropriate interventions in the flowsheet. Outcome: Progressing Towards Goal 
Note:  
Pressure Injury Interventions: 
Sensory Interventions: Assess changes in LOC Moisture Interventions: Absorbent underpads Activity Interventions: Increase time out of bed Mobility Interventions: Float heels Nutrition Interventions: Document food/fluid/supplement intake Friction and Shear Interventions: Lift sheet, Minimize layers Problem: Falls - Risk of 
Goal: *Absence of Falls Description Document Jenny Mccarthy Fall Risk and appropriate interventions in the flowsheet. Outcome: Progressing Towards Goal 
Note:  
Fall Risk Interventions: 
Mobility Interventions: Bed/chair exit alarm Mentation Interventions: Bed/chair exit alarm Medication Interventions: Bed/chair exit alarm Elimination Interventions: Call light in reach History of Falls Interventions: Bed/chair exit alarm

## 2019-10-14 NOTE — PROGRESS NOTES
Patient has ambulated to the restroom twice with assistance x1 and his walker since lunch; pt just returned to bed as PT came to do afternoon session and was feeling weak and tired so did not work with PT for the afternoon session.

## 2019-10-14 NOTE — PROGRESS NOTES
NUTRITION COMPLETE ASSESSMENT 
 
RECOMMENDATIONS:  
1. RD will add ONS Ensure Enlive BID for now. Family may bring in Special K+ High Protein shakes (pt prefers) 2. Replete Phos Interventions/Plan:  
Food/Nutrient Delivery:    Commercial supplement Nutrition Education:    
Coordination of Care:   
 
Assessment:  
Reason for Assessment: MD Consult Diet: Regular Supplements: Special K+ High Protein from home once/day Meal Intake:  
Patient Vitals for the past 100 hrs: 
 % Diet Eaten 10/14/19 1217 75 % 10/10/19 1748 25 % 10/10/19 1455 100 % Subjective: 
Pt is confused, no family present. Per discussion with pt and daughter on 10/9 - UBW of 133-136# for several years. .. used to weigh 180# in his younger age, but denies any recent weight loss. He reports a good appetite. Eats 3 meals/day and consumes a Special K high protein drink every morning with his medications. Daughter states she will bring these in. ..declined other supplements. .. Objective: 
80 y.o. male admitted with Pneumonia [J18.9] Pt had L hip hemiarthroplasty on 9/4/19 d/t L femoral neck fracture. Pt was discharged to rehab after that admission and then went home. He presented back to [facility] on 10/6/19 with sepsis 2° L hip prosthetic joint infection. He is now s/p resection of L hip arthroplasty with placement of antibiotic spacer on 10/12/19. Pt was seen by this service on 10/9 d/t results obtained during admission screen as pt reported that he was unsure of weight loss. However, based on conversation with him and his daughter at that time, pt was doing well and his weight was stable. He prefers to drink his Special K Protein Shakes in the morning with his medication, but unsure if daughter is bringing them in.  PO intake appears variable since admission. SLP saw pt on 10/8 and cleared for a regular diet/thin liquids.    
 
Pt  has a past medical history of CAD (coronary artery disease), DDD (degenerative disc disease), lumbar, and Sciatica. Patient Active Problem List  
Diagnosis Code  Fracture of unspecified part of neck of left femur, initial encounter for closed fracture ([facility] [address] S72.002A  Hypertension I10  
 Fall from ground level J73.35BR  Pneumonia J18.9 Nutritionally Significant Medications:  
Dulcolax, OsCal, Ancef, Colace, Lasix (being held), Miralax, Pericolace, Ultram 
 
 
Intake/Output: 
 
Intake/Output Summary (Last 24 hours) at 10/14/2019 1450 Last data filed at 10/14/2019 7414 Gross per 24 hour Intake 1314 ml Output 50 ml Net 1264 ml Last BM: 10/12 - formed Physical Findings: 
 
Nutrition focused physical exam: thin, with severe temporal muscle wasting Edema: LLE non-pitting Abdomen: WDL Skin Integrity: L hip incision Anthropometrics: 
Weight Loss Metrics 10/14/2019 9/9/2019 7/2/2015 Today's Wt 136 lb 136 lb 3.2 oz 140 lb 3.2 oz BMI 19.51 kg/m2 19.54 kg/m2 22.64 kg/m2 Height: 5' 10\" (177.8 cm), Body mass index is 19.51 kg/m². IBW : 75.3 kg (166 lb), % IBW (Calculated): 81.93 % Usual Body Weight: 61.7 kg (136 lb),   
 
Labs:   
 
Recent Labs 10/14/19 
2775 10/13/19 
0224 10/12/19 
0304 * 123* 96 BUN 23* 26* 24* CREA 1.39* 1.48* 1.56*  142 140  
K 4.2 4.6 3.7 * 108 108 CO2 29 27 30  
CA 8.9 8.4* 8.8 PHOS 2.0*  --  2.4* MG 2.0  --  1.8 No results for input(s): SGOT, GPT, ALT, AP, TBIL, TBILI, TP, ALB, GLOB, GGT, AML, LPSE in the last 72 hours. No lab exists for component: AMYP, HLPSE No results for input(s): LAC in the last 72 hours. Recent Labs 10/14/19 
9002 10/13/19 
0224 10/12/19 
0304 WBC 10.5  --  7.1 HGB 8.5* 8.9* 8.4* HCT 26.7*  --  27.0*  
  --  230 No results for input(s): PREALB in the last 72 hours. No results for input(s): TRIGL in the last 72 hours. No results for input(s): GLUCPOC in the last 72 hours. No results found for: HBA1C, HGBE8, RWX8TMEZ, DNN4MMLS, YFB2MONE Cultural, Hinduism and ethnic food preferences identified:   
Food Allergies: NKFA Diet Restrictions:    
 
 
 
 
Pre-Hospitalization: 
Usual Appetite: Good Diet at Home: regular Vitamins/Supplements: Yes(special K+ high protein shake once daily) Current Hospitalization:  
Fluid Restriction:   
Appetite:   
PO Ability: With assist Average po intake: Average supplements intake:    
 
 
Estimated Nutrition Needs:  
Kcals/day: 1650 Kcals/day(REE x 1.3 (surgery)) Protein: 75 g(1.2 gm/kg) Fluid: 1650 ml(1 mL/kcal) Based On: [place] 1898 Weight Used: Actual wt(61.7 kg) Pt expected to meet estimated nutrient needs:  Unable to predict at this time Nutrition Diagnosis:  
1. Increased nutrient needs related to surgery as evidenced by L hip arthroplasty 2. Underweight related to sarcopenia  as evidenced by 82% IBW; 75% of UBW when younger 3.   related to   as evidenced by   
 
Goals:   
 pt will consume >/=50% of meals; 1-2 supplements daily in next 5-7 days Monitoring & Evaluation: Total energy intake, Oral fluids amount, Protein intake Weight/weight change, Electrolyte and renal profile Previous Nutrition Goals Met:   N/A Previous Recommendations:    N/A Education & Discharge Needs: 
 [] None Identified 
 [x] Identified and addressed [x] Participated in care plan, discharge planning, and/or interdisciplinary rounds Bernabe Kaminski RD

## 2019-10-14 NOTE — CONSULTS
Palliative Medicine Consult Ilia: [id number] ([id number]) Patient Name: Chio Razo YOB: 1927 Date of Initial Consult: 10/8/19 Reason for Consult: care decisions Requesting Provider: Dr. Lopez [facility] Primary Care Physician: Diego Romo MD 
 
 SUMMARY:  
Chio Razo is a 80 y.o. with a past history of CAD s/p CABG, lumbar DDD/sciatica, 19 L hip hemiarthroplasty for displaced femoral neck fx went to [facility] SNF (9/10/19-10/2/19) , who was admitted on 10/6/2019 from home with a diagnosis of fever, cough, weakness. Current medical issues leading to Palliative Medicine involvement include: Patient was home from SNF after hip fx repair for just 3 days when he developed fever/ SOB, on abx for pneumonia. Also has drainage from hip wound that started 2 weeks ago, alone with more pain in L hip with standing. Patient noted to have some confusion at times during hospitalization. Patient went to OR 10/12 for hip arthroplasty and abx spacer placement (culture staph epi, meth sensitive) Patient's wife  22 years ago. He has two adult daughters, both live in [place]. Daughter Leela Wilson is staying in [place] with patient now. Family hopes to get patient well enough to travel to [place] and stay near daughters there for the winter. Patient very independent prior to hip fx in September. Loved to be outside cutting wood for the wood stove, cutting grass, doing yard work. He is retired from work for [employer], engineering, and real estate sales. Just this year he turned over checking account/bill paying to his daughters. Prior to hip fracture he was independent in [facility] [place], but doing less around the house in [facility]. PALLIATIVE DIAGNOSES:  
 
1. L hip wound drainage, s/p OR 10/12 for abx spacer/hip arthroplasty 2. L hip pain 3. Anemia 4. Debility, deconditioning 5. Mild cognitive changes, hospital delirium  PLAN:  
 Daughters concerned that he is coughing a bit more today. Have asked to have him use incentive spirometry. Education has already been provided to family about code status - they are thinking about it, no decisions made Will follow peripherally, please call as needed [phone] GOALS OF CARE / TREATMENT PREFERENCES:  
 
GOALS OF CARE: 
Patient/Health Care Proxy Stated Goals: Prolong life TREATMENT PREFERENCES:  
Code Status: Full Code Advance Care Planning: 
[] [facility] Interdisciplinary Team has updated the ACP Navigator with Johana and Patient Capacity Advance Care Planning 10/7/2019 Confirm Advance Directive None Medical Interventions: Full interventions Other Instructions: Other: As far as possible, the palliative care team has discussed with patient / health care proxy about goals of care / treatment preferences for patient. HISTORY:  
 
History obtained from: chart CHIEF COMPLAINT: fever, cough HPI/SUBJECTIVE: The patient is:  
[x] Verbal and participatory [] Non-participatory due to:  
 
80year old male quite independent prior to hip fracture in September (see above) Home from SNF with his daughter visiting to care for him -- he became weaker, developed cough and fever Had made pretty good progress at SNF, walking with rollator. He notes pain in left hip that was new last couple of weeks and also drainage at the dressing site. 10/14  Walked a few steps today. Stomach doesn't feel so good right now, but he was able to enjoy his favorite snack today (Bugles) Clinical Pain Assessment (nonverbal scale for severity on nonverbal patients):  
Clinical Pain Assessment Severity: 0 Duration: for how long has pt been experiencing pain (e.g., 2 days, 1 month, years) Frequency: how often pain is an issue (e.g., several times per day, once every few days, constant)  FUNCTIONAL ASSESSMENT:  
 
 Palliative Performance Scale (PPS): PPS: 30 
 
 
 PSYCHOSOCIAL/SPIRITUAL SCREENING:  
 
Palliative IDT has assessed this patient for cultural preferences / practices and a referral made as appropriate to needs (Cultural Services, Patient Advocacy, Ethics, etc.) Any spiritual / Jewish concerns: 
[] Yes /  [x] No 
 
Caregiver Burnout: 
[] Yes /  [x] No /  [] No Caregiver Present Anticipatory grief assessment:  
[x] Normal  / [] Maladaptive ESAS Anxiety: Anxiety: 0 
 
ESAS Depression: Depression: 0 REVIEW OF SYSTEMS:  
 
Positive and pertinent negative findings in ROS are noted above in HPI. The following systems were [x] reviewed / [] unable to be reviewed as noted in HPI Other findings are noted below. Systems: constitutional, ears/nose/mouth/throat, respiratory, gastrointestinal, genitourinary, musculoskeletal, integumentary, neurologic, psychiatric, endocrine. Positive findings noted below. Modified ESAS Completed by: provider Fatigue: 2 Drowsiness: 0 Depression: 0 Pain: 0 Anxiety: 0 Nausea: 0 Anorexia: 0 Dyspnea: 0 Constipation: No  
  Stool Occurrence(s): 1 PHYSICAL EXAM:  
 
From RN flowsheet: 
Wt Readings from Last 3 Encounters:  
10/14/19 136 lb (61.7 kg) 09/09/19 136 lb 3.2 oz (61.8 kg) 07/02/15 140 lb 3.2 oz (63.6 kg) Blood pressure 166/65, pulse (!) 55, temperature 97.9 °F (36.6 °C), resp. rate 17, height 5' 10\" (1.778 m), weight 136 lb (61.7 kg), SpO2 96 %. Pain Scale 1: Numeric (0 - 10) Pain Intensity 1: 0 Pain Onset 1: surgical/postop Pain Location 1: Hip Pain Orientation 1: Left Pain Description 1: Aching Pain Intervention(s) 1: Medication (see MAR) Last bowel movement, if known:  
 
Constitutional:thin elderly male,  sitting up in bed eating dinner Eyes: pupils equal, anicteric No respiratory distress Engaging in conversation Speech slow, takes extra time to come out with answers Insight a bit diminished,  HISTORY:  
 
 Principal Problem: 
  Pneumonia (10/6/2019) Past Medical History:  
Diagnosis Date  CAD (coronary artery disease)  DDD (degenerative disc disease), lumbar  Sciatica Past Surgical History:  
Procedure Laterality Date  HX CORONARY ARTERY BYPASS GRAFT History reviewed. No pertinent family history. History reviewed, no pertinent family history. Social History Tobacco Use  Smoking status: Never Smoker  Smokeless tobacco: Never Used Substance Use Topics  Alcohol use: Yes No Known Allergies Current Facility-Administered Medications Medication Dose Route Frequency  traMADol (ULTRAM) tablet 50 mg  50 mg Oral Q6H PRN  
 acetaminophen (TYLENOL) tablet 1,000 mg  1,000 mg Oral Q8H  
 haloperidol lactate (HALDOL) injection 2 mg  2 mg IntraVENous Q6H PRN  
 sodium chloride (NS) flush 5-40 mL  5-40 mL IntraVENous Q8H  
 sodium chloride (NS) flush 5-40 mL  5-40 mL IntraVENous PRN  
 hydrOXYzine HCl (ATARAX) tablet 10 mg  10 mg Oral Q8H PRN  
 naloxone (NARCAN) injection 0.4 mg  0.4 mg IntraVENous PRN  
 senna-docusate (PERICOLACE) 8.6-50 mg per tablet 1 Tab  1 Tab Oral BID  polyethylene glycol (MIRALAX) packet 17 g  17 g Oral DAILY  bisacodyl (DULCOLAX) suppository 10 mg  10 mg Rectal DAILY PRN  
 aspirin delayed-release tablet 81 mg  81 mg Oral BID  
 0.9% sodium chloride infusion 250 mL  250 mL IntraVENous PRN  
 ceFAZolin (ANCEF) 1 g in 0.9% sodium chloride (MBP/ADV) 50 mL  1 g IntraVENous Q12H  
 QUEtiapine (SEROquel) tablet 25 mg  25 mg Oral QHS  balsam peru-castor oil (VENELEX) ointment   Topical BID  [Held by provider] furosemide (LASIX) injection 20 mg  20 mg IntraVENous DAILY  hydrALAZINE (APRESOLINE) 20 mg/mL injection 10 mg  10 mg IntraVENous Q6H PRN  
 calcium-vitamin D (OS-OLEKSANDR) 500 mg-200 unit tablet  1 Tab Oral TID WITH MEALS  docusate sodium (COLACE) capsule 100 mg  100 mg Oral BID PRN  
  metoprolol tartrate (LOPRESSOR) tablet 50 mg  50 mg Oral BID  
 
 
 
 LAB AND IMAGING FINDINGS:  
 
Lab Results Component Value Date/Time WBC 10.5 10/14/2019 03:39 AM  
 HGB 8.5 (L) 10/14/2019 03:39 AM  
 PLATELET 436 55/21/5467 03:39 AM  
 
Lab Results Component Value Date/Time Sodium 141 10/14/2019 03:39 AM  
 Potassium 4.2 10/14/2019 03:39 AM  
 Chloride 109 (H) 10/14/2019 03:39 AM  
 CO2 29 10/14/2019 03:39 AM  
 BUN 23 (H) 10/14/2019 03:39 AM  
 Creatinine 1.39 (H) 10/14/2019 03:39 AM  
 Calcium 8.9 10/14/2019 03:39 AM  
 Magnesium 2.0 10/14/2019 03:39 AM  
 Phosphorus 2.0 (L) 10/14/2019 03:39 AM  
  
Lab Results Component Value Date/Time AST (SGOT) 26 10/06/2019 05:29 PM  
 Alk. phosphatase 156 (H) 10/06/2019 05:29 PM  
 Protein, total 6.6 10/06/2019 05:29 PM  
 Albumin 2.7 (L) 10/06/2019 05:29 PM  
 Globulin 3.9 10/06/2019 05:29 PM  
 
Lab Results Component Value Date/Time INR 1.2 (H) 09/03/2019 01:21 PM  
 Prothrombin time 12.2 (H) 09/03/2019 01:21 PM  
 aPTT 29.9 05/24/2009 07:50 AM  
  
Lab Results Component Value Date/Time Ferritin 206 09/27/2010 08:59 AM  
  
No results found for: PH, PCO2, PO2 No components found for: [place] No results found for: CPK, CKMB Total time: 
Counseling / coordination time, spent as noted above:  
> 50% counseling / coordination?:  
 
 
Prolonged service was provided for  []30 min   []75 min in face to face time in the presence of the patient, spent as noted above. Time Start:  
Time End:  
Note: this can only be billed with 16412 (initial) or 92966 (follow up). If multiple start / stop times, list each separately.

## 2019-10-14 NOTE — PROGRESS NOTES
Hospitalist Progress Note Georgia Garay MD 
Answering service: [phone] OR [phone] from in house phone Date of Service:  10/14/2019 NAME:  Paola Samson :  3/18/1927 MRN:  [id number] Admission Summary:  
Patient is a 80year old Male medical history significant for HTN , CAD,and DDD who presents to the Emergency Department accompanied by Daughter via EMS with chief complaint of fever. Patient complains of subjective fever, decreased oral intake  increased weakness after he was discharged from rehab 3 days. 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 He denies chills , Shortness of breath , chest pain , palpitations , leg swelling , syncope or near syncope , headaches , seizures or change in mentation , urinary or bowel complains. 
   
Interval history / Subjective: More alert today, still pleasantly confused. Discussed CHF diagnosis, and family says they have a cardiologist already they would prefer to follow up with. Was up with walker today. Denies any hip pain today, and seems much more comfortable. Assessment & Plan:  
 
Sepsis (POA) secondary to L hip prosthetic joint infection - + staph epi - X-ray showed left femoral neck fracture 9/3 
- s/p Left hip hemiarthroplasty 2019 
- CT of the hip with fluid collection. Concern that this may tract deeper. Ortho following for potential surgery - s/p OR 10/12 for hip arthroplasty, antibiotic spacer placement and I and D.  
- Micro growing Staph epi, Methicillin sensitive. - ID consulted, on Cefazolin, with 6 weeks until 19 per Dr. Linda Naqvi.  
 
CT findings of bilateral atelectasis - febrile on admission, however with bilateral very small infiltrates in setting of new LE edema and new onset CHF. Possible that findings on CT are due to CHF. - in the setting of fever ,leucocytosis and CXR concerning new bibasilar infiltrates, originally treated for PNA 
- s/p levofloxacin for potential PNA 
- blood culture NGTD  
- DC IVF  
- Cont to monitor  
  
Acute CHF exacerbation. No known history of CHF. Pulmonary edema. NYHA class 3-4 on admission. H/o known CAD, ?ischemic. 
- Echo with mild to moderate CHF, EF 40-45% - On metoprolol 
- Holding lasix  
- Not on ACE/ARB due to CKD. - Family wishes to follow up with his cardiologist outpatient. - I and O and daily weights.  
  
Bilateral bronchiectasis - possible  2/2 recurrent aspiration pneumonia  
- Not in acute exacerbation (no significant sputum production) - x-ray in 9/3 was unremarkable - No prior CT for comparison 
  
HTN  
- BP at goal  
- Cont home med's  
  
CAD with valvular replacement 
- Stable - Cont home ASA , BB Statins  
  
Increased Generalized weakness  
- likely from the above  
- PT/OT Metabolic encephalopathy - h/o dementia now with intermittent delirium - Add PM Seroquel  
- PRN IV haldol Code status: FULL CODE.  
DVT prophylaxis: Lovenox PTA: home Baseline: Independent with walker Care Plan discussed with: Patient/Family Disposition: TBD Hospital Problems  Date Reviewed: 10/7/2019 Codes Class Noted POA * (Principal) Pneumonia ICD-10-CM: J18.9 ICD-9-CM: 180  10/6/2019 Yes Review of Systems: A comprehensive review of systems was negative except for that written in the HPI. Vital Signs:  
 Last 24hrs VS reviewed since prior progress note. Most recent are: 
Visit Vitals /65 (BP 1 Location: Left arm, BP Patient Position: At rest) Pulse (!) 55 Temp 97.9 °F (36.6 °C) Resp 17 Ht 5' 10\" (1.778 m) Wt 61.7 kg (136 lb) SpO2 96% BMI 19.51 kg/m² Intake/Output Summary (Last 24 hours) at 10/14/2019 1521 Last data filed at 10/14/2019 2314 Gross per 24 hour Intake 1314 ml Output 50 ml Net 1264 ml Physical Examination:  
 
 
     
Constitutional:  No acute distress, Sleepy post op ENT:  Oral mucousa dry, oropharynx benign. Resp:  CTA bilaterally. No wheezing/rhonchi/rales. No accessory muscle use CV:  Regular rhythm, normal rate, no murmurs, gallops, rubs GI:  Soft, non distended, non tender. normoactive bowel sounds, no hepatosplenomegaly Musculoskeletal:  No edema, warm, 2+ pulses throughout, L hip with tenderness to palpation, bandage without obvious drainage. Neurologic:  Awake, pleasantly confused. Skin:  Good turgor, no rashes or ulcers Data Review:  
 Review and/or order of clinical lab test 
Review and/or order of tests in the radiology section of CPT Review and/or order of tests in the medicine section of CPT Labs:  
 
Recent Labs 10/14/19 
9688 10/13/19 
0224 10/12/19 
0304 WBC 10.5  --  7.1 HGB 8.5* 8.9* 8.4* HCT 26.7*  --  27.0*  
  --  230 Recent Labs 10/14/19 
8871 10/13/19 
0224 10/12/19 
0304  142 140  
K 4.2 4.6 3.7 * 108 108 CO2 29 27 30 BUN 23* 26* 24* CREA 1.39* 1.48* 1.56* * 123* 96  
CA 8.9 8.4* 8.8 MG 2.0  --  1.8 PHOS 2.0*  --  2.4* No results for input(s): SGOT, GPT, ALT, AP, TBIL, TBILI, TP, ALB, GLOB, GGT, AML, LPSE in the last 72 hours. No lab exists for component: AMYP, HLPSE No results for input(s): INR, PTP, APTT, INREXT, INREXT in the last 72 hours. No results for input(s): FE, TIBC, PSAT, FERR in the last 72 hours. No results found for: FOL, RBCF No results for input(s): PH, PCO2, PO2 in the last 72 hours. No results for input(s): CPK, CKNDX, TROIQ in the last 72 hours. No lab exists for component: CPKMB Lab Results Component Value Date/Time  Cholesterol, total 127 02/19/2010 11:45 AM  
 HDL Cholesterol 55 02/19/2010 11:45 AM  
 LDL, calculated 63.6 02/19/2010 11:45 AM  
 Triglyceride 42 02/19/2010 11:45 AM  
 CHOL/HDL Ratio 2.3 02/19/2010 11:45 AM  
 
 Lab Results Component Value Date/Time Glucose (POC) 109 (H) 10/06/2019 06:40 PM  
 Glucose (POC) 91 09/04/2019 02:27 PM  
 Glucose (POC) 94 09/03/2019 03:20 PM  
 
Lab Results Component Value Date/Time Color YELLOW/STRAW 10/14/2019 07:49 AM  
 Appearance CLEAR 10/14/2019 07:49 AM  
 Specific gravity 1.012 10/14/2019 07:49 AM  
 pH (UA) 7.0 10/14/2019 07:49 AM  
 Protein TRACE (A) 10/14/2019 07:49 AM  
 Glucose NEGATIVE  10/14/2019 07:49 AM  
 Ketone NEGATIVE  10/14/2019 07:49 AM  
 Bilirubin NEGATIVE  10/14/2019 07:49 AM  
 Urobilinogen 0.2 10/14/2019 07:49 AM  
 Nitrites NEGATIVE  10/14/2019 07:49 AM  
 Leukocyte Esterase NEGATIVE  10/14/2019 07:49 AM  
 Epithelial cells FEW 10/14/2019 07:49 AM  
 Bacteria NEGATIVE  10/14/2019 07:49 AM  
 WBC 0-4 10/14/2019 07:49 AM  
 RBC 0-5 10/14/2019 07:49 AM  
 
 
 
Medications Reviewed:  
 
Current Facility-Administered Medications Medication Dose Route Frequency  traMADol (ULTRAM) tablet 50 mg  50 mg Oral Q6H PRN  
 acetaminophen (TYLENOL) tablet 1,000 mg  1,000 mg Oral Q8H  
 haloperidol lactate (HALDOL) injection 2 mg  2 mg IntraVENous Q6H PRN  
 sodium chloride (NS) flush 5-40 mL  5-40 mL IntraVENous Q8H  
 sodium chloride (NS) flush 5-40 mL  5-40 mL IntraVENous PRN  
 hydrOXYzine HCl (ATARAX) tablet 10 mg  10 mg Oral Q8H PRN  
 naloxone (NARCAN) injection 0.4 mg  0.4 mg IntraVENous PRN  
 senna-docusate (PERICOLACE) 8.6-50 mg per tablet 1 Tab  1 Tab Oral BID  polyethylene glycol (MIRALAX) packet 17 g  17 g Oral DAILY  bisacodyl (DULCOLAX) suppository 10 mg  10 mg Rectal DAILY PRN  
 aspirin delayed-release tablet 81 mg  81 mg Oral BID  
 0.9% sodium chloride infusion 250 mL  250 mL IntraVENous PRN  
 ceFAZolin (ANCEF) 1 g in 0.9% sodium chloride (MBP/ADV) 50 mL  1 g IntraVENous Q12H  
 QUEtiapine (SEROquel) tablet 25 mg  25 mg Oral QHS  balsam peru-castor oil (VENELEX) ointment   Topical BID  
  [Held by provider] furosemide (LASIX) injection 20 mg  20 mg IntraVENous DAILY  hydrALAZINE (APRESOLINE) 20 mg/mL injection 10 mg  10 mg IntraVENous Q6H PRN  
 calcium-vitamin D (OS-OLEKSANDR) 500 mg-200 unit tablet  1 Tab Oral TID WITH MEALS  docusate sodium (COLACE) capsule 100 mg  100 mg Oral BID PRN  
 metoprolol tartrate (LOPRESSOR) tablet 50 mg  50 mg Oral BID  
 
______________________________________________________________________ EXPECTED LENGTH OF STAY: 8d 21h ACTUAL LENGTH OF STAY:          8 
 
            
Lynda Ugarte MD

## 2019-10-14 NOTE — PROGRESS NOTES
ORTHO PROGRESS NOTE 2019 Admit Date:  
10/6/2019 Post Op day: 2 Days Post-Op Subjective:   
Pepper Reinoso states he is feeling well. Remains confused per baseline. S/P resection arthroplasty of left hip. On Ancef per ID. No new complaints. PT/OT:  
Gait:  Gait Speed/Milka: Pace decreased (<100 feet/min) Step Length: Right shortened, Left shortened Stance: Left decreased Gait Abnormalities: Antalgic, Decreased step clearance, Shuffling gait, Step to gait Ambulation - Level of Assistance: Moderate assistance Distance (ft): 60 Feet (ft) Assistive Device: Gait belt, Walker, rolling Vital Signs:   
Patient Vitals for the past 8 hrs: 
 BP Temp Pulse Resp SpO2  
10/14/19 0957 173/75 98.4 °F (36.9 °C) 65 18 94 % Temp (24hrs), Av °F (36.7 °C), Min:97.6 °F (36.4 °C), Max:98.4 °F (36.9 °C) Pain Control:  
Pain Assessment Pain Scale 1: Numeric (0 - 10) Pain Intensity 1: 0 Pain Onset 1: surgical/postop Pain Location 1: Hip Pain Orientation 1: Left Pain Description 1: Aching Pain Intervention(s) 1: Medication (see MAR) Meds: 
 
Current Facility-Administered Medications Medication Dose Route Frequency  traMADol (ULTRAM) tablet 50 mg  50 mg Oral Q6H PRN  
 acetaminophen (TYLENOL) tablet 1,000 mg  1,000 mg Oral Q8H  
 haloperidol lactate (HALDOL) injection 2 mg  2 mg IntraVENous Q6H PRN  
 sodium chloride (NS) flush 5-40 mL  5-40 mL IntraVENous Q8H  
 sodium chloride (NS) flush 5-40 mL  5-40 mL IntraVENous PRN  
 hydrOXYzine HCl (ATARAX) tablet 10 mg  10 mg Oral Q8H PRN  
 naloxone (NARCAN) injection 0.4 mg  0.4 mg IntraVENous PRN  
 senna-docusate (PERICOLACE) 8.6-50 mg per tablet 1 Tab  1 Tab Oral BID  polyethylene glycol (MIRALAX) packet 17 g  17 g Oral DAILY  bisacodyl (DULCOLAX) suppository 10 mg  10 mg Rectal DAILY PRN  
 aspirin delayed-release tablet 81 mg  81 mg Oral BID  
  0.9% sodium chloride infusion 250 mL  250 mL IntraVENous PRN  
 ceFAZolin (ANCEF) 1 g in 0.9% sodium chloride (MBP/ADV) 50 mL  1 g IntraVENous Q12H  
 QUEtiapine (SEROquel) tablet 25 mg  25 mg Oral QHS  balsam peru-castor oil (VENELEX) ointment   Topical BID  [Held by provider] furosemide (LASIX) injection 20 mg  20 mg IntraVENous DAILY  hydrALAZINE (APRESOLINE) 20 mg/mL injection 10 mg  10 mg IntraVENous Q6H PRN  
 calcium-vitamin D (OS-OLEKSANDR) 500 mg-200 unit tablet  1 Tab Oral TID WITH MEALS  docusate sodium (COLACE) capsule 100 mg  100 mg Oral BID PRN  
 metoprolol tartrate (LOPRESSOR) tablet 50 mg  50 mg Oral BID  
 
 
LAB:   
Recent Labs 10/14/19 
7336 HCT 26.7* HGB 8.5* Transfuse PRBC's:   
 
Assessment & Physician's Comment: 
Dressing is clean, dry, and intact Neurovascular checks within normal limits Orientation:  Disoriented (baseline) Principal Problem: 
  Pneumonia (10/6/2019) Plan: 
 
Cont PT/OT - WBAT Pain control PRN - Tylenol Ice packs to hip PRN 
ASA for DVT prophylaxis Abx per ID team - will need prolonged course Medical management per primary team 
Case Management for disposition planning Ignacio Etienne PA-C [facility] [address] [facility], [place]

## 2019-10-14 NOTE — PROGRESS NOTES
Patient c/o burning and pain w/urination. Patient voided 50 ml clear yellow urine with moderate difficulty. Patient was bladder scanned with noted 810 ml out of three scans. Belly is semi-soft w/small distension. Primary nurse Latasha notified.

## 2019-10-14 NOTE — PROGRESS NOTES
Patient complaining of burning upon urination. Bladder scan performed and patient noted to have approximately 800cc of urine. Straight cath attempted per order, but met with resistance. Straight cath attempt stopped due to concern for BPH (although no history of BPH per note) and possible trauma with insertion. IMS paged. Awaiting page back to discuss ordering urinalysis and straight catheterization.

## 2019-10-14 NOTE — PROGRESS NOTES
Problem: Pressure Injury - Risk of 
Goal: *Prevention of pressure injury Description Document Mark Scale and appropriate interventions in the flowsheet. Outcome: Progressing Towards Goal 
Note:  
Pressure Injury Interventions: 
Sensory Interventions: Assess changes in LOC, Float heels, Keep linens dry and wrinkle-free, Minimize linen layers Moisture Interventions: Absorbent underpads, Limit adult briefs, Minimize layers Activity Interventions: Increase time out of bed, PT/OT evaluation Mobility Interventions: Float heels, PT/OT evaluation, HOB 30 degrees or less Nutrition Interventions: Document food/fluid/supplement intake Friction and Shear Interventions: Lift sheet, Feet elevated on foot rest 
 
  
 
 
 
  
Problem: Patient Education: Go to Patient Education Activity Goal: Patient/Family Education Outcome: Progressing Towards Goal 
  
Problem: Falls - Risk of 
Goal: *Absence of Falls Description Document Tom Jordin Fall Risk and appropriate interventions in the flowsheet. Outcome: Progressing Towards Goal 
Note:  
Fall Risk Interventions: 
Mobility Interventions: Bed/chair exit alarm, Patient to call before getting OOB Mentation Interventions: Bed/chair exit alarm, Door open when patient unattended, Eyeglasses and hearing aids, More frequent rounding, Reorient patient, Room close to nurse's station, Update white board, Toileting rounds Medication Interventions: Bed/chair exit alarm, Patient to call before getting OOB Elimination Interventions: Call light in reach, Urinal in reach, Toileting schedule/hourly rounds History of Falls Interventions: Bed/chair exit alarm Problem: Patient Education: Go to Patient Education Activity Goal: Patient/Family Education Outcome: Progressing Towards Goal 
  
Problem: Patient Education: Go to Patient Education Activity Goal: Patient/Family Education Outcome: Progressing Towards Goal 
  
 Problem: Patient Education: Go to Patient Education Activity Goal: Patient/Family Education Outcome: Progressing Towards Goal 
  
Problem: Patient Education: Go to Patient Education Activity Goal: Patient/Family Education Outcome: Progressing Towards Goal 
  
Problem: Pneumonia: Day 1 Goal: Off Pathway (Use only if patient is Off Pathway) Outcome: Progressing Towards Goal 
Goal: Activity/Safety Outcome: Progressing Towards Goal 
Goal: Consults, if ordered Outcome: Progressing Towards Goal 
Goal: Diagnostic Test/Procedures Outcome: Progressing Towards Goal 
Goal: Nutrition/Diet Outcome: Progressing Towards Goal 
Goal: Medications Outcome: Progressing Towards Goal 
Goal: Respiratory Outcome: Progressing Towards Goal 
Goal: Treatments/Interventions/Procedures Outcome: Progressing Towards Goal 
Goal: Psychosocial 
Outcome: Progressing Towards Goal 
Goal: *Oxygen saturation within defined limits Outcome: Progressing Towards Goal 
Goal: *Influenza vaccine administered (October-March) Outcome: Progressing Towards Goal 
Goal: *Pneumoccocal vaccine administered Outcome: Progressing Towards Goal 
Goal: *Hemodynamically stable Outcome: Progressing Towards Goal 
Goal: *Demonstrates progressive activity Outcome: Progressing Towards Goal 
Goal: *Tolerating diet Outcome: Progressing Towards Goal 
  
Problem: Pneumonia: Day 2 Goal: Off Pathway (Use only if patient is Off Pathway) Outcome: Progressing Towards Goal 
Goal: Activity/Safety Outcome: Progressing Towards Goal 
Goal: Consults, if ordered Outcome: Progressing Towards Goal 
Goal: Diagnostic Test/Procedures Outcome: Progressing Towards Goal 
Goal: Nutrition/Diet Outcome: Progressing Towards Goal 
Goal: Discharge Planning Outcome: Progressing Towards Goal 
Goal: Medications Outcome: Progressing Towards Goal 
Goal: Respiratory Outcome: Progressing Towards Goal 
Goal: Treatments/Interventions/Procedures Outcome: Progressing Towards Goal 
 Goal: Psychosocial 
Outcome: Progressing Towards Goal 
Goal: *Oxygen saturation within defined limits Outcome: Progressing Towards Goal 
Goal: *Hemodynamically stable Outcome: Progressing Towards Goal 
Goal: *Demonstrates progressive activity Outcome: Progressing Towards Goal 
Goal: *Tolerating diet Outcome: Progressing Towards Goal 
Goal: *Optimal pain control at patient's stated goal 
Outcome: Progressing Towards Goal 
  
Problem: Pneumonia: Day 3 Goal: Off Pathway (Use only if patient is Off Pathway) Outcome: Progressing Towards Goal 
Goal: Activity/Safety Outcome: Progressing Towards Goal 
Goal: Consults, if ordered Outcome: Progressing Towards Goal 
Goal: Diagnostic Test/Procedures Outcome: Progressing Towards Goal 
Goal: Nutrition/Diet Outcome: Progressing Towards Goal 
Goal: Discharge Planning Outcome: Progressing Towards Goal 
Goal: Medications Outcome: Progressing Towards Goal 
Goal: Respiratory Outcome: Progressing Towards Goal 
Goal: Treatments/Interventions/Procedures Outcome: Progressing Towards Goal 
Goal: Psychosocial 
Outcome: Progressing Towards Goal 
Goal: *Oxygen saturation within defined limits Outcome: Progressing Towards Goal 
Goal: *Hemodynamically stable Outcome: Progressing Towards Goal 
Goal: *Demonstrates progressive activity Outcome: Progressing Towards Goal 
Goal: *Tolerating diet Outcome: Progressing Towards Goal 
Goal: *Describes available resources and support systems Outcome: Progressing Towards Goal 
Goal: *Optimal pain control at patient's stated goal 
Outcome: Progressing Towards Goal 
  
Problem: Pneumonia: Day 4 Goal: Off Pathway (Use only if patient is Off Pathway) Outcome: Progressing Towards Goal 
Goal: Activity/Safety Outcome: Progressing Towards Goal 
Goal: Nutrition/Diet Outcome: Progressing Towards Goal 
Goal: Discharge Planning Outcome: Progressing Towards Goal 
Goal: Medications Outcome: Progressing Towards Goal 
Goal: Respiratory Outcome: Progressing Towards Goal 
Goal: Treatments/Interventions/Procedures Outcome: Progressing Towards Goal 
Goal: Psychosocial 
Outcome: Progressing Towards Goal 
  
Problem: Pneumonia: Discharge Outcomes Goal: *Demonstrates progressive activity Outcome: Progressing Towards Goal 
Goal: *Describes follow-up/return visits to physicians Outcome: Progressing Towards Goal 
Goal: *Tolerating diet Outcome: Progressing Towards Goal 
Goal: *Verbalizes name, dosage, time, side effects, and number of days to continue medications Outcome: Progressing Towards Goal 
Goal: *Influenza immunization Outcome: Progressing Towards Goal 
Goal: *Pneumococcal immunization Outcome: Progressing Towards Goal 
Goal: *Respiratory status at baseline Outcome: Progressing Towards Goal 
Goal: *Vital signs within defined limits Outcome: Progressing Towards Goal 
Goal: *Describes available resources and support systems Outcome: Progressing Towards Goal 
Goal: *Optimal pain control at patient's stated goal 
Outcome: Progressing Towards Goal

## 2019-10-14 NOTE — PROGRESS NOTES
Infectious Disease Progress Note HPI: 
 
Mr Sharon Maloney much more awake today Denied pain, fever, chills, cough, nausea, vomiting, diarrhea Chuy Berger to go home Not much appetite and not eating much this am  
Denied abdominal pain Medications: No current facility-administered medications on file prior to encounter. Current Outpatient Medications on File Prior to Encounter Medication Sig Dispense Refill  docusate sodium (COLACE) 100 mg capsule Take 1 Cap by mouth two (2) times daily as needed for Constipation for up to 90 days. 60 Cap 0  
 calcium-vitamin D (OYSTER SHELL) 500 mg(1,250mg) -200 unit per tablet Take 1 Tab by mouth three (3) times daily (with meals). 90 Tab 0  
 metoprolol tartrate (LOPRESSOR) 50 mg tablet Take 1 Tab by mouth two (2) times a day. 60 Tab 0  
 acetaminophen (TYLENOL) 500 mg tablet Take 1 Tab by mouth every six (6) hours. 60 Tab 0  
 aspirin delayed-release 325 mg tablet Take 325 mg by mouth daily. Current Facility-Administered Medications:  
  traMADol (ULTRAM) tablet 50 mg, 50 mg, Oral, Q6H PRN, Denise Lea NP, 50 mg at 10/13/19 9111   acetaminophen (TYLENOL) tablet 1,000 mg, 1,000 mg, Oral, Q8H, Rolm Sicard, Riccardo Caul I, MD, 1,000 mg at 10/14/19 8311 
  haloperidol lactate (HALDOL) injection 2 mg, 2 mg, IntraVENous, Q6H PRN, Rolm Sicard, Morey Cassette, MD 
  sodium chloride (NS) flush 5-40 mL, 5-40 mL, IntraVENous, Q8H, Billy Guevara MD, 10 mL at 10/14/19 0317 
  sodium chloride (NS) flush 5-40 mL, 5-40 mL, IntraVENous, PRN, Ja Guevara MD 
  hydrOXYzine HCl (ATARAX) tablet 10 mg, 10 mg, Oral, Q8H PRN, Ja Guevara MD 
  naloxone (NARCAN) injection 0.4 mg, 0.4 mg, IntraVENous, PRN, Ja Guevara MD 
  senna-docusate (PERICOLACE) 8.6-50 mg per tablet 1 Tab, 1 Tab, Oral, BID, Ratna Shells, MD, 1 Tab at 10/14/19 1000   polyethylene glycol (MIRALAX) packet 17 g, 17 g, Oral, DAILY, Paola Massiel Simon MD, 17 g at 10/14/19 1000   bisacodyl (DULCOLAX) suppository 10 mg, 10 mg, Rectal, DAILY PRN, Massiel Guevara MD 
  aspirin delayed-release tablet 81 mg, 81 mg, Oral, BID, Billy Guevara MD, 81 mg at 10/14/19 1000 
  0.9% sodium chloride infusion 250 mL, 250 mL, IntraVENous, PRN, Massiel Guevara MD 
  ceFAZolin (ANCEF) 1 g in 0.9% sodium chloride (MBP/ADV) 50 mL, 1 g, IntraVENous, Q12H, Lyssa Sanchez, DO, Last Rate: 100 mL/hr at 10/14/19 0316, 1 g at 10/14/19 0316   QUEtiapine (SEROquel) tablet 25 mg, 25 mg, Oral, QHS, Billy Guevara MD, 25 mg at 10/13/19 2055   balsam peru-castor oil (VENELEX) ointment, , Topical, BID, Massiel Guevara MD 
  [Held by provider] furosemide (LASIX) injection 20 mg, 20 mg, IntraVENous, DAILY, Elvi Dickson MD, 20 mg at 10/11/19 1107   hydrALAZINE (APRESOLINE) 20 mg/mL injection 10 mg, 10 mg, IntraVENous, Q6H PRN, Billy Guevara MD, 10 mg at 10/08/19 0423   calcium-vitamin D (OS-OLEKSANDR) 500 mg-200 unit tablet, 1 Tab, Oral, TID WITH MEALS, Massiel Guevara MD, 1 Tab at 10/14/19 0711 
  docusate sodium (COLACE) capsule 100 mg, 100 mg, Oral, BID PRN, Massiel Guevara MD 
  metoprolol tartrate (LOPRESSOR) tablet 50 mg, 50 mg, Oral, BID, Billy Guevara MD, 50 mg at 10/14/19 1000 Physical Exam: 
 
Vitals:  
Patient Vitals for the past 24 hrs: 
 Temp Pulse Resp BP SpO2  
10/14/19 0957 98.4 °F (36.9 °C) 65 18 173/75 94 % 10/14/19 0314 97.6 °F (36.4 °C) 70 16 178/78 93 % 10/13/19 1917 97.9 °F (36.6 °C) (!) 59 15 163/61 98 % 10/13/19 1501 97.9 °F (36.6 °C) (!) 55 17 150/56 97 % · GEN: NAD 
· HEENT: no scleral icterus,   no thrush · CV: S1, S2 heard regularly, JACQUELIN heard · Lungs: trace crackles heard, no wheezing · Abdomen: soft, non distended, non tender, · Extremities: no edema · Neuro: Alert to self, place ( knew he was in a hospital in [facility]), followed simple commands · Skin: no rash · MSK: + L hip area post op dressing , no erythema seen around dressing Labs:  
Recent Results (from the past 24 hour(s)) MAGNESIUM Collection Time: 10/14/19  3:39 AM  
Result Value Ref Range Magnesium 2.0 1.6 - 2.4 mg/dL METABOLIC PANEL, BASIC Collection Time: 10/14/19  3:39 AM  
Result Value Ref Range Sodium 141 136 - 145 mmol/L Potassium 4.2 3.5 - 5.1 mmol/L Chloride 109 (H) 97 - 108 mmol/L  
 CO2 29 21 - 32 mmol/L Anion gap 3 (L) 5 - 15 mmol/L Glucose 108 (H) 65 - 100 mg/dL BUN 23 (H) 6 - 20 MG/DL Creatinine 1.39 (H) 0.70 - 1.30 MG/DL  
 BUN/Creatinine ratio 17 12 - 20 GFR est AA 58 (L) >60 ml/min/1.73m2 GFR est non-AA 48 (L) >60 ml/min/1.73m2 Calcium 8.9 8.5 - 10.1 MG/DL  
PHOSPHORUS Collection Time: 10/14/19  3:39 AM  
Result Value Ref Range Phosphorus 2.0 (L) 2.6 - 4.7 MG/DL  
CBC WITH AUTOMATED DIFF Collection Time: 10/14/19  3:39 AM  
Result Value Ref Range WBC 10.5 4.1 - 11.1 K/uL  
 RBC 2.91 (L) 4.10 - 5.70 M/uL HGB 8.5 (L) 12.1 - 17.0 g/dL HCT 26.7 (L) 36.6 - 50.3 % MCV 91.8 80.0 - 99.0 FL  
 MCH 29.2 26.0 - 34.0 PG  
 MCHC 31.8 30.0 - 36.5 g/dL  
 RDW 14.5 11.5 - 14.5 % PLATELET 114 751 - 684 K/uL MPV 10.3 8.9 - 12.9 FL  
 NRBC 0.0 0  WBC ABSOLUTE NRBC 0.00 0.00 - 0.01 K/uL NEUTROPHILS 62 32 - 75 % LYMPHOCYTES 22 12 - 49 % MONOCYTES 8 5 - 13 % EOSINOPHILS 7 0 - 7 % BASOPHILS 0 0 - 1 % IMMATURE GRANULOCYTES 1 (H) 0.0 - 0.5 % ABS. NEUTROPHILS 6.6 1.8 - 8.0 K/UL  
 ABS. LYMPHOCYTES 2.3 0.8 - 3.5 K/UL  
 ABS. MONOCYTES 0.8 0.0 - 1.0 K/UL  
 ABS. EOSINOPHILS 0.8 (H) 0.0 - 0.4 K/UL  
 ABS. BASOPHILS 0.0 0.0 - 0.1 K/UL  
 ABS. IMM. GRANS. 0.1 (H) 0.00 - 0.04 K/UL  
 DF AUTOMATED    
SAMPLES BEING HELD Collection Time: 10/14/19  3:39 AM  
Result Value Ref Range SAMPLES BEING HELD 1lav COMMENT   Add-on orders for these samples will be processed based on acceptable specimen integrity and analyte stability, which may vary by analyte. URINALYSIS W/MICROSCOPIC Collection Time: 10/14/19  7:49 AM  
Result Value Ref Range Color YELLOW/STRAW Appearance CLEAR CLEAR Specific gravity 1.012 1.003 - 1.030    
 pH (UA) 7.0 5.0 - 8.0 Protein TRACE (A) NEG mg/dL Glucose NEGATIVE  NEG mg/dL Ketone NEGATIVE  NEG mg/dL Bilirubin NEGATIVE  NEG Blood NEGATIVE  NEG Urobilinogen 0.2 0.2 - 1.0 EU/dL Nitrites NEGATIVE  NEG Leukocyte Esterase NEGATIVE  NEG    
 WBC 0-4 0 - 4 /hpf  
 RBC 0-5 0 - 5 /hpf Epithelial cells FEW FEW /lpf Bacteria NEGATIVE  NEG /hpf Hyaline cast 0-2 0 - 5 /lpf URINE CULTURE HOLD SAMPLE Collection Time: 10/14/19  7:49 AM  
Result Value Ref Range Urine culture hold URINE ON HOLD IN MICROBIOLOGY DEPT FOR 3 DAYS. IF UNPRESERVED URINE IS SUBMITTED, IT CANNOT BE USED FOR ADDITIONAL TESTING AFTER 24 HRS, RECOLLECTION WILL BE REQUIRED. Microbiology Data: 
  
  Blood: 10/6/19 
    
Component Value Ref Range & Units Status Special Requests: NO SPECIAL REQUESTS    Final  
Culture result: NO GROWTH 5 DAYS    Final  
Result History 10/9/19 joint fluid  
    
Component Value Ref Range & Units Status Special Requests: NO SPECIAL REQUESTS    Preliminary GRAM STAIN OCCASIONAL WBCS SEEN    Preliminary GRAM STAIN NO ORGANISMS SEEN    Preliminary Culture result:    Preliminary Culture performed on Fluid swab specimen Culture result: Abnormal     Preliminary RARE STAPHYLOCOCCUS EPIDERMIDIS Susceptibility Staphylococcus epidermidis JUAREZ (Preliminary) Ampicillin/sulbactam ($) <=8/4 ug/mL S Cefazolin ($) <=4 ug/mL S Ciprofloxacin ($) <=1 ug/mL S Clindamycin ($) <=0.5 ug/mL S Daptomycin ($$$$$) <=0.5 ug/mL S Erythromycin ($$$$) <=0.5 ug/mL S Gentamicin ($) <=4 ug/mL S Levofloxacin ($) <=1 ug/mL S Linezolid ($$$$$) <=1 ug/mL S   
 Oxacillin <=0.25 ug/mL S Rifampin ($$$$) <=1 ug/mL S1 Tetracycline <=4 ug/mL S Trimeth/Sulfa <=0.5/9.5 u... S Vancomycin ($) 1 ug/mL S   
     
1 Rifampin is not to be used for mono-therapy. Operative cultures 10/12/19 Hip L Specimen Information: Hip  
    
Component Value Ref Range & Units Status Special Requests: BODY FLUID   Preliminary GRAM STAIN OCCASIONAL WBCS SEEN    Preliminary GRAM STAIN NO ORGANISMS SEEN    Preliminary Culture result:    Preliminary Culture performed on Fluid swab specimen Culture result:    Preliminary No growth thus far, holding 14 days.   
 
    
Component Value Ref Range & Units Status Special Requests: BODY FLUID   Preliminary Culture result:    Preliminary No growth thus far, holding 14 days. Result History Component Value Ref Range & Units Status Special Requests: BODY FLUID   Preliminary Culture result: NO FUNGUS ISOLATED 1 DAY    Preliminary Result History Pathology Results: 
 
Imaging: TTE 
 10/7/19 · Left Ventricle: Normal cavity size. Mild concentric hypertrophy. Mild-to-moderate systolic dysfunction. Estimated left ventricular ejection fraction is 41 - 45%. No regional wall motion abnormality noted. Moderate (grade 2) left ventricular diastolic dysfunction. · Left Atrium: Moderately dilated left atrium. · Right Atrium: Moderately dilated right atrium. · Interatrial Septum: Color flow Doppler was used. · Aortic Valve: Prosthetic aortic valve. Aortic valve mean gradient is 26 mmHg. 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 Prosthesis is normal. Prosthetic valve function is insufficient. There is mild paravalvular leaking. Mild to moderate aortic valve regurgitation is present. · Mitral Valve: Moderate mitral annular calcification. Mild mitral valve regurgitation is present. · Tricuspid Valve: Mild tricuspid valve regurgitation is present. · Pulmonary Artery: There is no evidence of pulmonary hypertension. · IVC/Hepatic Veins: Severely elevated central venous pressure (15+ mmHg); IVC diameter is larger than 21 mm and collapses less than 50% with respiration. Echo Findings Left Ventricle Normal cavity size. Mild concentric hypertrophy. The muscle mass is normal. The cavity shape is normal. Mild-to-moderate systolic dysfunction. The estimated ejection fraction is 41 - 45%. No regional wall motion abnormality noted. End-systolic volume is normal. There is moderate (grade 2) left ventricular diastolic dysfunction. Normal left ventricular diastolic pressure. End-diastolic volume is normal.  
Wall Scoring The following segments are akinetic: apical inferior. All other segments are normal.  
  
  
  
Left Atrium Moderately dilated left atrium. No atrial septal defect present. Right Ventricle Normal cavity size, wall thickness and global systolic function. Right Atrium Moderately dilated right atrium. Interatrial Septum Color flow Doppler was used. No atrial septal defect present. No interatrial septal aneurysm present. Dominguez Antu Aortic Valve Normal valve structure, trileaflet valve structure and no stenosis. Prosthetic aortic valve. Normal aortic leaflet mobility. Aortic valve mean gradient is 26 mmHg. Mild to moderate aortic valve regurgitation. There is a bioprosthetic valve present. Mild paravalvular, prosthetic valve insufficiency present. The prosthetic valve is normal.  
Mitral Valve No stenosis. Leaflet calcification. There is moderate anterior leaflet calcification diffusely. There is moderate posterior leaflet calcification diffusely. Normal mitral valve leaflet separation. Moderate mitral annular calcification. Mild regurgitation. Tricuspid Valve Normal valve structure and no stenosis. Mild tricuspid valve regurgitation. There is no evidence of pulmonary hypertension. Pulmonic Valve Pulmonic valve not well visualized. Normal valve structure, no stenosis and no regurgitation. Aorta Normal aortic root, ascending aortic, and aortic arch. IVC/Hepatic Veins Severely elevated central venous pressure (15+ mmHg); IVC diameter is larger than 21 mm and collapses less than 50% with respiration. Pericardium Normal pericardium and no evidence of pericardial effusion. CT 10/10/19 
  
FINDINGS: Patient is post left hip hemiarthroplasty. There is no evidence of 
periprosthetic fracture. No malalignment. 
  
There is edema in the soft tissues laterally. There is an ill-defined 3.8 x 3.7 
x 7.7 cm low density collection lateral to the left greater trochanter. There is 
diffuse edema in the subcutaneous tissues 
  
Prostate is surgically absent. Extensive vascular calcifications. Intrapelvic 
bowel is nondistended. Probable right-sided hydrocele. 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 
bone formation.  
  
IMPRESSION IMPRESSION: 
1. Postoperative collection lateral to the left hip 2. No evidence of periprosthetic fracture 3. Early heterotopic bone formation 
  
CT chest 10/6/19 FINDINGS: 
  
THYROID: Unremarkable. MEDIASTINUM/KIA: No mass or lymphadenopathy. HEART/PERICARDIUM: Mildly enlarged. Extensive coronary atherosclerosis. Prior CABG. LUNGS/PLEURA: Emphysema. Focal areas of atelectasis, bronchiectasis and 
calcification in the left lower lobe and to lesser degree inferior lingula. Small bilateral pleural effusions, portion of which on the left extends along 
the major fissure. Minimal right lower lobe calcification and 
bronchiectasis/atelectasis. No pulmonary edema or pneumothorax. INCIDENTALLY IMAGED UPPER ABDOMEN: Probable left renal cysts, partly visualized. Prior cholecystectomy. BONES: No destructive bone lesion. ADDITIONAL COMMENTS:  N/A. 
  
IMPRESSION IMPRESSION: 
  
1.  Small bilateral pleural effusions with areas of bilateral lower lobe and 
 lingular atelectasis and bronchiectasis as above. No pulmonary edema or 
pneumothorax. 2. Cardiomegaly with coronary atherosclerosis and prior CABG. X ray 10/12/19 IMPRESSION IMPRESSION: There is a left hip prosthesis in gross anatomic alignment without 
evidence for orthopedic hardware complication. Pelvic US 10/14/19 pending Procedures: 
10/12/19 Resection of arthroplasty, left hip, with placement of antibiotic spacer. 9/4/19 L hip hemiarthroplasty Assessment / Plan:  
 
Mr Moises Capellan is a 80year old gentleman wt hx of prosthetic AV, femur fracture S/P L hip hemiarthroplasty Sep 2019, CAD wt CABG, admitted on 10/6/19 with fever, and weakness. From review of records, there were concerns of pneumonia and he has been on Levaquin. No cough noted per nursing. Patient denied cough as well. Has issues with aspiration and swallowing per discussion with daughter on phone today From chart review: He had it aspirated per notes on 10/6/19. No fluid counts noted but cultures now resulted as MSSE. He received a dose of Vancomycin IV initially on admission 10/6/19  and also Zosyn 10/6-10/10/19. He has since been on Levaquin from 10/6/19. He received Cefazolin perioperatively He had resection of arthroplasty, left hip, with placement of antibiotic spacer WBC on admission 11.8 and he ad a fever of 101.1 on admission. From 10/12/19  operative report, \" There was active purulent drainage. There was a 2-cm defect in the IT band and this drainage continued deep to surround the implant, which was cloudy\". Cultures pending for today's surgery. 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 Will plan for 6 week duration till 11/23/19 based on tolerance. Will need to discuss suppression after that depending course, goals of care given hardware in place Check weekly CBC wt diff, CMP, ESR and CRP If febrile , repeat blood cultures Side effects of antibiotics including GI, risk for CDI, renal, hematological discussed Synergy with rifampin not chosen given age, risk for adverse effects and drug drug interactions Yogurt/Probiotic encouraged 2) Prosthetic AV If febrile check blood cx If bacteremia, may need MICHELLE based on goals of care 3)  B/L effusion, risk for aspiration Doubt he has active pneumonia DC Levaquin ( S/P  5 days already ) Recommend using incentive spirometry 4) YAYA: per primary team  
Renally dose antibiotics Improving Cr 5) CAD, CABG,  
 
6) Cognitive issues per daughter since 9/2019 Per primary team  
 
 
 
 
D/W with Ms Javad Bonner ( daughter ) in person today Thank for the opportunity to participate in the care of this patient. Please contact with questions or concerns.   
 
 
 
Elias Croft,  
10:48 AM

## 2019-10-14 NOTE — PROGRESS NOTES
2nd attempt made at straight catheterization with charge nurse unsuccessful again. Patient complaining of sever pain during procedure and no output was collected. Dr Inga Gomes notified. Urinalysis ordered and stat ultrasound.

## 2019-10-14 NOTE — PROGRESS NOTES
Problem: Mobility Impaired (Adult and Pediatric) Goal: *Acute Goals and Plan of Care (Insert Text) Description FUNCTIONAL STATUS PRIOR TO ADMISSION: Baseline unknown-suspect assist for ADLs and Jase-supervision for ambulation with RW (?). 
**Update-patient lived alone until fracture and then in rehab. Went home with daughter to assist but unable to manage with admission for infection. HOME SUPPORT PRIOR TO ADMISSION: Pt lives with his DTR per RN. **Update-daughter was staying with patient after discharge from rehab but lives in [facility] and unable to stay long term with patient Physical Therapy Goals Initiated 10/14/2019 1. Patient will move from supine to sit and sit to supine  and roll side to side in bed with supervision/set-up within 4 days. 2. Patient will perform sit to stand with supervision/set-up within 4 days. 3. Patient will ambulate with supervision/set-up for 150 feet with the least restrictive device within 4 days. 4. Patient will ascend/descend 7 stairs with 1 handrail(s) with minimal assistance/contact guard assist within 4 days. 5. Patient will perform  home exercise program per protocol with minimal assistance/contact guard assist within 4 days. Initiated 10/7/2019 1. Patient will move from supine to sit and sit to supine  in bed with minimal assistance/contact guard assist within 7 day(s). 2.  Patient will transfer from bed to chair and chair to bed with minimal assistance/contact guard assist using the least restrictive device within 7 day(s). 3.  Patient will perform sit to stand with minimal assistance/contact guard assist within 7 day(s). 4.  Patient will ambulate with supervision/set-up for 50 feet with the least restrictive device within 7 day(s). 5.  PT to assess stairs as appropriate. Outcome: Progressing Towards Goal 
 PHYSICAL THERAPY REEVALUATION Patient: Meghan Ivan (7 y.o. male) Date: 10/14/2019 Primary Diagnosis: Pneumonia [J18.9] Procedure(s) (LRB): 
I&D LEFT HIP AND RESECTION ARTHROPLASTY (Left) 2 Days Post-Op Precautions: WBAT  Fall, Bed Alarm(dementia) ASSESSMENT Based on the objective data described below, the patient presents with decreased mobility following I&D and revision of Left hip . Today limited by pain and overall decreased endurance. He presents with increased need for assist with bed mobility, transfers and gait. Gait is antalgic with bilateral knee flexion and frequent \"dipping\" during gait. Some confusion/memory issues continue but pleasant and cooperative. Feel he will need rehab post discharge. Bryan Rivera Current Level of Function Impacting Discharge (mobility/balance): min to mod assist with bed mobility, transfers and gait. Functional Outcome Measure: The patient scored 30/100 on the Barthel outcome measure which is indicative of high fall risk. Other factors to consider for discharge: family lives in [place] and working on patient final disposition. Patient will benefit from skilled therapy intervention to address the above noted impairments. PLAN : 
Recommendations and Planned Interventions: bed mobility training, transfer training, gait training, therapeutic exercises, patient and family training/education and therapeutic activities Frequency/Duration: Patient will be followed by physical therapy:  twice daily x2 days and then daily to address goals. Recommendation for discharge: (in order for the patient to meet his/her long term goals) Therapy up to 5 days/week in SNF setting This discharge recommendation: 
Has been made in collaboration with the attending provider and/or case management Equipment recommendations for successful discharge (if) home: none SUBJECTIVE:  
Patient stated Orvan Yared feel better.  OBJECTIVE DATA SUMMARY:  
HISTORY:   
Past Medical History:  
Diagnosis Date CAD (coronary artery disease) DDD (degenerative disc disease), lumbar Sciatica Past Surgical History:  
Procedure Laterality Date HX CORONARY ARTERY BYPASS GRAFT Hospital course since last seen and reason for reevaluation: I&D and revision on 10/12/19 Personal factors and/or comorbidities impacting plan of care:  
 
Home Situation Home Environment: Private residence # Steps to Enter: 8 Rails to Enter: Yes Hand Rails : Left One/Two Story Residence: Other (Comment)([facility]) # of Interior Steps: (2 sets 8 steps) Interior Rails: Left Lift Chair Available: No 
Living Alone: Yes Support Systems: Child(danyelle) Patient Expects to be Discharged to[de-identified] Rehabilitation facility Current DME Used/Available at Home: Carola Medico, rollator, Shower chair, Raised toilet seat Tub or Shower Type: Shower EXAMINATION/PRESENTATION/DECISION MAKING:  
Critical Behavior: 
Neurologic State: Alert Orientation Level: Oriented to person, Oriented to place, Disoriented to situation, Disoriented to time Cognition: Follows commands, Appropriate safety awareness, Appropriate decision making, Appropriate for age attention/concentration Safety/Judgement: Decreased insight into deficits, Decreased awareness of need for safety, Decreased awareness of need for assistance Hearing: Auditory Auditory Impairment: None Skin:  dressing intact Edema: none Range Of Motion: 
AROM: Generally decreased, functional 
  
  
  
PROM: Generally decreased, functional 
  
  
  
Strength:   
Strength: Generally decreased, functional 
  
  
  
  
  
  
Tone & Sensation:  
Tone: Normal 
  
  
  
  
  
  
  
  
   
Coordination: 
Coordination: Generally decreased, functional 
Vision:  
  
Functional Mobility: 
Bed Mobility: 
  
Supine to Sit: Minimum assistance; Additional time Scooting: Moderate assistance Transfers: 
Sit to Stand: Minimum assistance Stand to Sit: Moderate assistance Bed to Chair: Moderate assistance Balance:  
Sitting: Impaired; Without support Sitting - Static: Good (unsupported) Sitting - Dynamic: Fair (occasional) Standing: Impaired; With support Standing - Static: Constant support; Fair 
Standing - Dynamic : Constant support; Bravo Omid Ambulation/Gait Training: 
Distance (ft): 60 Feet (ft) Assistive Device: Gait belt;Walker, rolling Ambulation - Level of Assistance: Moderate assistance Gait Abnormalities: Antalgic;Decreased step clearance;Shuffling gait; Step to gait Left Side Weight Bearing: As tolerated Stance: Left decreased Speed/Milka: Pace decreased (<100 feet/min) Step Length: Right shortened;Left shortened Functional Measure: 
Barthel Index: 
 
Bathin Bladder: 5 Bowels: 5 Groomin Dressin Feedin Mobility: 0 Stairs: 0 Toilet Use: 0 Transfer (Bed to Chair and Back): 10 Total: 30/100 The Barthel ADL Index: Guidelines 1. The index should be used as a record of what a patient does, not as a record of what a patient could do. 2. The main aim is to establish degree of independence from any help, physical or verbal, however minor and for whatever reason. 3. The need for supervision renders the patient not independent. 4. A patient's performance should be established using the best available evidence. Asking the patient, friends/relatives and nurses are the usual sources, but direct observation and common sense are also important. However direct testing is not needed. 5. Usually the patient's performance over the preceding 24-48 hours is important, but occasionally longer periods will be relevant. 6. Middle categories imply that the patient supplies over 50 per cent of the effort. 7. Use of aids to be independent is allowed. Cassidy Carrizales., Barthel, D.W. (7258). Functional evaluation: the Barthel Index. [address] [facility] (14)2. EMEKA Jaquez, Valdez Jo., Monalisa Bower., Kali, [address] ().  Measuring the change indisability after inpatient rehabilitation; comparison of the responsiveness of the Barthel Index and Functional [place] Measure. Journal of Neurology, Neurosurgery, and Psychiatry, 66(4), 570-766. DIOR Dubois.TERA, KERRY Ornelas, & Jess Salazar M.A. (2004.) Assessment of post-stroke quality of life in cost-effectiveness studies: The usefulness of the Barthel Index and the EuroQoL-5D. [facility], 13, 113-18 After treatment patient left in no apparent distress:  
Sitting in chair, Call bell within reach, Bed / chair alarm activated and Caregiver / family present COMMUNICATION/EDUCATION:  
The patients plan of care was discussed with: Registered Nurse. Fall prevention education was provided and the patient/caregiver indicated understanding., Patient/family have participated as able in goal setting and plan of care. and Patient/family agree to work toward stated goals and plan of care. Thank you for this referral. 
Priscila Salvador, PT Time Calculation: 20 mins

## 2019-10-14 NOTE — PROGRESS NOTES
Physical Therapy Attempted to see patient this afternoon for second of BID therapy sessions. However, patient visibly getting tucked back into bed by PCT who notes he just ambulated with her to the bathroom and had done this once prior since the morning therapy session. Patient endorsed being too fatigued to get right back out of bed again, wishes to rest.  Will follow up tomorrow as appropriate.  
 
Howard Bauer, MS, PT

## 2019-10-15 NOTE — PROGRESS NOTES
Hospitalist Progress Note Peter Bowman MD 
Answering service: [phone] OR [phone] from in house phone Date of Service:  10/15/2019 NAME:  Royal Bhat :  3/18/1927 MRN:  [id number] Admission Summary:  
Patient is a 80year old Male medical history significant for HTN , CAD,and DDD who presents to the Emergency Department accompanied by Daughter via EMS with chief complaint of fever. Patient complains of subjective fever, decreased oral intake  increased weakness after he was discharged from rehab 3 days. 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 He denies chills , Shortness of breath , chest pain , palpitations , leg swelling , syncope or near syncope , headaches , seizures or change in mentation , urinary or bowel complains. 
   
Interval history / Subjective:  
10/15/2019 :for PICC today ,  Family considering options, encouraged hh with PT and home abx Family not fully opposed to this, exam stable no issues on ros. Assessment & Plan:  
 
Sepsis (POA) secondary to L hip prosthetic joint infection - + staph epi - X-ray showed left femoral neck fracture 9/3 
- s/p Left hip hemiarthroplasty 2019 
- CT of the hip with fluid collection. Concern that this may tract deeper. Ortho following for potential surgery - s/p OR 10/12 for hip arthroplasty, antibiotic spacer placement and I and D.  
- Micro growing Staph epi, Methicillin sensitive. - ID consulted, on Cefazolin, with 6 weeks until 19 per Dr. Yashira Peace.  
 
CT findings of bilateral atelectasis - febrile on admission, however with bilateral very small infiltrates in setting of new LE edema and new onset CHF. Possible that findings on CT are due to CHF. - in the setting of fever ,leucocytosis and CXR concerning new bibasilar infiltrates, originally treated for PNA - s/p levofloxacin for potential PNA 
- blood culture NGTD  
- DC IVF  
- Cont to monitor  
  
Acute CHF exacerbation. No known history of CHF. Pulmonary edema. NYHA class 3-4 on admission. H/o known CAD, ?ischemic. 
- Echo with mild to moderate CHF, EF 40-45% - On metoprolol 
- Holding lasix  
- Not on ACE/ARB due to CKD. - Family wishes to follow up with his cardiologist outpatient. - I and O and daily weights. - no s/s of hf 10/15/2019  
  
Bilateral bronchiectasis - possible  2/2 recurrent aspiration pneumonia  
- Not in acute exacerbation (no significant sputum production) - x-ray in 9/3 was unremarkable - No prior CT for comparison 
  
HTN  
- BP at goal  
- Cont home med's  
  
CAD with valvular replacement 
- Stable - Cont home ASA , BB Statins  
  
Increased Generalized weakness  
- likely from the above  
- PT/OT Metabolic encephalopathy - h/o dementia now with intermittent delirium - Add PM Seroquel  
- PRN IV haldol Code status: FULL CODE.  
DVT prophylaxis: Lovenox PTA: home Baseline: Independent with walker Care Plan discussed with: Patient/Family Disposition: TBD Hospital Problems  Date Reviewed: 10/7/2019 Codes Class Noted POA * (Principal) Pneumonia ICD-10-CM: J18.9 ICD-9-CM: 573  10/6/2019 Yes Review of Systems: A comprehensive review of systems was negative except for that written in the HPI. Vital Signs:  
 Last 24hrs VS reviewed since prior progress note. Most recent are: 
Visit Vitals /74 Pulse 68 Temp 98.1 °F (36.7 °C) Resp 18 Ht 5' 10\" (1.778 m) Wt 58.2 kg (128 lb 3.2 oz) SpO2 95% BMI 18.39 kg/m² Intake/Output Summary (Last 24 hours) at 10/15/2019 1114 Last data filed at 10/15/2019 2329 Gross per 24 hour Intake 20 ml Output 1150 ml Net -1130 ml Physical Examination:  
 
 
    Stable exam 10/15/2019 Constitutional:  No acute distress, Sleepy post op ENT:  Oral mucousa dry, oropharynx benign. Resp:  CTA bilaterally. No wheezing/rhonchi/rales. No accessory muscle use CV:  Regular rhythm, normal rate, no murmurs, gallops, rubs GI:  Soft, non distended, non tender. normoactive bowel sounds, no hepatosplenomegaly Musculoskeletal:  No edema, warm, 2+ pulses throughout, L hip with tenderness to palpation, bandage without obvious drainage. Neurologic:  Awake, pleasantly confused. Skin:  Good turgor, no rashes or ulcers Data Review:  
 Review and/or order of clinical lab test 
Review and/or order of tests in the radiology section of CPT Review and/or order of tests in the medicine section of CPT Labs:  
 
Recent Labs 10/15/19 
3088 10/14/19 
7868 WBC 11.8* 10.5 HGB 8.9* 8.5* HCT 27.9* 26.7*  
 235 Recent Labs 10/15/19 
2919 10/14/19 
2838 10/13/19 
3535  141 142  
K 3.9 4.2 4.6 * 109* 108 CO2 30 29 27 BUN 23* 23* 26* CREA 1.34* 1.39* 1.48* * 108* 123* CA 9.4 8.9 8.4* MG 2.0 2.0  --   
PHOS 1.9* 2.0*  -- No results for input(s): SGOT, GPT, ALT, AP, TBIL, TBILI, TP, ALB, GLOB, GGT, AML, LPSE in the last 72 hours. No lab exists for component: AMYP, HLPSE No results for input(s): INR, PTP, APTT, INREXT, INREXT in the last 72 hours. No results for input(s): FE, TIBC, PSAT, FERR in the last 72 hours. No results found for: FOL, RBCF No results for input(s): PH, PCO2, PO2 in the last 72 hours. No results for input(s): CPK, CKNDX, TROIQ in the last 72 hours. No lab exists for component: CPKMB Lab Results Component Value Date/Time Cholesterol, total 127 02/19/2010 11:45 AM  
 HDL Cholesterol 55 02/19/2010 11:45 AM  
 LDL, calculated 63.6 02/19/2010 11:45 AM  
 Triglyceride 42 02/19/2010 11:45 AM  
 CHOL/HDL Ratio 2.3 02/19/2010 11:45 AM  
 
Lab Results Component Value Date/Time  Glucose (POC) 109 (H) 10/06/2019 06:40 PM  
 Glucose (POC) 91 09/04/2019 02:27 PM  
 Glucose (POC) 94 09/03/2019 03:20 PM  
 
Lab Results Component Value Date/Time Color YELLOW/STRAW 10/14/2019 07:49 AM  
 Appearance CLEAR 10/14/2019 07:49 AM  
 Specific gravity 1.012 10/14/2019 07:49 AM  
 pH (UA) 7.0 10/14/2019 07:49 AM  
 Protein TRACE (A) 10/14/2019 07:49 AM  
 Glucose NEGATIVE  10/14/2019 07:49 AM  
 Ketone NEGATIVE  10/14/2019 07:49 AM  
 Bilirubin NEGATIVE  10/14/2019 07:49 AM  
 Urobilinogen 0.2 10/14/2019 07:49 AM  
 Nitrites NEGATIVE  10/14/2019 07:49 AM  
 Leukocyte Esterase NEGATIVE  10/14/2019 07:49 AM  
 Epithelial cells FEW 10/14/2019 07:49 AM  
 Bacteria NEGATIVE  10/14/2019 07:49 AM  
 WBC 0-4 10/14/2019 07:49 AM  
 RBC 0-5 10/14/2019 07:49 AM  
 
 
 
Medications Reviewed:  
 
Current Facility-Administered Medications Medication Dose Route Frequency  traMADol (ULTRAM) tablet 50 mg  50 mg Oral Q6H PRN  
 acetaminophen (TYLENOL) tablet 1,000 mg  1,000 mg Oral Q8H  
 haloperidol lactate (HALDOL) injection 2 mg  2 mg IntraVENous Q6H PRN  
 sodium chloride (NS) flush 5-40 mL  5-40 mL IntraVENous Q8H  
 sodium chloride (NS) flush 5-40 mL  5-40 mL IntraVENous PRN  
 hydrOXYzine HCl (ATARAX) tablet 10 mg  10 mg Oral Q8H PRN  
 naloxone (NARCAN) injection 0.4 mg  0.4 mg IntraVENous PRN  
 senna-docusate (PERICOLACE) 8.6-50 mg per tablet 1 Tab  1 Tab Oral BID  polyethylene glycol (MIRALAX) packet 17 g  17 g Oral DAILY  bisacodyl (DULCOLAX) suppository 10 mg  10 mg Rectal DAILY PRN  
 aspirin delayed-release tablet 81 mg  81 mg Oral BID  
 0.9% sodium chloride infusion 250 mL  250 mL IntraVENous PRN  
 ceFAZolin (ANCEF) 1 g in 0.9% sodium chloride (MBP/ADV) 50 mL  1 g IntraVENous Q12H  
 QUEtiapine (SEROquel) tablet 25 mg  25 mg Oral QHS  balsam peru-castor oil (VENELEX) ointment   Topical BID  [Held by provider] furosemide (LASIX) injection 20 mg  20 mg IntraVENous DAILY  hydrALAZINE (APRESOLINE) 20 mg/mL injection 10 mg  10 mg IntraVENous Q6H PRN  
 calcium-vitamin D (OS-OLEKSANDR) 500 mg-200 unit tablet  1 Tab Oral TID WITH MEALS  docusate sodium (COLACE) capsule 100 mg  100 mg Oral BID PRN  
 metoprolol tartrate (LOPRESSOR) tablet 50 mg  50 mg Oral BID  
 
______________________________________________________________________ EXPECTED LENGTH OF STAY: 8d 21h ACTUAL LENGTH OF STAY:          9 Markus Ellington MD

## 2019-10-15 NOTE — PROGRESS NOTES
JYOTI 
-CM notes patient is a Sound Bundle. - Patient discharged from [facility] for rehab on 10/2 
- Patient open to [facility] 
- Patient need IV Abx for next 6 weeks - PT recommend; therapy 5 days/week in SNF setting 
-CM sent sound bundle escalation email for disposition. CM will follow BELEN Peters/SHARMIN

## 2019-10-15 NOTE — PROCEDURES
PICC Placement Note PRE-PROCEDURE VERIFICATION Correct Procedure: yes Correct Site:  yes Temperature: Temp: 98.1 °F (36.7 °C), Temperature Source: Temp Source: Oral 
Recent Labs 10/15/19 
6165 BUN 23* CREA 1.34*  WBC 11.8* Allergies: Patient has no known allergies. Education materials, including PICC Booklet, for PICC Care given to patient: yes. See Patient Education activity for further details. PROCEDURE DETAIL A double lumen PICC line was started for Home IV Therapy. The following documentation is in addition to the PICC properties in the lines/airways flowsheet : 
Lot #: [id number] Was xylocaine 1% used intradermally:  yes Catheter Length: 42 (cm) Vein Selection for PICC:right basilic Central Line Bundle followed yes Complication Related to Insertion: none The placement was verified by ECG/Sapiens technology: The  tip location is on the right side and the tip is in the  superior vena cava. See ECG results for PICC tip placement. Report given to nurse Annette Goetz. Line is okay to use.  
 
Maria T Meza RN

## 2019-10-15 NOTE — PROGRESS NOTES
Spoke with Dr. Supriya Leblanc via phone and informed him that patient continues to have urine retention. He currently has 400 in at this time and does he want to place a coe. Per Dr. Supriya Leblanc, he want to continue having pt check every 6 hrs for bladder scan and straight cath as needed.

## 2019-10-15 NOTE — PROGRESS NOTES
Physical Therapy Reviewed chart and attempted for PM treatment. Pt is currently having sterile  procedure in room and is not available. Will defer at this time and continue to follow.

## 2019-10-15 NOTE — PROGRESS NOTES
Problem: Mobility Impaired (Adult and Pediatric) Goal: *Acute Goals and Plan of Care (Insert Text) Description FUNCTIONAL STATUS PRIOR TO ADMISSION: Baseline unknown-suspect assist for ADLs and Jase-supervision for ambulation with RW (?). 
**Update-patient lived alone until fracture and then in rehab. Went home with daughter to assist but unable to manage with admission for infection. HOME SUPPORT PRIOR TO ADMISSION: Pt lives with his DTR per RN. **Update-daughter was staying with patient after discharge from rehab but lives in [facility] and unable to stay long term with patient Physical Therapy Goals Initiated 10/14/2019 1. Patient will move from supine to sit and sit to supine  and roll side to side in bed with supervision/set-up within 4 days. 2. Patient will perform sit to stand with supervision/set-up within 4 days. 3. Patient will ambulate with supervision/set-up for 150 feet with the least restrictive device within 4 days. 4. Patient will ascend/descend 7 stairs with 1 handrail(s) with minimal assistance/contact guard assist within 4 days. 5. Patient will perform  home exercise program per protocol with minimal assistance/contact guard assist within 4 days. Initiated 10/7/2019 1. Patient will move from supine to sit and sit to supine  in bed with minimal assistance/contact guard assist within 7 day(s). 2.  Patient will transfer from bed to chair and chair to bed with minimal assistance/contact guard assist using the least restrictive device within 7 day(s). 3.  Patient will perform sit to stand with minimal assistance/contact guard assist within 7 day(s). 4.  Patient will ambulate with supervision/set-up for 50 feet with the least restrictive device within 7 day(s). 5.  PT to assess stairs as appropriate. Outcome: Progressing Towards Goal 
  
PHYSICAL THERAPY TREATMENT Patient: Liliya Novak (4 y.o. male) Date: 10/15/2019 Diagnosis: Pneumonia [J18.9] Pneumonia Procedure(s) (LRB): 
I&D LEFT HIP AND RESECTION ARTHROPLASTY (Left) 3 Days Post-Op Precautions: Fall, Bed Alarm(dementia) Chart, physical therapy assessment, plan of care and goals were reviewed. ASSESSMENT Patient continues with skilled PT services and is progressing towards goals. Pt able to increase gait tolerance. Pt is reporting no pain with mobiltiy. Pt had decrease LE stability with gait with periodic knee buckling and poor positioning within the walker. Current Level of Function Impacting Discharge (mobility/balance): min A for supine to sit and sit to stand. Min to mod A for gait with rolling walker Other factors to consider for discharge: decrease upright stability, fall risk, decrease activity tolerance, below baseline PLAN : 
Patient continues to benefit from skilled intervention to address the above impairments. Continue treatment per established plan of care. to address goals. Recommendation for discharge: (in order for the patient to meet his/her long term goals) Therapy up to 5 days/week in SNF setting This discharge recommendation: 
Has been made in collaboration with the attending provider and/or case management Equipment recommendations for successful discharge (if) home: rolling walker SUBJECTIVE:  
Patient stated I have been up earlier.  OBJECTIVE DATA SUMMARY:  
Critical Behavior: 
Neurologic State: Alert Orientation Level: Oriented to person Cognition: Follows commands Safety/Judgement: Decreased insight into deficits, Decreased awareness of need for safety, Decreased awareness of need for assistance Functional Mobility Training: 
Bed Mobility: 
  
Supine to Sit: Minimum assistance Transfers: 
Sit to Stand: Minimum assistance Stand to Sit: Minimum assistance(decrease control) Balance: 
Sitting: Intact; With support Standing: Impaired Standing - Static: Fair Standing - Dynamic : Fair Ambulation/Gait Training: 
Distance (ft): 80 Feet (ft) Assistive Device: Gait belt;Walker, rolling Ambulation - Level of Assistance: Minimal assistance; Moderate assistance Gait Abnormalities: Decreased step clearance(knee buckling, decrease position within walker) Left Side Weight Bearing: As tolerated Step Length: Left shortened;Right shortened Stairs: Therapeutic Exercises:  
 
Pain Rating: No complaints Activity Tolerance:  
limited Please refer to the flowsheet for vital signs taken during this treatment. After treatment patient left in no apparent distress:  
Sitting in chair, Call bell within reach and Bed / chair alarm activated COMMUNICATION/COLLABORATION:  
The patients plan of care was discussed with: Registered Nurse Boo Khan PTA Time Calculation: 24 mins

## 2019-10-15 NOTE — PROCEDURES
Left voicemail for daughter Eliu Nichols to call for PICC consent. Daughter called back,attempted to obtain consent via phone. She asked that we wait until she gets here to sign consent. Nurse aware. Order for PICC verified. Consent obtained from daughter at bedside,after risk,benefits and procedure explained and questions answered.

## 2019-10-15 NOTE — PROGRESS NOTES
Infectious Disease Progress Note HPI: 
 
Mr Juan M Carrillo seen Seems on and off confused and delirius No recent pain medications given per RN 
D/W RN He denied  pain, fever, chills, cough, nausea, vomiting, diarrhea Says he wants to go home Per RN , plans for straight cath due to urinary retention Medications: No current facility-administered medications on file prior to encounter. Current Outpatient Medications on File Prior to Encounter Medication Sig Dispense Refill  docusate sodium (COLACE) 100 mg capsule Take 1 Cap by mouth two (2) times daily as needed for Constipation for up to 90 days. 60 Cap 0  
 calcium-vitamin D (OYSTER SHELL) 500 mg(1,250mg) -200 unit per tablet Take 1 Tab by mouth three (3) times daily (with meals). 90 Tab 0  
 metoprolol tartrate (LOPRESSOR) 50 mg tablet Take 1 Tab by mouth two (2) times a day. 60 Tab 0  
 acetaminophen (TYLENOL) 500 mg tablet Take 1 Tab by mouth every six (6) hours. 60 Tab 0  
 aspirin delayed-release 325 mg tablet Take 325 mg by mouth daily. Current Facility-Administered Medications:  
  traMADol (ULTRAM) tablet 50 mg, 50 mg, Oral, Q6H PRN, Gen Li NP, 50 mg at 10/13/19 3770   acetaminophen (TYLENOL) tablet 1,000 mg, 1,000 mg, Oral, Q8H, Sanam Saldana MD, 1,000 mg at 10/15/19 0650 
  haloperidol lactate (HALDOL) injection 2 mg, 2 mg, IntraVENous, Q6H PRN, Jean Pierre Saldana MD 
  sodium chloride (NS) flush 5-40 mL, 5-40 mL, IntraVENous, Q8H, Billy Guevara MD, Stopped at 10/15/19 0600 
  sodium chloride (NS) flush 5-40 mL, 5-40 mL, IntraVENous, PRN, Billy Guevara MD, 10 mL at 10/15/19 0407   hydrOXYzine HCl (ATARAX) tablet 10 mg, 10 mg, Oral, Q8H PRN, Ron Guevara MD 
  naloxone [facility]) injection 0.4 mg, 0.4 mg, IntraVENous, PRN, Ron Guevara MD 
  senna-docusate (PERICOLACE) 8.6-50 mg per tablet 1 Tab, 1 Tab, Oral, BID, Ora Chacko MD, 1 Tab at 10/15/19 1052   polyethylene glycol (MIRALAX) packet 17 g, 17 g, Oral, DAILY, Billy Guevara MD, 17 g at 10/15/19 1056   bisacodyl (DULCOLAX) suppository 10 mg, 10 mg, Rectal, DAILY PRN, Renard Guevara MD 
  aspirin delayed-release tablet 81 mg, 81 mg, Oral, BID, Billy Guevara MD, 81 mg at 10/15/19 1052 
  0.9% sodium chloride infusion 250 mL, 250 mL, IntraVENous, PRN, Renard Guevara MD 
  ceFAZolin (ANCEF) 1 g in 0.9% sodium chloride (MBP/ADV) 50 mL, 1 g, IntraVENous, Q12H, Lyssa Sanchez DO, Last Rate: 100 mL/hr at 10/15/19 0406, 1 g at 10/15/19 0406   QUEtiapine (SEROquel) tablet 25 mg, 25 mg, Oral, QHS, Billy Guevara MD, 25 mg at 10/14/19 2140   balsam peru-castor oil (VENELEX) ointment, , Topical, BID, Renard Guevara MD 
  [Held by provider] furosemide (LASIX) injection 20 mg, 20 mg, IntraVENous, DAILY, Sofía Lanza MD, 20 mg at 10/11/19 1107   hydrALAZINE (APRESOLINE) 20 mg/mL injection 10 mg, 10 mg, IntraVENous, Q6H PRN, Billy Guevara MD, 10 mg at 10/08/19 0423   calcium-vitamin D (OS-OLEKSANDR) 500 mg-200 unit tablet, 1 Tab, Oral, TID WITH MEALS, Renard Guevara MD, 1 Tab at 10/15/19 1318   docusate sodium (COLACE) capsule 100 mg, 100 mg, Oral, BID PRN, Renard Guevara MD 
  metoprolol tartrate (LOPRESSOR) tablet 50 mg, 50 mg, Oral, BID, Billy Guevara MD, 50 mg at 10/15/19 1052 Physical Exam: 
 
Vitals:  
Patient Vitals for the past 24 hrs: 
 Temp Pulse Resp BP SpO2  
10/15/19 1440 97.3 °F (36.3 °C) 62 18 159/71 96 % 10/15/19 0807 98.1 °F (36.7 °C) 68 18 177/74 95 % 10/15/19 0333 98.2 °F (36.8 °C) 72 18 192/84 96 % 10/14/19 2052 98.7 °F (37.1 °C) 65 18 169/58 94 % · GEN: NAD 
· HEENT: no scleral icterus,   no thrush · CV: S1, S2 heard regularly, JACQUELIN heard · Lungs: trace crackles heard, no wheezing · Abdomen: soft, non distended, non tender, · Extremities: no edema · Neuro: Alert to self, did not know he was in hospital, follows commands, verbal , moves all extremities, no facial droop or slurry speech  
· Skin: no rash · MSK: + L hip dressing , no erythema seen around dressing Labs:  
Recent Results (from the past 24 hour(s)) MAGNESIUM Collection Time: 10/15/19  3:37 AM  
Result Value Ref Range Magnesium 2.0 1.6 - 2.4 mg/dL METABOLIC PANEL, BASIC Collection Time: 10/15/19  3:37 AM  
Result Value Ref Range Sodium 142 136 - 145 mmol/L Potassium 3.9 3.5 - 5.1 mmol/L Chloride 109 (H) 97 - 108 mmol/L  
 CO2 30 21 - 32 mmol/L Anion gap 3 (L) 5 - 15 mmol/L Glucose 108 (H) 65 - 100 mg/dL BUN 23 (H) 6 - 20 MG/DL Creatinine 1.34 (H) 0.70 - 1.30 MG/DL  
 BUN/Creatinine ratio 17 12 - 20 GFR est AA >60 >60 ml/min/1.73m2 GFR est non-AA 50 (L) >60 ml/min/1.73m2 Calcium 9.4 8.5 - 10.1 MG/DL  
PHOSPHORUS Collection Time: 10/15/19  3:37 AM  
Result Value Ref Range Phosphorus 1.9 (L) 2.6 - 4.7 MG/DL  
CBC WITH AUTOMATED DIFF Collection Time: 10/15/19  3:37 AM  
Result Value Ref Range WBC 11.8 (H) 4.1 - 11.1 K/uL  
 RBC 3.04 (L) 4.10 - 5.70 M/uL HGB 8.9 (L) 12.1 - 17.0 g/dL HCT 27.9 (L) 36.6 - 50.3 % MCV 91.8 80.0 - 99.0 FL  
 MCH 29.3 26.0 - 34.0 PG  
 MCHC 31.9 30.0 - 36.5 g/dL  
 RDW 14.2 11.5 - 14.5 % PLATELET 374 327 - 757 K/uL MPV 10.2 8.9 - 12.9 FL  
 NRBC 0.0 0  WBC ABSOLUTE NRBC 0.00 0.00 - 0.01 K/uL NEUTROPHILS 65 32 - 75 % LYMPHOCYTES 19 12 - 49 % MONOCYTES 7 5 - 13 % EOSINOPHILS 8 (H) 0 - 7 % BASOPHILS 0 0 - 1 % IMMATURE GRANULOCYTES 1 (H) 0.0 - 0.5 % ABS. NEUTROPHILS 7.7 1.8 - 8.0 K/UL  
 ABS. LYMPHOCYTES 2.2 0.8 - 3.5 K/UL  
 ABS. MONOCYTES 0.8 0.0 - 1.0 K/UL  
 ABS. EOSINOPHILS 0.9 (H) 0.0 - 0.4 K/UL  
 ABS. BASOPHILS 0.0 0.0 - 0.1 K/UL  
 ABS. IMM. GRANS. 0.1 (H) 0.00 - 0.04 K/UL  
 DF AUTOMATED Microbiology Data: 
  
  Blood: 10/6/19 
    
 Component Value Ref Range & Units Status Special Requests: NO SPECIAL REQUESTS    Final  
Culture result: NO GROWTH 5 DAYS    Final  
Result History 10/9/19 joint fluid  
    
Component Value Ref Range & Units Status Special Requests: NO SPECIAL REQUESTS    Preliminary GRAM STAIN OCCASIONAL WBCS SEEN    Preliminary GRAM STAIN NO ORGANISMS SEEN    Preliminary Culture result:    Preliminary Culture performed on Fluid swab specimen Culture result: Abnormal     Preliminary RARE STAPHYLOCOCCUS EPIDERMIDIS Susceptibility Staphylococcus epidermidis JUAREZ (Preliminary) Ampicillin/sulbactam ($) <=8/4 ug/mL S Cefazolin ($) <=4 ug/mL S Ciprofloxacin ($) <=1 ug/mL S Clindamycin ($) <=0.5 ug/mL S Daptomycin ($$$$$) <=0.5 ug/mL S Erythromycin ($$$$) <=0.5 ug/mL S Gentamicin ($) <=4 ug/mL S Levofloxacin ($) <=1 ug/mL S Linezolid ($$$$$) <=1 ug/mL S Oxacillin <=0.25 ug/mL S Rifampin ($$$$) <=1 ug/mL S1 Tetracycline <=4 ug/mL S Trimeth/Sulfa <=0.5/9.5 u... S Vancomycin ($) 1 ug/mL S   
     
1 Rifampin is not to be used for mono-therapy. Operative cultures 10/12/19 Hip L Specimen Information: Hip  
    
Component Value Ref Range & Units Status Special Requests: BODY FLUID   Preliminary GRAM STAIN OCCASIONAL WBCS SEEN    Preliminary GRAM STAIN NO ORGANISMS SEEN    Preliminary Culture result:    Preliminary Culture performed on Fluid swab specimen Culture result:    Preliminary No growth thus far, holding 14 days.   
 
    
Component Value Ref Range & Units Status Special Requests: BODY FLUID   Preliminary Culture result:    Preliminary No growth thus far, holding 14 days. Result History Component Value Ref Range & Units Status Special Requests: BODY FLUID   Preliminary Culture result: NO FUNGUS ISOLATED 1 DAY    Preliminary Result History Pathology Results: 
 
Imaging: TTE 
 10/7/19 · Left Ventricle: Normal cavity size. Mild concentric hypertrophy. Mild-to-moderate systolic dysfunction. Estimated left ventricular ejection fraction is 41 - 45%. No regional wall motion abnormality noted. Moderate (grade 2) left ventricular diastolic dysfunction. · Left Atrium: Moderately dilated left atrium. · Right Atrium: Moderately dilated right atrium. · Interatrial Septum: Color flow Doppler was used. · Aortic Valve: Prosthetic aortic valve. Aortic valve mean gradient is 26 mmHg. 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 Prosthesis is normal. Prosthetic valve function is insufficient. There is mild paravalvular leaking. Mild to moderate aortic valve regurgitation is present. · Mitral Valve: Moderate mitral annular calcification. Mild mitral valve regurgitation is present. · Tricuspid Valve: Mild tricuspid valve regurgitation is present. · Pulmonary Artery: There is no evidence of pulmonary hypertension. · IVC/Hepatic Veins: Severely elevated central venous pressure (15+ mmHg); IVC diameter is larger than 21 mm and collapses less than 50% with respiration. Echo Findings Left Ventricle Normal cavity size. Mild concentric hypertrophy. The muscle mass is normal. The cavity shape is normal. Mild-to-moderate systolic dysfunction. The estimated ejection fraction is 41 - 45%. No regional wall motion abnormality noted. End-systolic volume is normal. There is moderate (grade 2) left ventricular diastolic dysfunction. Normal left ventricular diastolic pressure. End-diastolic volume is normal.  
Wall Scoring The following segments are akinetic: apical inferior. All other segments are normal.  
  
  
  
Left Atrium Moderately dilated left atrium. No atrial septal defect present. Right Ventricle Normal cavity size, wall thickness and global systolic function. Right Atrium Moderately dilated right atrium. Interatrial Septum Color flow Doppler was used.  No atrial septal defect present. No interatrial septal aneurysm present. Centre Hind Aortic Valve Normal valve structure, trileaflet valve structure and no stenosis. Prosthetic aortic valve. Normal aortic leaflet mobility. Aortic valve mean gradient is 26 mmHg. Mild to moderate aortic valve regurgitation. There is a bioprosthetic valve present. Mild paravalvular, prosthetic valve insufficiency present. The prosthetic valve is normal.  
Mitral Valve No stenosis. Leaflet calcification. There is moderate anterior leaflet calcification diffusely. There is moderate posterior leaflet calcification diffusely. Normal mitral valve leaflet separation. Moderate mitral annular calcification. Mild regurgitation. Tricuspid Valve Normal valve structure and no stenosis. Mild tricuspid valve regurgitation. There is no evidence of pulmonary hypertension. Pulmonic Valve Pulmonic valve not well visualized. Normal valve structure, no stenosis and no regurgitation. Aorta Normal aortic root, ascending aortic, and aortic arch. IVC/Hepatic Veins Severely elevated central venous pressure (15+ mmHg); IVC diameter is larger than 21 mm and collapses less than 50% with respiration. Pericardium Normal pericardium and no evidence of pericardial effusion. CT 10/10/19 
  
FINDINGS: Patient is post left hip hemiarthroplasty. There is no evidence of 
periprosthetic fracture. No malalignment. 
  
There is edema in the soft tissues laterally. There is an ill-defined 3.8 x 3.7 
x 7.7 cm low density collection lateral to the left greater trochanter. There is 
diffuse edema in the subcutaneous tissues 
  
Prostate is surgically absent. Extensive vascular calcifications. Intrapelvic 
bowel is nondistended. Probable right-sided hydrocele. 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 
bone formation.  
  
IMPRESSION IMPRESSION: 
1. Postoperative collection lateral to the left hip 2. No evidence of periprosthetic fracture 3. Early heterotopic bone formation 
  
CT chest 10/6/19 FINDINGS: 
  
 THYROID: Unremarkable. MEDIASTINUM/KIA: No mass or lymphadenopathy. HEART/PERICARDIUM: Mildly enlarged. Extensive coronary atherosclerosis. Prior CABG. LUNGS/PLEURA: Emphysema. Focal areas of atelectasis, bronchiectasis and 
calcification in the left lower lobe and to lesser degree inferior lingula. Small bilateral pleural effusions, portion of which on the left extends along 
the major fissure. Minimal right lower lobe calcification and 
bronchiectasis/atelectasis. No pulmonary edema or pneumothorax. INCIDENTALLY IMAGED UPPER ABDOMEN: Probable left renal cysts, partly visualized. Prior cholecystectomy. BONES: No destructive bone lesion. ADDITIONAL COMMENTS:  N/A. 
  
IMPRESSION IMPRESSION: 
  
1. Small bilateral pleural effusions with areas of bilateral lower lobe and 
lingular atelectasis and bronchiectasis as above. No pulmonary edema or 
pneumothorax. 2. Cardiomegaly with coronary atherosclerosis and prior CABG. X ray 10/12/19 IMPRESSION IMPRESSION: There is a left hip prosthesis in gross anatomic alignment without 
evidence for orthopedic hardware complication. US  10/14/19 
  
FINDINGS:  The exam demonstrates normal appearance to urinary bladder with 
bilateral ureteral jets noted. Bladder volume is measured at 766 mL based on 
dimensions of 14.3 x 10.2 x 10.0 cm. No mass or pelvic free fluid is seen. 
  
IMPRESSION IMPRESSION: Volume 766 mL with otherwise unremarkable appearance of bladder and 
bilateral ureteral jets seen. Procedures: 
10/12/19 Resection of arthroplasty, left hip, with placement of antibiotic spacer. 9/4/19 L hip hemiarthroplasty Assessment / Plan:  
 
Mr Tri Mcdonough is a 80year old gentleman wt hx of prosthetic AV, femur fracture S/P L hip hemiarthroplasty Sep 2019, CAD wt CABG, admitted on 10/6/19 with fever, and weakness.   
From review of records, there were concerns of pneumonia and he has been on Levaquin. No cough noted per nursing. Patient denied cough as well. Has issues with aspiration and swallowing per discussion with daughter on phone today From chart review: He had it aspirated per notes on 10/6/19. No fluid counts noted but cultures now resulted as MSSE. He received a dose of Vancomycin IV initially on admission 10/6/19  and also Zosyn 10/6-10/10/19. He has since been on Levaquin from 10/6/19. He received Cefazolin perioperatively He had resection of arthroplasty, left hip, with placement of antibiotic spacer WBC on admission 11.8 and he ad a fever of 101.1 on admission. From 10/12/19  operative report, \" There was active purulent drainage. There was a 2-cm defect in the IT band and this drainage continued deep to surround the implant, which was cloudy\". Cultures pending for today's surgery. 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 Will plan for 6 week duration till 11/23/19 based on tolerance. Will need to discuss suppression after that depending course, goals of care given hardware in place Check weekly CBC wt diff, CMP, ESR and CRP If febrile , repeat blood cultures F/U with me on 11/5/19 at 10 am. Office address is [address] . Ph [phone]. Fax  PICC line for IV Antibiotics per primary team and removal once IV therapy completed Side effects of antibiotics including GI, risk for CDI, renal, hematological discussed Synergy with rifampin not chosen given age, risk for adverse effects and drug drug interactions Yogurt/Probiotic encouraged 2) Prosthetic AV If febrile check blood cx If bacteremia, may need MICHELLE based on goals of care 3)  B/L effusion, risk for aspiration Doubt he has active pneumonia DC Levaquin ( S/P  5 days already ) Recommend using incentive spirometry 4) YAYA: per primary team  
Renally dose antibiotics Improving Cr 5) CAD, CABG,  
 
6) Cognitive issues per daughter since 9/2019 Per primary team  
 
7) ? Delirum : per primary team  
 
 
D/W with nursing staff today Thank for the opportunity to participate in the care of this patient. Please contact with questions or concerns.   
 
 
 
Skyler Keane DO 
3:46 PM

## 2019-10-15 NOTE — PROGRESS NOTES
ORTHO PROGRESS NOTE SUBJECTIVE: 
Ju Sanchez thinks his hip pain is improved. OBJECTIVE: 
Patient Vitals for the past 24 hrs: 
 Temp Pulse BP  
10/15/19 0807 98.1 °F (36.7 °C) 68 177/74  
10/15/19 0333 98.2 °F (36.8 °C) 72 192/84  
10/14/19 2052 98.7 °F (37.1 °C) 65 169/58  
10/14/19 1455 97.9 °F (36.6 °C) (!) 55 166/65 Awake, no distress. Sitting in chair. No family present. He recalls hip surgeries and that he is in hospital but can't state month or name of hospital. 
Respirations unlabored. Some unproductive cough. No pain with PROM left hip. Thigh soft. Aquacel intact with some contained bleeding. SILT bilat foot. Calves non-tender. Recent Labs 10/15/19 
9641 HGB 8.9* HCT 27.9*  
 BUN 23* CREA 1.34* GFRAA >60  
GFRNA 50* PT/OT:  
Gait:  Gait Speed/Milka: Pace decreased (<100 feet/min) Step Length: Left shortened, Right shortened Stance: Left decreased Gait Abnormalities: Decreased step clearance(knee buckling, decrease position within walker) Ambulation - Level of Assistance: Minimal assistance, Moderate assistance Distance (ft): 80 Feet (ft) Assistive Device: Gait belt, Walker, rolling ASSESSMENT: 
Procedure: Procedure(s): 
I&D LEFT HIP AND RESECTION ARTHROPLASTY Post Op day: 3 Days Post-Op PLAN: 
Abx per ID. PT/OT: WBAT. Analgesics: Tylenol DVT proph: ASA 81 BID Disp planning. [facility]

## 2019-10-15 NOTE — PROGRESS NOTES
Occupational Therapy: defer Chart reviewed, consulted with RN, attempted OT treatment session. Patient is receiving PICC at bedside and is unavailable at this time. Will defer and will f/u as able and appropriate.  
 
Charlee Kemp, OTR/L

## 2019-10-15 NOTE — PROGRESS NOTES
Resting comfortably. GEN:  NAD. AOx3 ABD:  S/NT/ND  
LLE:  Dressing C/D/I , 5/5 motor, Calf nttp (Bilat), Sensation grossly intact to light touch throughout, 1+ dp/pt pulses, foot perfused Patient Vitals for the past 24 hrs: 
 Temp Pulse Resp BP SpO2  
10/15/19 0333 98.2 °F (36.8 °C) 72 18 192/84 96 % 10/14/19 2052 98.7 °F (37.1 °C) 65 18 169/58 94 % 10/14/19 1455 97.9 °F (36.6 °C) (!) 55 17 166/65 96 % 10/14/19 0957 98.4 °F (36.9 °C) 65 18 173/75 94 % Current Facility-Administered Medications Medication Dose Route Frequency  traMADol (ULTRAM) tablet 50 mg  50 mg Oral Q6H PRN  
 acetaminophen (TYLENOL) tablet 1,000 mg  1,000 mg Oral Q8H  
 haloperidol lactate (HALDOL) injection 2 mg  2 mg IntraVENous Q6H PRN  
 sodium chloride (NS) flush 5-40 mL  5-40 mL IntraVENous Q8H  
 sodium chloride (NS) flush 5-40 mL  5-40 mL IntraVENous PRN  
 hydrOXYzine HCl (ATARAX) tablet 10 mg  10 mg Oral Q8H PRN  
 naloxone (NARCAN) injection 0.4 mg  0.4 mg IntraVENous PRN  
 senna-docusate (PERICOLACE) 8.6-50 mg per tablet 1 Tab  1 Tab Oral BID  polyethylene glycol (MIRALAX) packet 17 g  17 g Oral DAILY  bisacodyl (DULCOLAX) suppository 10 mg  10 mg Rectal DAILY PRN  
 aspirin delayed-release tablet 81 mg  81 mg Oral BID  
 0.9% sodium chloride infusion 250 mL  250 mL IntraVENous PRN  
 ceFAZolin (ANCEF) 1 g in 0.9% sodium chloride (MBP/ADV) 50 mL  1 g IntraVENous Q12H  
 QUEtiapine (SEROquel) tablet 25 mg  25 mg Oral QHS  balsam peru-castor oil (VENELEX) ointment   Topical BID  [Held by provider] furosemide (LASIX) injection 20 mg  20 mg IntraVENous DAILY  hydrALAZINE (APRESOLINE) 20 mg/mL injection 10 mg  10 mg IntraVENous Q6H PRN  
 calcium-vitamin D (OS-OLEKSANDR) 500 mg-200 unit tablet  1 Tab Oral TID WITH MEALS  docusate sodium (COLACE) capsule 100 mg  100 mg Oral BID PRN  
 metoprolol tartrate (LOPRESSOR) tablet 50 mg  50 mg Oral BID Lab Results Component Value Date/Time HGB 8.9 (L) 10/15/2019 03:37 AM  
 INR 1.2 (H) 09/03/2019 01:21 PM  
 
 
Lab Results Component Value Date/Time Sodium 142 10/15/2019 03:37 AM  
 Potassium 3.9 10/15/2019 03:37 AM  
 Chloride 109 (H) 10/15/2019 03:37 AM  
 CO2 30 10/15/2019 03:37 AM  
 BUN 23 (H) 10/15/2019 03:37 AM  
 Creatinine 1.34 (H) 10/15/2019 03:37 AM  
 Calcium 9.4 10/15/2019 03:37 AM  
 Magnesium 2.0 10/15/2019 03:37 AM  
 Phosphorus 1.9 (L) 10/15/2019 03:37 AM  
 
 
 
  
80 y.o. male s/p resection arthroplasty left hip on 10/12/2019  . Doing well. Ancef via PICC Precautions: Posterior ABX: ID following PATHWAY: Straight cath per protocol DVT Prophylaxis: ASA 81 mg enteric coated BID Weight Bearing: WBAT LLE Pain Control: PO 
Disposition: Likely rehab

## 2019-10-16 NOTE — PROGRESS NOTES
Problem: Mobility Impaired (Adult and Pediatric) Goal: *Acute Goals and Plan of Care (Insert Text) Description FUNCTIONAL STATUS PRIOR TO ADMISSION: Baseline unknown-suspect assist for ADLs and Jase-supervision for ambulation with RW (?). 
**Update-patient lived alone until fracture and then in rehab. Went home with daughter to assist but unable to manage with admission for infection. HOME SUPPORT PRIOR TO ADMISSION: Pt lives with his DTR per RN. **Update-daughter was staying with patient after discharge from rehab but lives in [place] and unable to stay long term with patient Physical Therapy Goals Initiated 10/14/2019 1. Patient will move from supine to sit and sit to supine  and roll side to side in bed with supervision/set-up within 4 days. 2. Patient will perform sit to stand with supervision/set-up within 4 days. 3. Patient will ambulate with supervision/set-up for 150 feet with the least restrictive device within 4 days. 4. Patient will ascend/descend 7 stairs with 1 handrail(s) with minimal assistance/contact guard assist within 4 days. 5. Patient will perform  home exercise program per protocol with minimal assistance/contact guard assist within 4 days. Initiated 10/7/2019 1. Patient will move from supine to sit and sit to supine  in bed with minimal assistance/contact guard assist within 7 day(s). 2.  Patient will transfer from bed to chair and chair to bed with minimal assistance/contact guard assist using the least restrictive device within 7 day(s). 3.  Patient will perform sit to stand with minimal assistance/contact guard assist within 7 day(s). 4.  Patient will ambulate with supervision/set-up for 50 feet with the least restrictive device within 7 day(s). 5.  PT to assess stairs as appropriate. Outcome: Progressing Towards Goal 
 PHYSICAL THERAPY TREATMENT Patient: Ju Sanchez (50 y.o. male) Date: 10/16/2019 Diagnosis: Pneumonia [J18.9] Pneumonia Procedure(s) (LRB): 
I&D LEFT HIP AND RESECTION ARTHROPLASTY (Left) 4 Days Post-Op Precautions: Fall, Bed Alarm(dementia) Chart, physical therapy assessment, plan of care and goals were reviewed. ASSESSMENT Patient continues with skilled PT services and is progressing towards goals. Patient received in chair having been up since breakfast.  Both daughters present. Patient without complaint of pain. Able to ambulate with RW with minimal assist but increased assist as he fatigues. Still with flexed knees during gait and at increased risk for falling. Patient requiring repetition of commands to follow through for bed mobility and positioning. Overall do not feel he could manage the steps to get into his home or up to his bedroom. Will attempt to assess steps tomorrow. Overall he continues to be extremely weak and poor endurance for activity. Needs 24/7  supervision for safety with dementia. Daughters expressing desire to take patient to [place] where they live and requesting to talk with appropriate staff on how to best manage this with IV antibiotics and transport. Current Level of Function Impacting Discharge (mobility/balance): minimal to mod assist for transfers/gait. Other factors to consider for discharge: lived alone; daughters live in [place]; was in rehab and then home where daughter injured back while assisting patient on steps and to bed. PLAN : 
Patient continues to benefit from skilled intervention to address the above impairments. Continue treatment per established plan of care. to address goals. Recommendation for discharge: (in order for the patient to meet his/her long term goals) To be determined: could benefit from more rehab or at minimum, intensive home health 5x/week; family hesitant for SNF This discharge recommendation: 
Has been made in collaboration with the attending provider and/or case management Equipment recommendations for successful discharge (if) home: none SUBJECTIVE:  
Patient stated no pain.  OBJECTIVE DATA SUMMARY:  
Critical Behavior: 
Neurologic State: Alert Orientation Level: Oriented to person Cognition: Follows commands Safety/Judgement: Decreased insight into deficits, Decreased awareness of need for safety, Decreased awareness of need for assistance Functional Mobility Training: 
Bed Mobility: 
  
  
Sit to Supine: Moderate assistance Transfers: 
Sit to Stand: Additional time; Moderate assistance Stand to Sit: Minimum assistance Balance: 
Sitting: Intact Standing: Impaired; With support Standing - Static: Fair Standing - Dynamic : Fair;Constant support Ambulation/Gait Training: 
Distance (ft): 60 Feet (ft) Assistive Device: Gait belt;Walker, rolling Ambulation - Level of Assistance: Minimal assistance Gait Abnormalities: Shuffling gait; Decreased step clearance; Antalgic Left Side Weight Bearing: As tolerated Step Length: Left shortened;Right shortened After treatment patient left in no apparent distress:  
Supine in bed, Patient positioned in left sidelying for pressure relief, Call bell within reach, Bed / chair alarm activated and Side rails x 3 
 
COMMUNICATION/COLLABORATION:  
The patients plan of care was discussed with: Registered Nurse Hayden Maria PT Time Calculation: 33 mins

## 2019-10-16 NOTE — PROGRESS NOTES
JYOTI 
-Referral sent to [facility] to assist with home IV Abx-CM received update via [facility] that the patient has been accepted. -Referral sent to [facility] for Home Health/SN PIC line care/Labs-CM received call from [facility] liaison who advised can service in [place], and they are reviewing insurance 
-Patient family returning to [place] with patient at discharge. Patient and family interested in discharging home to leave with Daughters to live in [place]. CM sent referrals to [facility] for Home IV Abx and provided details about traveling to [place] to live permanently. CM received updates stating interested and reviewing insurance. CM also sent referral to [facility] who advised they are also interested and the [place] location is reviewing insurance as well. CM will follow up with family to discuss once all details of placement have been confirmed. The address patient will receive services is [address] [place] [address], [address] [place] [address]. Patient accepted with [facility] for home IV Abx. CM called [facility] liaison to inquire about referral, left message, awaiting a response. CM will follow Orrie Angelucci, MHA/SHARMIN

## 2019-10-16 NOTE — PROGRESS NOTES
Problem: Self Care Deficits Care Plan (Adult) Goal: *Acute Goals and Plan of Care (Insert Text) Description FUNCTIONAL STATUS PRIOR TO ADMISSION: Last week pt was reportedly discharged from SNF to home with daughter assisting (visiting from [place]) and pt was requiring significant physical assistance to stand at night (daughter reportedly with back injury attempting to lift pt at night). HOME SUPPORT: Pt was residing alone in tri-level home (2 daughters reside in [place]). Occupational Therapy Goals Goals reviewed and continued 10/13/2019 following resection of L hip arthroplasty Initiated 10/8/2019 1. Patient will perform upper body dressing with supervision/set-up within 7 day(s). 2.  Patient will perform lower body dressing with minimal assistance/contact guard assist within 7 day(s). 3.  Patient will perform grooming standing sink level with minimal assistance/contact guard assist within 7 day(s). 4.  Patient will perform toilet transfers contact guard assist within 7 day(s). 5.  Patient will perform all aspects of toileting with minimal assistance/contact guard assist within 7 day(s). 6.  Patient will participate in upper extremity therapeutic exercise/activities with supervision/set-up for 10 minutes within 7 day(s). Outcome: Progressing Towards Goal 
 
OCCUPATIONAL THERAPY TREATMENT Patient: Raysa Cisneros (60 y.o. male) Date: 10/16/2019 Diagnosis: Pneumonia [J18.9] Pneumonia Procedure(s) (LRB): 
I&D LEFT HIP AND RESECTION ARTHROPLASTY (Left) 4 Days Post-Op Precautions: Fall, Bed Alarm(dementia) Chart, occupational therapy assessment, plan of care, and goals were reviewed. ASSESSMENT Patient continues with skilled OT services and is progressing towards goals. Pt is pleasant gentleman who engaged well in OT session with cues for re-direction and to engage in task.  Remains close supervision/MIN A for ADL related mobility and performing denture care standing at sink today with MIN A for standing balance. Bed mobility overall MIN A with increased time and verbal and tactile cuing to achieve requested movements. Recommend continued OT to facilitate engagement in [address] Remains limited by generalized weakness, decreased strength/endurance and activity tolerance, and impaired cognition/safety awareness. With concerns for pt returning home alone at this time. Will continue to follow. Current Level of Function Impacting Discharge (ADLs): supervision 24/7, max to min A for ADLs Other factors to consider for discharge: living alone, needs 24/7 supervision 2/2 dementia PLAN : 
Patient continues to benefit from skilled intervention to address the above impairments. Continue treatment per established plan of care. to address goals. Recommend with staff: Recommend with nursing patient to complete as able in order to maintain strength, endurance and independence: ADLs with supervision/setup, OOB to chair 3x/day and mobilizing to the bathroom for toileting with min  assist. Thank you for your assistance. Recommend next OT session: standing ADLs - shaving at sink Recommendation for discharge: (in order for the patient to meet his/her long term goals) Therapy up to 5 days/week in SNF setting This discharge recommendation: 
Has not yet been discussed the attending provider and/or case management Equipment recommendations for successful discharge (if) home: tbd by rehab SUBJECTIVE:  
Patient stated I think he's coming to sell the car tomorrow.  OBJECTIVE DATA SUMMARY:  
Cognitive/Behavioral Status: 
  
 Pt confused at baseline. Not oriented to situation or location. Pleasant and agreeable to work with staff. Mod cues for safety during ADL related moblity. Functional Mobility and Transfers for ADLs: 
Bed Mobility: 
Sit to Supine: Moderate assistance Transfers: 
Sit to Stand: Additional time; minimal assistance Balance: Sitting: Intact Standing: Impaired; With support Standing - Static: Fair Standing - Dynamic : Fair;Constant support ADL Intervention: 
  
 
Grooming Grooming Assistance: Minimum assistance Brushing Teeth: Minimum assistance Pain: 
None reported Activity Tolerance:  
Fair Please refer to the flowsheet for vital signs taken during this treatment. After treatment patient left in no apparent distress:  
Sitting in chair, Call bell within reach and nursing in room at end of session , chair alarm on COMMUNICATION/COLLABORATION:  
The patients plan of care was discussed with: Physical Therapist and Registered Nurse Ellyn Jernigan OT Time Calculation: 18 mins

## 2019-10-16 NOTE — PROGRESS NOTES
Hospitalist Progress Note Amanda Sosa MD 
Answering service: [phone] OR [phone] from in house phone Date of Service:  10/16/2019 NAME:  Kolby Lara :  3/18/1927 MRN:  [id number] Admission Summary:  
Patient is a 80year old Male medical history significant for HTN , CAD,and DDD who presents to the Emergency Department accompanied by Daughter via EMS with chief complaint of fever. Patient complains of subjective fever, decreased oral intake  increased weakness after he was discharged from rehab 3 days. 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 He denies chills , Shortness of breath , chest pain , palpitations , leg swelling , syncope or near syncope , headaches , seizures or change in mentation , urinary or bowel complains. 
   
Interval history / Subjective:  
10/16/2019 : 
No issues today, dispo being developed,   
 
Assessment & Plan:  
 
Sepsis (POA) secondary to L hip prosthetic joint infection - + staph epi - X-ray showed left femoral neck fracture 9/3 
- s/p Left hip hemiarthroplasty 2019 
- CT of the hip with fluid collection. Concern that this may tract deeper. Ortho following for potential surgery - s/p OR 10/12 for hip arthroplasty, antibiotic spacer placement and I and D.  
- Micro growing Staph epi, Methicillin sensitive. - ID consulted, on Cefazolin, with 6 weeks until 19 per Dr. Moreau Sheets.  
 
CT findings of bilateral atelectasis - febrile on admission, however with bilateral very small infiltrates in setting of new LE edema and new onset CHF. Possible that findings on CT are due to CHF.  
- in the setting of fever ,leucocytosis and CXR concerning new bibasilar infiltrates, originally treated for PNA 
- s/p levofloxacin for potential PNA 
- blood culture NGTD  
- DC IVF  
- Cont to monitor  
  
 Acute CHF exacerbation. No known history of CHF. Pulmonary edema. NYHA class 3-4 on admission. H/o known CAD, ?ischemic. 
- Echo with mild to moderate CHF, EF 40-45% - On metoprolol 
- Holding lasix  
- Not on ACE/ARB due to CKD. - Family wishes to follow up with his cardiologist outpatient. - I and O and daily weights. - no s/s of hf 10/15/2019 - 10/16/2019  
  
Bilateral bronchiectasis - possible  2/2 recurrent aspiration pneumonia  
- Not in acute exacerbation (no significant sputum production) - x-ray in 9/3 was unremarkable - No prior CT for comparison 
  
HTN  
- BP at goal  
- Cont home med's  
BP Readings from Last 1 Encounters:  
10/16/19 172/71  
  
  
CAD with valvular replacement 
- Stable - Cont home ASA , BB Statins  
  
Increased Generalized weakness  
- likely from the above  
- PT/OT Metabolic encephalopathy - h/o dementia now with intermittent delirium - Add PM Seroquel  
- PRN IV haldol  
- stable mental 10/16/2019 Code status: FULL CODE.  
DVT prophylaxis: Lovenox PTA: home Baseline: Independent with walker Care Plan discussed with: Patient/Family Disposition: TBD Hospital Problems  Date Reviewed: 10/7/2019 Codes Class Noted POA * (Principal) Pneumonia ICD-10-CM: J18.9 ICD-9-CM: 228  10/6/2019 Yes Review of Systems: A comprehensive review of systems was negative except for that written in the HPI. Vital Signs:  
 Last 24hrs VS reviewed since prior progress note. Most recent are: 
Visit Vitals /71 (BP 1 Location: Left arm, BP Patient Position: Post activity; Sitting) Pulse 61 Temp 97.6 °F (36.4 °C) Resp 18 Ht 5' 10\" (1.778 m) Wt 58.7 kg (129 lb 8 oz) SpO2 97% BMI 18.58 kg/m² Intake/Output Summary (Last 24 hours) at 10/16/2019 1542 Last data filed at 10/16/2019 0710 Gross per 24 hour Intake 227.62 ml Output 450 ml Net -222.38 ml Physical Examination:  
 
 
    Stable exam 10/16/2019 Constitutional:  No acute distress, Sleepy post op ENT:  Oral mucousa dry, oropharynx benign. Resp:  CTA bilaterally. No wheezing/rhonchi/rales. No accessory muscle use CV:  Regular rhythm, normal rate, no murmurs, gallops, rubs GI:  Soft, non distended, non tender. normoactive bowel sounds, no hepatosplenomegaly Musculoskeletal:  No edema, warm, 2+ pulses throughout, L hip with tenderness to palpation, bandage without obvious drainage. Neurologic:  Awake, pleasantly confused. Skin:  Good turgor, no rashes or ulcers Data Review:  
 Review and/or order of clinical lab test 
Review and/or order of tests in the radiology section of CPT Review and/or order of tests in the medicine section of CPT Labs:  
 
Recent Labs 10/15/19 
4817 10/14/19 
2334 WBC 11.8* 10.5 HGB 8.9* 8.5* HCT 27.9* 26.7*  
 235 Recent Labs 10/15/19 
1113 10/14/19 
0911  141  
K 3.9 4.2 * 109* CO2 30 29 BUN 23* 23* CREA 1.34* 1.39* * 108* CA 9.4 8.9 MG 2.0 2.0 PHOS 1.9* 2.0* No results for input(s): SGOT, GPT, ALT, AP, TBIL, TBILI, TP, ALB, GLOB, GGT, AML, LPSE in the last 72 hours. No lab exists for component: AMYP, HLPSE No results for input(s): INR, PTP, APTT, INREXT, INREXT in the last 72 hours. No results for input(s): FE, TIBC, PSAT, FERR in the last 72 hours. No results found for: FOL, RBCF No results for input(s): PH, PCO2, PO2 in the last 72 hours. No results for input(s): CPK, CKNDX, TROIQ in the last 72 hours. No lab exists for component: CPKMB Lab Results Component Value Date/Time Cholesterol, total 127 02/19/2010 11:45 AM  
 HDL Cholesterol 55 02/19/2010 11:45 AM  
 LDL, calculated 63.6 02/19/2010 11:45 AM  
 Triglyceride 42 02/19/2010 11:45 AM  
 CHOL/HDL Ratio 2.3 02/19/2010 11:45 AM  
 
Lab Results Component Value Date/Time  Glucose (POC) 109 (H) 10/06/2019 06:40 PM  
 Glucose (POC) 91 09/04/2019 02:27 PM  
 Glucose (POC) 94 09/03/2019 03:20 PM  
 
Lab Results Component Value Date/Time Color YELLOW/STRAW 10/14/2019 07:49 AM  
 Appearance CLEAR 10/14/2019 07:49 AM  
 Specific gravity 1.012 10/14/2019 07:49 AM  
 pH (UA) 7.0 10/14/2019 07:49 AM  
 Protein TRACE (A) 10/14/2019 07:49 AM  
 Glucose NEGATIVE  10/14/2019 07:49 AM  
 Ketone NEGATIVE  10/14/2019 07:49 AM  
 Bilirubin NEGATIVE  10/14/2019 07:49 AM  
 Urobilinogen 0.2 10/14/2019 07:49 AM  
 Nitrites NEGATIVE  10/14/2019 07:49 AM  
 Leukocyte Esterase NEGATIVE  10/14/2019 07:49 AM  
 Epithelial cells FEW 10/14/2019 07:49 AM  
 Bacteria NEGATIVE  10/14/2019 07:49 AM  
 WBC 0-4 10/14/2019 07:49 AM  
 RBC 0-5 10/14/2019 07:49 AM  
 
 
 
Medications Reviewed:  
 
Current Facility-Administered Medications Medication Dose Route Frequency  traMADol (ULTRAM) tablet 50 mg  50 mg Oral Q6H PRN  
 acetaminophen (TYLENOL) tablet 1,000 mg  1,000 mg Oral Q8H  
 haloperidol lactate (HALDOL) injection 2 mg  2 mg IntraVENous Q6H PRN  
 sodium chloride (NS) flush 5-40 mL  5-40 mL IntraVENous Q8H  
 sodium chloride (NS) flush 5-40 mL  5-40 mL IntraVENous PRN  
 hydrOXYzine HCl (ATARAX) tablet 10 mg  10 mg Oral Q8H PRN  
 naloxone (NARCAN) injection 0.4 mg  0.4 mg IntraVENous PRN  
 senna-docusate (PERICOLACE) 8.6-50 mg per tablet 1 Tab  1 Tab Oral BID  polyethylene glycol (MIRALAX) packet 17 g  17 g Oral DAILY  bisacodyl (DULCOLAX) suppository 10 mg  10 mg Rectal DAILY PRN  
 aspirin delayed-release tablet 81 mg  81 mg Oral BID  
 0.9% sodium chloride infusion 250 mL  250 mL IntraVENous PRN  
 ceFAZolin (ANCEF) 1 g in 0.9% sodium chloride (MBP/ADV) 50 mL  1 g IntraVENous Q12H  
 QUEtiapine (SEROquel) tablet 25 mg  25 mg Oral QHS  balsam peru-castor oil (VENELEX) ointment   Topical BID  [Held by provider] furosemide (LASIX) injection 20 mg  20 mg IntraVENous DAILY  hydrALAZINE (APRESOLINE) 20 mg/mL injection 10 mg  10 mg IntraVENous Q6H PRN  
  calcium-vitamin D (OS-OLEKSANDR) 500 mg-200 unit tablet  1 Tab Oral TID WITH MEALS  docusate sodium (COLACE) capsule 100 mg  100 mg Oral BID PRN  
 metoprolol tartrate (LOPRESSOR) tablet 50 mg  50 mg Oral BID  
 
______________________________________________________________________ EXPECTED LENGTH OF STAY: 8d 21h ACTUAL LENGTH OF STAY:          Adama Pacheco MD

## 2019-10-16 NOTE — PROGRESS NOTES
Infectious Disease Progress Note HPI: 
 
Mr Juan seen earlier today His daughters were at his bedside He denied  pain, fever, chills, cough, nausea, vomiting, diarrhea Was eating when I saw him Out of bed Medications: No current facility-administered medications on file prior to encounter. Current Outpatient Medications on File Prior to Encounter Medication Sig Dispense Refill  docusate sodium (COLACE) 100 mg capsule Take 1 Cap by mouth two (2) times daily as needed for Constipation for up to 90 days. 60 Cap 0  
 calcium-vitamin D (OYSTER SHELL) 500 mg(1,250mg) -200 unit per tablet Take 1 Tab by mouth three (3) times daily (with meals). 90 Tab 0  
 metoprolol tartrate (LOPRESSOR) 50 mg tablet Take 1 Tab by mouth two (2) times a day. 60 Tab 0  
 acetaminophen (TYLENOL) 500 mg tablet Take 1 Tab by mouth every six (6) hours. 60 Tab 0  
 aspirin delayed-release 325 mg tablet Take 325 mg by mouth daily. Current Facility-Administered Medications:  
  traMADol (ULTRAM) tablet 50 mg, 50 mg, Oral, Q6H PRN, Allie Verde NP, 50 mg at 10/13/19 9842   acetaminophen (TYLENOL) tablet 1,000 mg, 1,000 mg, Oral, Q8H, Shira Verdin MD, 1,000 mg at 10/16/19 1433 
  haloperidol lactate (HALDOL) injection 2 mg, 2 mg, IntraVENous, Q6H PRN, Jl Verdin MD 
  sodium chloride (NS) flush 5-40 mL, 5-40 mL, IntraVENous, Q8H, Billy Guevara MD, 10 mL at 10/16/19 1433   sodium chloride (NS) flush 5-40 mL, 5-40 mL, IntraVENous, PRN, Billy Guevara MD, 10 mL at 10/15/19 0407   hydrOXYzine HCl (ATARAX) tablet 10 mg, 10 mg, Oral, Q8H PRN, Esther Guevara MD 
  naloxone (NARCAN) injection 0.4 mg, 0.4 mg, IntraVENous, PRN, Esther Guevara MD 
  senna-docusate (PERICOLACE) 8.6-50 mg per tablet 1 Tab, 1 Tab, Oral, BID, Jose Cruz Joseph MD, 1 Tab at 10/16/19 1019   polyethylene glycol (MIRALAX) packet 17 g, 17 g, Oral, DAILY, [place], Janet Corcoran MD, 17 g at 10/16/19 1019 
  bisacodyl (DULCOLAX) suppository 10 mg, 10 mg, Rectal, DAILY PRN, Janet Guevara MD 
  aspirin delayed-release tablet 81 mg, 81 mg, Oral, BID, Billy Guevara MD, 81 mg at 10/16/19 1019 
  0.9% sodium chloride infusion 250 mL, 250 mL, IntraVENous, PRN, Janet Guevara MD 
  ceFAZolin (ANCEF) 1 g in 0.9% sodium chloride (MBP/ADV) 50 mL, 1 g, IntraVENous, Q12H, Lyssa Sanchez DO, Last Rate: 100 mL/hr at 10/16/19 0338, 1 g at 10/16/19 1592   QUEtiapine (SEROquel) tablet 25 mg, 25 mg, Oral, QHS, Billy Guevara MD, 25 mg at 10/15/19 2130 
  balsam peru-castor oil (VENELEX) ointment, , Topical, BID, Janet Guevara MD 
  [Held by provider] furosemide (LASIX) injection 20 mg, 20 mg, IntraVENous, DAILY, Brain BaldingTyrone MD, 20 mg at 10/11/19 1107   hydrALAZINE (APRESOLINE) 20 mg/mL injection 10 mg, 10 mg, IntraVENous, Q6H PRN, Billy Guevara MD, 10 mg at 10/08/19 0423   calcium-vitamin D (OS-OLEKSANDR) 500 mg-200 unit tablet, 1 Tab, Oral, TID WITH MEALS, Janet Guevara MD, 1 Tab at 10/16/19 1435   docusate sodium (COLACE) capsule 100 mg, 100 mg, Oral, BID PRN, Janet Guevara MD 
  metoprolol tartrate (LOPRESSOR) tablet 50 mg, 50 mg, Oral, BID, Billy Guevara MD, 50 mg at 10/16/19 1019 Physical Exam: 
 
Vitals:  
Patient Vitals for the past 24 hrs: 
 Temp Pulse Resp BP SpO2  
10/16/19 1453 97.6 °F (36.4 °C) 61 18 172/71 97 % 10/16/19 0917 97.6 °F (36.4 °C) 69 19 128/55 96 % 10/16/19 0319 98.3 °F (36.8 °C) (!) 53 18 167/64 94 % 10/15/19 2112 97.9 °F (36.6 °C) 80 18 189/81 94 % 10/15/19 1811 100.2 °F (37.9 °C)     · GEN: NAD 
· HEENT: no scleral icterus,   no thrush · CV: S1, S2 heard regularly, JACQUELIN heard · Lungs: trace crackles heard, no wheezing · Abdomen: soft, non distended, non tender, · Extremities: no edema · Neuro: Alert to self, followed commands, verbal  
· Skin: no rash · MSK: + L hip dressing , no erythema seen around dressing Labs:  
No results found for this or any previous visit (from the past 24 hour(s)). Microbiology Data: 
  
  Blood: 10/6/19 
    
Component Value Ref Range & Units Status Special Requests: NO SPECIAL REQUESTS    Final  
Culture result: NO GROWTH 5 DAYS    Final  
Result History 10/9/19 joint fluid  
    
Component Value Ref Range & Units Status Special Requests: NO SPECIAL REQUESTS    Preliminary GRAM STAIN OCCASIONAL WBCS SEEN    Preliminary GRAM STAIN NO ORGANISMS SEEN    Preliminary Culture result:    Preliminary Culture performed on Fluid swab specimen Culture result: Abnormal     Preliminary RARE STAPHYLOCOCCUS EPIDERMIDIS Susceptibility Staphylococcus epidermidis JUAREZ (Preliminary) Ampicillin/sulbactam ($) <=8/4 ug/mL S Cefazolin ($) <=4 ug/mL S Ciprofloxacin ($) <=1 ug/mL S Clindamycin ($) <=0.5 ug/mL S Daptomycin ($$$$$) <=0.5 ug/mL S Erythromycin ($$$$) <=0.5 ug/mL S Gentamicin ($) <=4 ug/mL S Levofloxacin ($) <=1 ug/mL S Linezolid ($$$$$) <=1 ug/mL S Oxacillin <=0.25 ug/mL S Rifampin ($$$$) <=1 ug/mL S1 Tetracycline <=4 ug/mL S Trimeth/Sulfa <=0.5/9.5 u... S Vancomycin ($) 1 ug/mL S   
     
1 Rifampin is not to be used for mono-therapy. Operative cultures 10/12/19 Hip L Specimen Information: Hip  
    
Component Value Ref Range & Units Status Special Requests: BODY FLUID   Preliminary GRAM STAIN OCCASIONAL WBCS SEEN    Preliminary GRAM STAIN NO ORGANISMS SEEN    Preliminary Culture result:    Preliminary Culture performed on Fluid swab specimen Culture result:    Preliminary No growth thus far, holding 14 days.   
 
    
Component Value Ref Range & Units Status Special Requests: BODY FLUID   Preliminary Culture result:    Preliminary No growth thus far, holding 14 days. Result History Component Value Ref Range & Units Status Special Requests: BODY FLUID   Preliminary Culture result: NO FUNGUS ISOLATED 1 DAY    Preliminary Result History Pathology Results: 
 
Imaging: TTE 
 10/7/19 · Left Ventricle: Normal cavity size. Mild concentric hypertrophy. Mild-to-moderate systolic dysfunction. Estimated left ventricular ejection fraction is 41 - 45%. No regional wall motion abnormality noted. Moderate (grade 2) left ventricular diastolic dysfunction. · Left Atrium: Moderately dilated left atrium. · Right Atrium: Moderately dilated right atrium. · Interatrial Septum: Color flow Doppler was used. · Aortic Valve: Prosthetic aortic valve. Aortic valve mean gradient is 26 mmHg. 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 Prosthesis is normal. Prosthetic valve function is insufficient. There is mild paravalvular leaking. Mild to moderate aortic valve regurgitation is present. · Mitral Valve: Moderate mitral annular calcification. Mild mitral valve regurgitation is present. · Tricuspid Valve: Mild tricuspid valve regurgitation is present. · Pulmonary Artery: There is no evidence of pulmonary hypertension. · IVC/Hepatic Veins: Severely elevated central venous pressure (15+ mmHg); IVC diameter is larger than 21 mm and collapses less than 50% with respiration. Echo Findings Left Ventricle Normal cavity size. Mild concentric hypertrophy. The muscle mass is normal. The cavity shape is normal. Mild-to-moderate systolic dysfunction. The estimated ejection fraction is 41 - 45%. No regional wall motion abnormality noted. End-systolic volume is normal. There is moderate (grade 2) left ventricular diastolic dysfunction. Normal left ventricular diastolic pressure. End-diastolic volume is normal.  
Wall Scoring The following segments are akinetic: apical inferior. All other segments are normal.  
  
  
  
Left Atrium Moderately dilated left atrium. No atrial septal defect present. Right Ventricle Normal cavity size, wall thickness and global systolic function. Right Atrium Moderately dilated right atrium. Interatrial Septum Color flow Doppler was used. No atrial septal defect present. No interatrial septal aneurysm present. Ema Nice Aortic Valve Normal valve structure, trileaflet valve structure and no stenosis. Prosthetic aortic valve. Normal aortic leaflet mobility. Aortic valve mean gradient is 26 mmHg. Mild to moderate aortic valve regurgitation. There is a bioprosthetic valve present. Mild paravalvular, prosthetic valve insufficiency present. The prosthetic valve is normal.  
Mitral Valve No stenosis. Leaflet calcification. There is moderate anterior leaflet calcification diffusely. There is moderate posterior leaflet calcification diffusely. Normal mitral valve leaflet separation. Moderate mitral annular calcification. Mild regurgitation. Tricuspid Valve Normal valve structure and no stenosis. Mild tricuspid valve regurgitation. There is no evidence of pulmonary hypertension. Pulmonic Valve Pulmonic valve not well visualized. Normal valve structure, no stenosis and no regurgitation. Aorta Normal aortic root, ascending aortic, and aortic arch. IVC/Hepatic Veins Severely elevated central venous pressure (15+ mmHg); IVC diameter is larger than 21 mm and collapses less than 50% with respiration. Pericardium Normal pericardium and no evidence of pericardial effusion. CT 10/10/19 
  
FINDINGS: Patient is post left hip hemiarthroplasty. There is no evidence of 
periprosthetic fracture. No malalignment. 
  
There is edema in the soft tissues laterally. There is an ill-defined 3.8 x 3.7 
x 7.7 cm low density collection lateral to the left greater trochanter. There is 
diffuse edema in the subcutaneous tissues 
  
Prostate is surgically absent. Extensive vascular calcifications. Intrapelvic 
bowel is nondistended. Probable right-sided hydrocele. Here is early heterotopic bone formation.  
  
IMPRESSION IMPRESSION: 
1. Postoperative collection lateral to the left hip 2. No evidence of periprosthetic fracture 3. Early heterotopic bone formation 
  
CT chest 10/6/19 FINDINGS: 
  
THYROID: Unremarkable. MEDIASTINUM/KIA: No mass or lymphadenopathy. HEART/PERICARDIUM: Mildly enlarged. Extensive coronary atherosclerosis. Prior CABG. LUNGS/PLEURA: Emphysema. Focal areas of atelectasis, bronchiectasis and 
calcification in the left lower lobe and to lesser degree inferior lingula. Small bilateral pleural effusions, portion of which on the left extends along 
the major fissure. Minimal right lower lobe calcification and 
bronchiectasis/atelectasis. No pulmonary edema or pneumothorax. INCIDENTALLY IMAGED UPPER ABDOMEN: Probable left renal cysts, partly visualized. Prior cholecystectomy. BONES: No destructive bone lesion. ADDITIONAL COMMENTS:  N/A. 
  
IMPRESSION IMPRESSION: 
  
1. Small bilateral pleural effusions with areas of bilateral lower lobe and 
lingular atelectasis and bronchiectasis as above. No pulmonary edema or 
pneumothorax. 2. Cardiomegaly with coronary atherosclerosis and prior CABG. X ray 10/12/19 IMPRESSION IMPRESSION: There is a left hip prosthesis in gross anatomic alignment without 
evidence for orthopedic hardware complication. US  10/14/19 
  
FINDINGS:  The exam demonstrates normal appearance to urinary bladder with 
bilateral ureteral jets noted. Bladder volume is measured at 766 mL based on 
dimensions of 14.3 x 10.2 x 10.0 cm. No mass or pelvic free fluid is seen. 
  
IMPRESSION IMPRESSION: Volume 766 mL with otherwise unremarkable appearance of bladder and 
bilateral ureteral jets seen. Procedures: 
10/12/19 Resection of arthroplasty, left hip, with placement of antibiotic spacer. 9/4/19 L hip hemiarthroplasty Assessment / Plan:  
 
Mr Rosa Isela Segal is a 80year old gentleman wt hx of prosthetic AV, femur fracture S/P L hip hemiarthroplasty Sep 2019, CAD wt CABG, admitted on 10/6/19 with fever, and weakness. From review of records, there were concerns of pneumonia and he has been on Levaquin. No cough noted per nursing. Patient denied cough as well. Has issues with aspiration and swallowing per discussion with daughter on phone today From chart review: He had it aspirated per notes on 10/6/19. No fluid counts noted but cultures now resulted as MSSE. He received a dose of Vancomycin IV initially on admission 10/6/19  and also Zosyn 10/6-10/10/19. He has since been on Levaquin from 10/6/19. He received Cefazolin perioperatively He had resection of arthroplasty, left hip, with placement of antibiotic spacer WBC on admission 11.8 and he ad a fever of 101.1 on admission. From 10/12/19  operative report, \" There was active purulent drainage. There was a 2-cm defect in the IT band and this drainage continued deep to surround the implant, which was cloudy\". Cultures pending for today's surgery. 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 Will plan for 6 week duration till 11/23/19 based on tolerance. Will need to discuss suppression after that depending course, goals of care given hardware in place Check weekly CBC wt diff, CMP, ESR and CRP. Please fax these results to me at [phone] If febrile , repeat blood cultures F/U with me on 11/5/19 at 10 am. Office address is [address] . Ph [phone]. Fax  PICC line for IV Antibiotics per primary team and removal once IV therapy completed Side effects of antibiotics including GI, risk for CDI, renal, hematological discussed Synergy with rifampin not chosen given age, risk for adverse effects and drug drug interactions Yogurt/Probiotic encouraged 2) Prosthetic AV If febrile check blood cx If bacteremia, may need MICHELLE based on goals of care 3)  B/L effusion, risk for aspiration Doubt he has active pneumonia DC Levaquin ( S/P  5 days already ) Recommend using incentive spirometry 4) YAYA: per primary team  
Renally dose antibiotics Improving Cr 5) CAD, CABG,  
 
6) Cognitive issues per daughter since 9/2019 Per primary team  
 
7) ? Delirum : per primary team  
 
 
I will sign off at this time. Dr Yenifer Goodrich is covering 10/17-10/18/19 if any acute issues arise. Thank for the opportunity to participate in the care of this patient. Please contact with questions or concerns.   
 
 
 
Ciara Humphries, DO 
3:53 PM

## 2019-10-16 NOTE — WOUND CARE
WOCN Note:  
  
Follow up for: pink blanchable bilateral heels and sacrum. 
  
Chart shows: 
Admitted for pneumonia, dementia, fever, elevated B/P and new bilateral small effusions. PMH: fractured left femur, closed, HTN, ground level fall, left hip replaced 9-4-19, CAD, DDD, sciatica. WBC = 7.9 On = 10-9-19 Admitted from home. 
  
Assessment:  
Patient is alert but not oriented ( on bed alarm) sitting up in the chair, communicative, incontinent and mobile ( but very weak). Needs assistance in repositioning and lifting up in the bed . Stood with walker and 1 assist to assess bottom. Patient wearing briefs for incontinence. Bed: [facility] Care Bed in lowest position / secondary to falls and dementia. Diet: cardiac regular. Patient reports no pain relaxing in chair watching TV. 
  
-Bilateral heels are pink and blanching slowly. -Buttocks pink related to incontinence/ wearing brief / no opened areas. 
-Sacral area is pink: with noted old healed pink scar / sacral area blanching slowly. 
  
Recommendations:  10-16-19: continue current treatment. 
- vasolex ointment twice daily to heels and sacrum. - Hydraguard cream for incontinence and buttock irritation twice daily and as needed. 
  
Minimize layers of linen/pads under patient to optimize support surface. Turn/reposition approximately every 2 hours and offload heels. Manage incontinence / promote continence; Remedy Hydaraguard cream ( blue tube)  to buttocks and  daily and as needed with incontinence care. Specialty bed: [facility] Discussed above plan with patient and RN: Deitra Favre 
  
Transition of Care: Plan to follow weekly and as needed while admitted to hospital.  
Tessa Blackmon 2070 BSN RN Wound Care Department Office: [phone] Pager: [phone]

## 2019-10-17 NOTE — PROGRESS NOTES
Palliative Medicine Social Work Met briefly with patient and his two daughters/LNOK Shine Raymond and Moraima in room. Patient was sitting up in chair, pleasant, able to answer simple questions. Shine Lunain said they decided to try to get patient to [place] to be with them there. Updated CM. Thank you for the opportunity to be involved in the care of  Mr. Tri Mcdonough and his family. Abilio Tavares LMSW, Supervisee in Social Work Palliative Medicine  [phone]

## 2019-10-17 NOTE — PROGRESS NOTES
Problem: Mobility Impaired (Adult and Pediatric) Goal: *Acute Goals and Plan of Care (Insert Text) Description FUNCTIONAL STATUS PRIOR TO ADMISSION: Baseline unknown-suspect assist for ADLs and Jase-supervision for ambulation with RW (?). 
**Update-patient lived alone until fracture and then in rehab. Went home with daughter to assist but unable to manage with admission for infection. HOME SUPPORT PRIOR TO ADMISSION: Pt lives with his DTR per RN. **Update-daughter was staying with patient after discharge from rehab but lives in [facility] and unable to stay long term with patient Physical Therapy Goals Initiated 10/14/2019 1. Patient will move from supine to sit and sit to supine  and roll side to side in bed with supervision/set-up within 4 days. 2. Patient will perform sit to stand with supervision/set-up within 4 days. 3. Patient will ambulate with supervision/set-up for 150 feet with the least restrictive device within 4 days. 4. Patient will ascend/descend 7 stairs with 1 handrail(s) with minimal assistance/contact guard assist within 4 days. 5. Patient will perform  home exercise program per protocol with minimal assistance/contact guard assist within 4 days. Initiated 10/7/2019 1. Patient will move from supine to sit and sit to supine  in bed with minimal assistance/contact guard assist within 7 day(s). 2.  Patient will transfer from bed to chair and chair to bed with minimal assistance/contact guard assist using the least restrictive device within 7 day(s). 3.  Patient will perform sit to stand with minimal assistance/contact guard assist within 7 day(s). 4.  Patient will ambulate with supervision/set-up for 50 feet with the least restrictive device within 7 day(s). 5.  PT to assess stairs as appropriate. Outcome: Not Progressing Towards Goal 
 PHYSICAL THERAPY TREATMENT Patient: Meghan Ivan (69 y.o. male) Date: 10/17/2019 Diagnosis: Pneumonia [J18.9] Pneumonia Procedure(s) (LRB): 
I&D LEFT HIP AND RESECTION ARTHROPLASTY (Left) 5 Days Post-Op Precautions: Fall, Bed Alarm(dementia) Chart, physical therapy assessment, plan of care and goals were reviewed. ASSESSMENT Patient continues with skilled PT services and is not progressing towards goals. Today patient more subdued and appears more confused at times. Moving slowly and frequent \"dips\" during gait with right hip as well as left. Over the last  two visits , tolerating less distance with increased fatigue. Of note, patient has been up in chair each morning for breakfast and staying in until after lunch. May do better if rests in bed prior to lunch after breakfast.  Discussed with daughter concerns for travel to [place] over at least 2 days of travel and need to get out of car frequently, dealing with incontinence and his overall  weakness/decreased endurance. Feel it would be better to go to SNF for antibiotics and rehab prior to traveling to [place]. Pete Redd Current Level of Function Impacting Discharge (mobility/balance): decreased gait, bed  mobility, transfers, weakness Other factors to consider for discharge: advancing dementia PLAN : 
Patient continues to benefit from skilled intervention to address the above impairments. Continue treatment per established plan of care. to address goals. Recommendation for discharge: (in order for the patient to meet his/her long term goals) Therapy up to 5 days/week in SNF setting This discharge recommendation: 
Has been made in collaboration with the attending provider and/or case management Equipment recommendations for successful discharge (if) home: none SUBJECTIVE:  
Patient stated That would be good.  in response to resting in bed. OBJECTIVE DATA SUMMARY:  
Critical Behavior: 
Neurologic State: Alert Orientation Level: Oriented to person Cognition: Follows commands Safety/Judgement: Decreased insight into deficits, Decreased awareness of need for safety, Decreased awareness of need for assistance Functional Mobility Training: 
Bed Mobility: 
  
  
Sit to Supine: Stand-by assistance Transfers: 
Sit to Stand: Minimum assistance Stand to Sit: Contact guard assistance Balance: 
Sitting: Intact Standing: Impaired; With support Standing - Static: Fair Standing - Dynamic : Fair;Constant support Ambulation/Gait Training: 
Distance (ft): 50 Feet (ft) Assistive Device: Gait belt;Walker, rolling Ambulation - Level of Assistance: Minimal assistance Gait Abnormalities: Shuffling gait; Antalgic;Decreased step clearance Left Side Weight Bearing: As tolerated Stance: Left decreased Step Length: Left shortened;Right shortened After treatment patient left in no apparent distress:  
Supine in bed, Heels elevated for pressure relief, Call bell within reach, Bed / chair alarm activated, Caregiver / family present and Side rails x 3 
 
COMMUNICATION/COLLABORATION:  
The patients plan of care was discussed with: Registered Nurse Romero Conti, PT Time Calculation: 21 mins

## 2019-10-17 NOTE — PROGRESS NOTES
Ortho Daily Progress Note Patient: Cornelio Shearer                   MRN: [id number]  Sex: male YOB: 1927           Age: 80 y.o. 
 
5 Days Post-Op Procedure(s): 
I&D LEFT HIP AND RESECTION ARTHROPLASTY Subjective: Fatigue, minimal hip pain Visit Vitals /67 (BP 1 Location: Left arm, BP Patient Position: Sitting) Pulse 61 Temp 98 °F (36.7 °C) Resp 16 Ht 5' 10\" (1.778 m) Wt 59 kg (130 lb) SpO2 94% BMI 18.65 kg/m² Lab Results: HGB Date/Time Value Ref Range Status 10/15/2019 03:37 AM 8.9 (L) 12.1 - 17.0 g/dL Final  
 
INR Date/Time Value Ref Range Status 09/03/2019 01:21 PM 1.2 (H) 0.9 - 1.1   Final  
  Comment:  
  A single therapeutic range for Vit K antagonists may not be optimal for all indications - see June, 2008 issue of Chest, American College of [employer] Evidence-Based Clinical Practice Guidelines, 8th Edition. Physical Exam: 
 
DRESSING: clean/dry SWELLING: mild NEUROLOGICAL: intact PULSE:yes GENERAL: fatigued, cooperative, no distress MOTION: no pain with gentle hip ROM 
DVT Exam: No evidence of DVT seen on physical exam. 
 
 
Plan: 
 
Remove Aquacel dressing 1 week post-op Remove sutures 2 weeks post-op ABX per ID x 6 weeks DVT prophylaxisAspirin 81mg bid x 1 mo Weight bearing restrictionWBAT Pain Control:stable, mild-to-moderate joint symptoms intermittently, reasonably well controlled by current meds [address] [place] 
10/17/2019  
2:33 PM 
 
 
.

## 2019-10-17 NOTE — DISCHARGE INSTRUCTIONS
Post op Discharge Instructions  [place], [facility]  [phone]    Patient Name: Anmol Bartlett  Date of admission:  10/6/2019  Date of procedure: 10/12/2019   Procedure: Procedure(s):  I&D LEFT HIP AND RESECTION ARTHROPLASTY  PCP: Kristina Lima MD  Date of discharge: No discharge date for patient encounter. Follow up office visit   See Dr. Laura Bains approximately 3-4 weeks from date of surgery. Call [phone] (ext [phone]) to make an appointment. Activity  Walk with your walker with weight bearing restrictions as instructed by your physical therapist.  Continue using your walker until seen for follow-up visit. You should walk daily with the physical therapist.  Perform your exercise routine 3 times a day as instructed by the physical therapist.    Incision Care  The Aquacel (brown, waterproof) surgical dressing is to remain on the hip for 7 days. On the 7th day gently peel the dressing off by carefully lifting the edge and stretching it slightly to break the adhesive seal.    If your Aquacel dressing comes loose/off before the 7th day, you may replace it with a dry sterile gauze dressing; change it daily. Once the incision is not draining, leave it open to air. You may take a shower with the Aquacel dressing in place. Once the Aquacel is removed,  may shower and get your incision wet but do not submerge your incision under water in a bath tub, hot tub or swimming pool for 6 weeks after surgery. Preventing blood clots  Aspirin 81mg twice daily for 4 weeks following your surgery date      Pain management  - Please take scheduled 650 mg tylenol every 6 hours for 6 weeks  - Please take scheduled meloxicam 7.5 mg daily for 6 week to decrease pain and swelling  - Please take tramadol every 6 hours for pain as needed for pain.     - Avoid alcoholic beverages while taking pain medication  - Do not take any over-the-counter medication for pain except Tylenol (acetaminophen)  - Please be aware that many medications contain Tylenol. We do not want you to over medicate so please read the information below as a guide. Do not take more than 4 Grams of Tylenol in a 24 hour  - You may place an ice bag on your knee for 15-20 minutes after exercising and as needed throughout the day and night      Diet  Resume usual diet; encourage fluids; provide foods high in fiber, calcium and vitamin D  Provide stool softeners/laxatives as needed      After [facility] for SNF/Rehab (to be followed by post-acute provider)    Nursing  Complete head to toe assessment, vital signs  Medication reconciliation  Review pain management  Manage chronic medical conditions  Remove Aquacel dressing 7 days after surgery    Physical Therapy-status at discharge from the hospital    Weight bearing status:  Precautions at Admission: Fall, Bed Alarm(dementia)  Left Side Weight Bearing: As tolerated       Mobility Status:  Supine to Sit: Minimum assistance  Sit to Stand: Minimum assistance  Sit to Supine: Stand-by assistance  Bed to Chair: Moderate assistance    Gait:  Distance (ft): 50 Feet (ft)  Ambulation - Level of Assistance: Minimal assistance  Assistive Device: Gait belt, Walker, rolling  Gait Abnormalities: Shuffling gait, Antalgic, Decreased step clearance    ADL status overall composite:     Dressing Assistance: Moderate assistance(in simulation and inferred with mobility)          Physical Therapy-exercises, transfers, gait-training (partial weight bearing on the surgical leg)    Exercises related to strengthening the surgical hip:  Supine Exercises:   Ankle pumps  Quad Sets  Gluteal Sets  Hip Abduction slides supine  Hip external rotation  Heel Slides    Standing Exercises:  Heel Raises  Mini-squats  Heel/toe touches and knee bend  Marching   Hip Abduction  Hip External Rotation  Hip Extension    Advance exercises with equipment for strengthening, flexibility, balance needs as appropriate per setting. Avoid active hip flexion exercised for 4 weeks. Repetitions and number of sets to be established per patient tolerance     Reasons to call the Surgeon  1. Increased redness, swelling or drainage from the incision site  2. Temperature consistently greater than 101 degrees  3.   Increased pain or unrelieved pain in hip or calf

## 2019-10-17 NOTE — PROGRESS NOTES
Hospitalist Progress Note Federico Warren MD 
Answering service: [phone] OR [phone] from in house phone Date of Service:  10/17/2019 NAME:  Ju Sanchez :  3/18/1927 MRN:  [id number] Admission Summary:  
Patient is a 80year old Male medical history significant for HTN , CAD,and DDD who presents to the Emergency Department accompanied by Daughter via EMS with chief complaint of fever. Patient complains of subjective fever, decreased oral intake  increased weakness after he was discharged from rehab 3 days. 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 He denies chills , Shortness of breath , chest pain , palpitations , leg swelling , syncope or near syncope , headaches , seizures or change in mentation , urinary or bowel complains. 
   
Interval history / Subjective:  
10/17/2019 : 
pleaseant confused no distress. Assessment & Plan:  
 
Sepsis (POA) secondary to L hip prosthetic joint infection - + staph epi - X-ray showed left femoral neck fracture 9/3 
- s/p Left hip hemiarthroplasty 2019 
- CT of the hip with fluid collection. Concern that this may tract deeper. Ortho following for potential surgery - s/p OR 10/12 for hip arthroplasty, antibiotic spacer placement and I and D.  
- Micro growing Staph epi, Methicillin sensitive. - ID consulted, on Cefazolin, with 6 weeks until 19 per Dr. Rina Chan.  
- no changes, afebrile, Lab Results Component Value Date/Time WBC 11.8 (H) 10/15/2019 03:37 AM  
 f/u expectantly . Home on IV abx as above anticipated CT findings of bilateral atelectasis - febrile on admission, however with bilateral very small infiltrates in setting of new LE edema and new onset CHF. Possible that findings on CT are due to CHF.  
- in the setting of fever ,leucocytosis and CXR concerning new bibasilar infiltrates, originally treated for PNA 
- s/p levofloxacin for potential PNA 
- blood culture NGTD  
- DC IVF  
- Cont to monitor  
  
Acute CHF exacerbation. No known history of CHF. Pulmonary edema. NYHA class 3-4 on admission. H/o known CAD, ?ischemic. 
- Echo with mild to moderate CHF, EF 40-45% - On metoprolol 
- Holding lasix  
- Not on ACE/ARB due to CKD. - Family wishes to follow up with his cardiologist outpatient. - I and O and daily weights. - no s/s of hf 10/15/2019 - 10/17/2019  
  
Bilateral bronchiectasis - possible  2/2 recurrent aspiration pneumonia  
- Not in acute exacerbation (no significant sputum production) - x-ray in 9/3 was unremarkable - No prior CT for comparison 
  
HTN  
- BP at goal  
- Cont home med's  
BP Readings from Last 1 Encounters:  
10/17/19 130/67  
  
  
CAD with valvular replacement 
- Stable - Cont home ASA , BB Statins  
  
Increased Generalized weakness  
- likely from the above  
- PT/OT Metabolic encephalopathy - h/o dementia now with intermittent delirium - Add PM Seroquel  
- PRN IV haldol  
- stable mental 10/17/2019 Code status: FULL CODE.  
DVT prophylaxis: Lovenox PTA: home Baseline: Independent with walker Care Plan discussed with: Patient/Family Disposition: TBD Hospital Problems  Date Reviewed: 10/7/2019 Codes Class Noted POA * (Principal) Pneumonia ICD-10-CM: J18.9 ICD-9-CM: 386  10/6/2019 Yes Review of Systems: A comprehensive review of systems was negative except for that written in the HPI. Vital Signs:  
 Last 24hrs VS reviewed since prior progress note. Most recent are: 
Visit Vitals /67 (BP 1 Location: Left arm, BP Patient Position: Sitting) Pulse 61 Temp 98 °F (36.7 °C) Resp 16 Ht 5' 10\" (1.778 m) Wt 59 kg (130 lb) SpO2 94% BMI 18.65 kg/m² Intake/Output Summary (Last 24 hours) at 10/17/2019 1237 Last data filed at 10/16/2019 2353 Gross per 24 hour Intake 60 ml Output 300 ml Net -240 ml Physical Examination:  
 
 
    Stable exam 10/17/2019 Constitutional:  No acute distress, Sleepy post op ENT:  Oral mucousa dry, oropharynx benign. Resp:  CTA bilaterally. No wheezing/rhonchi/rales. No accessory muscle use CV:  Regular rhythm, normal rate, no murmurs, gallops, rubs GI:  Soft, non distended, non tender. normoactive bowel sounds, no hepatosplenomegaly Musculoskeletal:  No edema, warm, 2+ pulses throughout, L hip with tenderness to palpation, bandage without obvious drainage. Neurologic:  Awake, pleasantly confused. Skin:  Good turgor, no rashes or ulcers Data Review:  
 Review and/or order of clinical lab test 
Review and/or order of tests in the radiology section of CPT Review and/or order of tests in the medicine section of CPT Labs:  
 
Recent Labs 10/15/19 
7906 WBC 11.8* HGB 8.9* HCT 27.9*  
 Recent Labs 10/15/19 
7886   
K 3.9 * CO2 30 BUN 23* CREA 1.34* * CA 9.4 MG 2.0 PHOS 1.9* No results for input(s): SGOT, GPT, ALT, AP, TBIL, TBILI, TP, ALB, GLOB, GGT, AML, LPSE in the last 72 hours. No lab exists for component: AMYP, HLPSE No results for input(s): INR, PTP, APTT, INREXT, INREXT in the last 72 hours. No results for input(s): FE, TIBC, PSAT, FERR in the last 72 hours. No results found for: FOL, RBCF No results for input(s): PH, PCO2, PO2 in the last 72 hours. No results for input(s): CPK, CKNDX, TROIQ in the last 72 hours. No lab exists for component: CPKMB Lab Results Component Value Date/Time Cholesterol, total 127 02/19/2010 11:45 AM  
 HDL Cholesterol 55 02/19/2010 11:45 AM  
 LDL, calculated 63.6 02/19/2010 11:45 AM  
 Triglyceride 42 02/19/2010 11:45 AM  
 CHOL/HDL Ratio 2.3 02/19/2010 11:45 AM  
 
Lab Results Component Value Date/Time  Glucose (POC) 128 (H) 10/17/2019 11:57 AM  
 Glucose (POC) 109 (H) 10/06/2019 06:40 PM  
 Glucose (POC) 91 09/04/2019 02:27 PM  
 Glucose (POC) 94 09/03/2019 03:20 PM  
 
Lab Results Component Value Date/Time Color YELLOW/STRAW 10/14/2019 07:49 AM  
 Appearance CLEAR 10/14/2019 07:49 AM  
 Specific gravity 1.012 10/14/2019 07:49 AM  
 pH (UA) 7.0 10/14/2019 07:49 AM  
 Protein TRACE (A) 10/14/2019 07:49 AM  
 Glucose NEGATIVE  10/14/2019 07:49 AM  
 Ketone NEGATIVE  10/14/2019 07:49 AM  
 Bilirubin NEGATIVE  10/14/2019 07:49 AM  
 Urobilinogen 0.2 10/14/2019 07:49 AM  
 Nitrites NEGATIVE  10/14/2019 07:49 AM  
 Leukocyte Esterase NEGATIVE  10/14/2019 07:49 AM  
 Epithelial cells FEW 10/14/2019 07:49 AM  
 Bacteria NEGATIVE  10/14/2019 07:49 AM  
 WBC 0-4 10/14/2019 07:49 AM  
 RBC 0-5 10/14/2019 07:49 AM  
 
 
 
Medications Reviewed:  
 
Current Facility-Administered Medications Medication Dose Route Frequency  traMADol (ULTRAM) tablet 50 mg  50 mg Oral Q6H PRN  
 acetaminophen (TYLENOL) tablet 1,000 mg  1,000 mg Oral Q8H  
 haloperidol lactate (HALDOL) injection 2 mg  2 mg IntraVENous Q6H PRN  
 sodium chloride (NS) flush 5-40 mL  5-40 mL IntraVENous Q8H  
 sodium chloride (NS) flush 5-40 mL  5-40 mL IntraVENous PRN  
 hydrOXYzine HCl (ATARAX) tablet 10 mg  10 mg Oral Q8H PRN  
 naloxone (NARCAN) injection 0.4 mg  0.4 mg IntraVENous PRN  
 senna-docusate (PERICOLACE) 8.6-50 mg per tablet 1 Tab  1 Tab Oral BID  polyethylene glycol (MIRALAX) packet 17 g  17 g Oral DAILY  bisacodyl (DULCOLAX) suppository 10 mg  10 mg Rectal DAILY PRN  
 aspirin delayed-release tablet 81 mg  81 mg Oral BID  
 0.9% sodium chloride infusion 250 mL  250 mL IntraVENous PRN  
 ceFAZolin (ANCEF) 1 g in 0.9% sodium chloride (MBP/ADV) 50 mL  1 g IntraVENous Q12H  
 QUEtiapine (SEROquel) tablet 25 mg  25 mg Oral QHS  balsam peru-castor oil (VENELEX) ointment   Topical BID  [Held by provider] furosemide (LASIX) injection 20 mg  20 mg IntraVENous DAILY  hydrALAZINE (APRESOLINE) 20 mg/mL injection 10 mg  10 mg IntraVENous Q6H PRN  
 calcium-vitamin D (OS-OLEKSANDR) 500 mg-200 unit tablet  1 Tab Oral TID WITH MEALS  docusate sodium (COLACE) capsule 100 mg  100 mg Oral BID PRN  
 metoprolol tartrate (LOPRESSOR) tablet 50 mg  50 mg Oral BID  
 
______________________________________________________________________ EXPECTED LENGTH OF STAY: 8d 21h ACTUAL LENGTH OF STAY:          Justice Loera MD

## 2019-10-18 NOTE — PROGRESS NOTES
Problem: Mobility Impaired (Adult and Pediatric) Goal: *Acute Goals and Plan of Care (Insert Text) Description FUNCTIONAL STATUS PRIOR TO ADMISSION: Baseline unknown-suspect assist for ADLs and Jase-supervision for ambulation with RW (?). 
**Update-patient lived alone until fracture and then in rehab. Went home with daughter to assist but unable to manage with admission for infection. HOME SUPPORT PRIOR TO ADMISSION: Pt lives with his DTR per RN. **Update-daughter was staying with patient after discharge from rehab but lives in [facility] and unable to stay long term with patient Physical Therapy Goals Initiated 10/14/2019 1. Patient will move from supine to sit and sit to supine  and roll side to side in bed with supervision/set-up within 4 days. 2. Patient will perform sit to stand with supervision/set-up within 4 days. 3. Patient will ambulate with supervision/set-up for 150 feet with the least restrictive device within 4 days. 4. Patient will ascend/descend 7 stairs with 1 handrail(s) with minimal assistance/contact guard assist within 4 days. 5. Patient will perform  home exercise program per protocol with minimal assistance/contact guard assist within 4 days. Initiated 10/7/2019 1. Patient will move from supine to sit and sit to supine  in bed with minimal assistance/contact guard assist within 7 day(s). 2.  Patient will transfer from bed to chair and chair to bed with minimal assistance/contact guard assist using the least restrictive device within 7 day(s). 3.  Patient will perform sit to stand with minimal assistance/contact guard assist within 7 day(s). 4.  Patient will ambulate with supervision/set-up for 50 feet with the least restrictive device within 7 day(s). 5.  PT to assess stairs as appropriate. Outcome: Progressing Towards Goal 
 PHYSICAL THERAPY TREATMENT Patient: Alexei Verde (73 y.o. male) Date: 10/18/2019 Diagnosis: Pneumonia [J18.9] Pneumonia Procedure(s) (LRB): 
I&D LEFT HIP AND RESECTION ARTHROPLASTY (Left) 6 Days Post-Op Precautions: Fall, Bed Alarm(dementia) Chart, physical therapy assessment, plan of care and goals were reviewed. ASSESSMENT Patient continues with skilled PT services and is progressing towards goals. Patient doing well today with sit to stand and stood for length of time while cleaning up perianal post BM. He declined walking today but was able to do some seated exercises. Repositioned in chair with pillow under him for some cushion. Recommend up for meals but to rest between. Continue to feel he is too weak to travel 2 days via car to [place] and would benefit from SNF for rehab prior to . Current Level of Function Impacting Discharge (mobility/balance): minimal to mod assist for general mobility. Limited gait to ~50-60 feet but not functional for community and unstable gait. Other factors to consider for discharge: lived alone and can not return to that. PLAN : 
Patient continues to benefit from skilled intervention to address the above impairments. Continue treatment per established plan of care. to address goals. Recommendation for discharge: (in order for the patient to meet his/her long term goals) Therapy up to 5 days/week in SNF setting This discharge recommendation: 
Has been made in collaboration with the attending provider and/or case management Equipment recommendations for successful discharge (if) home: none SUBJECTIVE:  
Patient stated I really don't feel up to it today.  OBJECTIVE DATA SUMMARY:  
Critical Behavior: 
Neurologic State: Alert Orientation Level: Disoriented X4 Cognition: Follows commands Safety/Judgement: Decreased insight into deficits, Decreased awareness of need for safety, Decreased awareness of need for assistance Functional Mobility Training: 
  
  
Transfers: 
Sit to Stand: Contact guard assistance Stand to Sit: Contact guard assistance Balance: 
Sitting: Intact Standing: Impaired; With support Standing - Static: Fair Therapeutic Exercises:  
Seated knee extension; marching in seat Pain Rating: 
No pain reported After treatment patient left in no apparent distress:  
Sitting in chair, Call bell within reach and Bed / chair alarm activated COMMUNICATION/COLLABORATION:  
The patients plan of care was discussed with: Registered Nurse Priscila Salvador, PT Time Calculation: 17 mins

## 2019-10-18 NOTE — PROGRESS NOTES
participated in [facility] rounds this am and Kieran Mott did note that patient's daughters wish is to be able to move  Her dad to [place] in the near future. Presently this may need to be put on hold until patient is medically stable for travel and has been cleared for travel.

## 2019-10-18 NOTE — PROGRESS NOTES
Transitions of care: 
Patient has had problems with urinary retention and MD is aware. Patient remains on ivab at this time and per Infectious disease MD patient may be on CEFAZOLIN FOR SIX WEEKS UNTIL 11/23/2019. Care management will follow for transitions of care at this time. From my point of view I believe it would be in the best interest of patient for patient to stay in the region and possibly go to rehab post acute care.

## 2019-10-18 NOTE — PROGRESS NOTES
NUTRITION brief Consult received d/t pt's dislike for current supplement - Ensure Enlive. Please refer to full nutrition assessment completed 10/14. Spoke with pt and family. Pt reports a poor appetite at present. He dislikes Ensure Enlive. Daughter is bring in Special K protein drinks from home which pt is drinking at least once/day. However, he is interested in trying Ensure Clear (200 kcal, 7 gm pro) with meals. He is also receptive to trying Boost Pudding (240 kcal, 9 gm pro). He remains on a regular diet. Plan- 
1. Change Ensure Enlive to Ensure Clear TID with meals 2. Add Boost Pudding once daily with dinner meal 
 
This service plans to see pt for full reassessment on Monday 10/21.    
 
 
 
Ana Yoder RD

## 2019-10-18 NOTE — PROGRESS NOTES
Patient bladder scan showing greater than 400 for second time during this shift. Contacted attending as directed in order. Telephone order given to straight cath patient again and recheck in 4-6 hours. If patient over 400 again, coe will be ordered. Will perform straight cath again and pass on to day shift.

## 2019-10-18 NOTE — PROGRESS NOTES
Hospitalist Progress Note Kevin Santos MD 
Answering service: [phone] OR [phone] from in house phone Date of Service:  10/18/2019 NAME:  Jet Aguirre :  3/18/1927 MRN:  [id number] Admission Summary:  
Patient is a 80year old Male medical history significant for HTN , CAD,and DDD who presents to the Emergency Department accompanied by Daughter via EMS with chief complaint of fever. Patient complains of subjective fever, decreased oral intake  increased weakness after he was discharged from rehab 3 days. 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 He denies chills , Shortness of breath , chest pain , palpitations , leg swelling , syncope or near syncope , headaches , seizures or change in mentation , urinary or bowel complains. 
   
Interval history / Subjective:  
10/18/2019 : 
Examimed, chart reviwed, cult now suggest pseudomonas lt hip fluid . ? Real vs colonization, pt not toxic, is improved CAMPA< recurrent, will need coe extended 10 + day and Urology f/u as out pt. Assessment & Plan:  
 
Sepsis (POA) secondary to L hip prosthetic joint infection - + staph epi - X-ray showed left femoral neck fracture 9/3 
- s/p Left hip hemiarthroplasty 2019 
- CT of the hip with fluid collection. Concern that this may tract deeper. Ortho following for potential surgery - s/p OR 10/12 for hip arthroplasty, antibiotic spacer placement and I and D.  
- Micro growing Staph epi, Methicillin sensitive. - ID consulted, on Cefazolin, with 6 weeks until 19 per Dr. Kerline Bar.  
- no changes, afebrile, Lab Results Component Value Date/Time WBC 11.8 (H) 10/15/2019 03:37 AM  
 f/u expectantly . Home on IV abx as above anticipated CT findings of bilateral atelectasis - febrile on admission, however with bilateral very small infiltrates in setting of new LE edema and new onset CHF. Possible that findings on CT are due to CHF. - in the setting of fever ,leucocytosis and CXR concerning new bibasilar infiltrates, originally treated for PNA 
- s/p levofloxacin for potential PNA 
- blood culture NGTD  
- DC IVF  
- Cont to monitor  
  
Acute CHF exacerbation. No known history of CHF. Pulmonary edema. NYHA class 3-4 on admission. H/o known CAD, ?ischemic. 
- Echo with mild to moderate CHF, EF 40-45% - On metoprolol 
- Holding lasix  
- Not on ACE/ARB due to CKD. - Family wishes to follow up with his cardiologist outpatient. - I and O and daily weights. - no s/s of hf 10/15/2019 - 10/18/2019  
  
Bilateral bronchiectasis - possible  2/2 recurrent aspiration pneumonia  
- Not in acute exacerbation (no significant sputum production) - x-ray in 9/3 was unremarkable - No prior CT for comparison CAMPA for coe as cont to retain urine. 700 cc's this   10/18/2019 am multiple straight caths over past 24 hours with high volumes.  
  
HTN  
- BP at goal  
- Cont home med's  
BP Readings from Last 1 Encounters:  
10/18/19 185/74  
  
  
CAD with valvular replacement 
- Stable - Cont home ASA , BB Statins  
  
Increased Generalized weakness  
- likely from the above  
- PT/OT Metabolic encephalopathy - h/o dementia now with intermittent delirium - Add PM Seroquel  
- PRN IV haldol  
- stable mental 10/18/2019 Code status: FULL CODE.  
DVT prophylaxis: Lovenox PTA: home Baseline: Independent with walker Care Plan discussed with: Patient/Family Disposition: TBD Hospital Problems  Date Reviewed: 10/7/2019 Codes Class Noted POA * (Principal) Pneumonia ICD-10-CM: J18.9 ICD-9-CM: 524  10/6/2019 Yes Review of Systems: A comprehensive review of systems was negative except for that written in the HPI. Vital Signs: Last 24hrs VS reviewed since prior progress note. Most recent are: 
Visit Vitals /74 (BP 1 Location: Left arm) Pulse 73 Temp 98.5 °F (36.9 °C) Resp 19 Ht 5' 10\" (1.778 m) Wt 56.8 kg (125 lb 3.2 oz) SpO2 96% BMI 17.96 kg/m² Intake/Output Summary (Last 24 hours) at 10/18/2019 1105 Last data filed at 10/18/2019 3669 Gross per 24 hour Intake  Output 1575 ml Net -1575 ml Physical Examination:  
 
 
    Stable exam 10/18/2019 Constitutional:  No acute distress, Sleepy post op ENT:  Oral mucousa dry, oropharynx benign. Resp:  CTA bilaterally. No wheezing/rhonchi/rales. No accessory muscle use CV:  Regular rhythm, normal rate, no murmurs, gallops, rubs GI:  Soft, non distended, non tender. normoactive bowel sounds, no hepatosplenomegaly Musculoskeletal:  No edema, warm, 2+ pulses throughout, L hip with tenderness to palpation, bandage without obvious drainage. Neurologic:  Awake, pleasantly confused. Skin:  Good turgor, no rashes or ulcers Data Review:  
 Review and/or order of clinical lab test 
Review and/or order of tests in the radiology section of CPT Review and/or order of tests in the medicine section of CPT Labs:  
 
No results for input(s): WBC, HGB, HCT, PLT, HGBEXT, HCTEXT, PLTEXT, HGBEXT, HCTEXT, PLTEXT in the last 72 hours. No results for input(s): NA, K, CL, CO2, BUN, CREA, GLU, CA, MG, PHOS, URICA in the last 72 hours. No results for input(s): SGOT, GPT, ALT, AP, TBIL, TBILI, TP, ALB, GLOB, GGT, AML, LPSE in the last 72 hours. No lab exists for component: AMYP, HLPSE No results for input(s): INR, PTP, APTT, INREXT, INREXT in the last 72 hours. No results for input(s): FE, TIBC, PSAT, FERR in the last 72 hours. No results found for: FOL, RBCF No results for input(s): PH, PCO2, PO2 in the last 72 hours. No results for input(s): CPK, CKNDX, TROIQ in the last 72 hours. No lab exists for component: CPKMB Lab Results Component Value Date/Time Cholesterol, total 127 02/19/2010 11:45 AM  
 HDL Cholesterol 55 02/19/2010 11:45 AM  
 LDL, calculated 63.6 02/19/2010 11:45 AM  
 Triglyceride 42 02/19/2010 11:45 AM  
 CHOL/HDL Ratio 2.3 02/19/2010 11:45 AM  
 
Lab Results Component Value Date/Time Glucose (POC) 104 (H) 10/17/2019 04:39 PM  
 Glucose (POC) 128 (H) 10/17/2019 11:57 AM  
 Glucose (POC) 109 (H) 10/06/2019 06:40 PM  
 Glucose (POC) 91 09/04/2019 02:27 PM  
 Glucose (POC) 94 09/03/2019 03:20 PM  
 
Lab Results Component Value Date/Time Color YELLOW/STRAW 10/14/2019 07:49 AM  
 Appearance CLEAR 10/14/2019 07:49 AM  
 Specific gravity 1.012 10/14/2019 07:49 AM  
 pH (UA) 7.0 10/14/2019 07:49 AM  
 Protein TRACE (A) 10/14/2019 07:49 AM  
 Glucose NEGATIVE  10/14/2019 07:49 AM  
 Ketone NEGATIVE  10/14/2019 07:49 AM  
 Bilirubin NEGATIVE  10/14/2019 07:49 AM  
 Urobilinogen 0.2 10/14/2019 07:49 AM  
 Nitrites NEGATIVE  10/14/2019 07:49 AM  
 Leukocyte Esterase NEGATIVE  10/14/2019 07:49 AM  
 Epithelial cells FEW 10/14/2019 07:49 AM  
 Bacteria NEGATIVE  10/14/2019 07:49 AM  
 WBC 0-4 10/14/2019 07:49 AM  
 RBC 0-5 10/14/2019 07:49 AM  
 
 
 
Medications Reviewed:  
 
Current Facility-Administered Medications Medication Dose Route Frequency  traMADol (ULTRAM) tablet 50 mg  50 mg Oral Q6H PRN  
 acetaminophen (TYLENOL) tablet 1,000 mg  1,000 mg Oral Q8H  
 haloperidol lactate (HALDOL) injection 2 mg  2 mg IntraVENous Q6H PRN  
 sodium chloride (NS) flush 5-40 mL  5-40 mL IntraVENous Q8H  
 sodium chloride (NS) flush 5-40 mL  5-40 mL IntraVENous PRN  
 hydrOXYzine HCl (ATARAX) tablet 10 mg  10 mg Oral Q8H PRN  
 naloxone (NARCAN) injection 0.4 mg  0.4 mg IntraVENous PRN  
 senna-docusate (PERICOLACE) 8.6-50 mg per tablet 1 Tab  1 Tab Oral BID  polyethylene glycol (MIRALAX) packet 17 g  17 g Oral DAILY  bisacodyl (DULCOLAX) suppository 10 mg  10 mg Rectal DAILY PRN  
  aspirin delayed-release tablet 81 mg  81 mg Oral BID  
 0.9% sodium chloride infusion 250 mL  250 mL IntraVENous PRN  
 ceFAZolin (ANCEF) 1 g in 0.9% sodium chloride (MBP/ADV) 50 mL  1 g IntraVENous Q12H  
 QUEtiapine (SEROquel) tablet 25 mg  25 mg Oral QHS  balsam peru-castor oil (VENELEX) ointment   Topical BID  [Held by provider] furosemide (LASIX) injection 20 mg  20 mg IntraVENous DAILY  hydrALAZINE (APRESOLINE) 20 mg/mL injection 10 mg  10 mg IntraVENous Q6H PRN  
 calcium-vitamin D (OS-OLEKSANDR) 500 mg-200 unit tablet  1 Tab Oral TID WITH MEALS  docusate sodium (COLACE) capsule 100 mg  100 mg Oral BID PRN  
 metoprolol tartrate (LOPRESSOR) tablet 50 mg  50 mg Oral BID  
 
______________________________________________________________________ EXPECTED LENGTH OF STAY: 8d 21h ACTUAL LENGTH OF STAY:          Rigoberto Torres MD

## 2019-10-18 NOTE — PROGRESS NOTES
did receive a call from [address] [place] [address] [employer]. Her phone is [phone]. She was given an update on patient's status at this time. She asks that  touch base with her early next week.

## 2019-10-18 NOTE — PROGRESS NOTES
Notified Dr. Criss Funez that pt bladder scanned for 259, pt has not had any output since 0630. Pt's family states he is more drowsy today and not having much intake. SBP in the 170s. Order received for  mL bolus over an hour.

## 2019-10-19 PROBLEM — A41.9 SEPSIS (HCC): Status: ACTIVE | Noted: 2019-01-01

## 2019-10-19 NOTE — HOSPICE
[employer] [facility] Help to Those in Need 
[phone] Patient Name: Ju Sanchez YOB: 1927 Age: 80 y.o. [employer] RN Note:  Hospice consult received, through [employer]. Liaison currently reviewing chart in anticipation of meeting with patient and family. Will follow up with Unit Nurse and Care Manager to discuss plan of care, patient status and discharge disposition within the hour. Thank you for the opportunity to be of service to this patient. Rachel Stanton RN, [facility] Hospice Nurse Liaison [phone] [place] [phone] Office

## 2019-10-19 NOTE — HOSPICE
*Family request Hospice to contact PCP, Dr. Aide Urena on Monday* [facility] Help to Those in Need 
[phone] Inpatient Nursing Admission Patient Name: Amanda Medina YOB: 1927 Age: 80 y.o. Date of Hospice Admission: 10/19/2019 Hospice Attending Elected by Patient: Lizeth Serrano MD 
Primary Care Physician: Quin Santiago MD 
Admitting RN: Janine Tai RN, [facility] : Not available Level of Care (GIP/Routine/Respite): GIP Facility of Care: [facility] Patient Room: 5/ HOSPICE SUMMARY  
ER Visits/ Hospitalizations in past year: 2 Hospice Diagnosis: Sepsis (POA) secondary to L hip prosthetic joint infection - + staph epi Onset Date of Hospice Diagnosis: September 2019 Summary of Disease Progression Leading to Hospice Diagnosis:  Patient complains of subjective fever, decreased oral intake  increased weakness after he was discharged from rehab 3 days. PEr chart review ,Dauther reporters ,productive cough or scanty yellowish sputum and difficulty of swallowing for solid food Co-Morbidities:  
Patient Active Problem List  
Diagnosis Code  Fracture of unspecified part of neck of left femur, initial encounter for closed fracture ([facility] [address] S72.002A  Hypertension I10  
 Fall from ground level D34.79LF  Pneumonia J18.9  Sepsis ([facility] [address] A41.9 Diagnoses RELATED to the terminal prognosis: Frequent falls, CAD with valvular replacement, HX Coronary Artery Bypass Graft, joint infection, Bilateral atelectasis, Acute CHF exacerbation Other Diagnoses: HTN , CAD,and DDD, metabolic encephalopathy Rationale for a prognosis of life expectancy of 6 months or less if the disease follows its normal course (Disease Specific History): Amanda Medina is a 80 y.o. who was admitted to [facility].  The patient's principle diagnosis of Sepsis secondary to L hip Prosthetic Pt and sister verbalize understanding of POC for today.   joint infection has resulted in recurrent hospitalizations and surgical procedures, resulting in infections. Functionally, the patient's Palliative Performance Scale has declined over a period of 2 months and is estimated at 10. . Objective information that support this patients limited prognosis includes: New Pleural Effusions, and increased previous pleural effusion. The patient/family chose comfort measures with the support of Hospice. Patient meets for GIP LOC as evidenced by Restlessness, pain and respiratory distress Prognosis estimated based on 10/19/19 clinical assessment is:  
 
[x] Hours to Days ASSESSMENT Patient self-reports:  []  Yes    [x] No  
 
Adult Non-Verbal Pain Assessment Score: 10/10 Face 
[] 0   No particular expression or smile 
[] 1   Occasional grimace, tearing, frowning, wrinkled forehead 
[x] 2   Frequent grimace, tearing, frowning, wrinkled forehead Activity (movement) [] 0   Lying quietly, normal position 
[] 1   Seeking attention through movement or slow, cautious movement 
[x] 2   Restless, excessive activity and/or withdrawal reflexes Guarding 
[] 0   Lying quietly, no positioning of hands over areas of body 
[] 1   Splinting areas of the body, tense 
[x] 2   Rigid, stiff Physiology (vital signs) 
[] 0   Stable vital signs [] 1   Change in any of the following: SBP > 20mm Hg; HR > 20/minute [x] 2   Change in any of the following: SBP > 30mm Hg; HR > 25/minute Respiratory 
[] 0   Baseline RR/SpO2, compliant with ventilator 
[] 1   RR > 10 above baseline, or 5% drop SpO2, mild asynchrony with ventilator 
[x] 2   RR > 20 above baseline, or 10% drop SpO2, asynchrony with ventilator SYMPTOMS: Pain, respiratory distress with increased respirations, restlessness and agitation. Pt has been combative over the past 12-24 hours. SIGNS/PHYSICAL FINDINGS: Pt grimacing, moaning, and restless. KARNOFSKY: 10 
 
FAST for all dementia:   
 
 Learning Assessment: 
Patient Is patient willing/able to learn? NO What is the highest level of education completed? Learning preference (written material, demonstration, visual)? Learning barriers (ESOL, [place], poor vision)? Caregiver Is caregiver willing to learn care for patient? YES What is the highest level of education completed? Learning preference (written material, demonstration, visual)? Learning barriers (ESOL, [place], poor vision)? CLINICAL INFORMATION Wt Readings from Last 3 Encounters:  
10/19/19 61.5 kg (135 lb 9.6 oz) 09/09/19 61.8 kg (136 lb 3.2 oz) 07/02/15 63.6 kg (140 lb 3.2 oz) Ht Readings from Last 3 Encounters:  
10/14/19 5' 10\" (1.778 m) 09/04/19 5' 10\" (1.778 m) 07/02/15 5' 6\" (1.676 m) There is no height or weight on file to calculate BMI. There were no vitals taken for this visit. LAB VALUES No results found for this visit on 10/19/19 (from the past 12 hour(s)). No results found for this visit on 10/19/19 (from the past 6 hour(s)). Lab Results Component Value Date/Time Protein, total 6.6 10/06/2019 05:29 PM  
 Albumin 2.7 (L) 10/06/2019 05:29 PM  
 
 
Currently this patient has: 
[x] Supplemental O2 [] Peripheral IV  [x] PICC    [] PORT [x] Lucero Catheter [] NG Tube   [] PEG Tube [] Ostomy   
[] AICD: Has ICD been deactivated? [] Yes [] No:______ PLAN 1. Education of patient, family and staff re end of life care 2. Provide support and frequent rounds for patient comfort and safety 3. Schedule IV medications for pain 4. Schedule IV medications for restlessness and agitation 5. Schedule IV medications for respiratory distress 6. Provide IV PRN for symptom management. 7. Lucero care per hospital policy 8. PICC line care per hospital policy 9. Contact PCP on Monday to offer if PCP wants to follow patient while on hospice care per family request. 
 
Hospice Team Frequency Orders: Skilled Nurse -  Daily x 7 days /every other day x 7 days  with 5 PRN visits for symptom control. MSW  1 visit for initial assessment/evaluation for family support and need for volunteer services. Patricia Acosta  1 visit for initial assessment/evaluation for spiritual support. ADVANCE CARE PLANNING (Complete in ACP Flow Sheet) Code Status: DNR Durable DNR: [x]  Yes  Signed by MPOA and on chart for MD signature. Code Status Discussed/Confirmed: Yes Preference for Other Life Sustaining Treatment Discussed/Confirmed: Yes Hospitalization Preference: Patient would like to receive end of life care in the hospital, and if stabilizes, return home with hospice support Advance Care Planning 10/7/2019 Confirm Advance Directive None  Service: [] Yes  []  No      [x] Unknown Appropriate for Pinning Ceremony:  [] Yes     [] No 
Sikhism: NO Adventist  Home: [place] [address] for [employer] DISCHARGE PLANNING 1. Discharge Plan: If patient stabilizes, family would like to consider taking him home with hospice assistance. Will also consider [facility]. 2. Patient/Family teaching: Ubaldo Nurse and patientsw second daughter, Judie Robertson available for bedside education, safety review and to review hospice plan of care 3. Response to patient/family teaching: Accepting of plan of care. SOCIAL/EMOTIONAL/SPIRITUAL NEEDS Spiritual Issues Identified: None identified Psych/ Social/ Emotional Issues Identified: PUSHPA, and second daughter both live in [place]. Sai Vitale, is emotional and states that she feels that her father wanted to be at home with hospice care at the end of his life, like his wife did, 20 years ago. Support offered, and plan to review home situation with family daily. Caregiver Support: 
[x] Provided information on End of Life Care  
[x] Material Provided: Niki Rider From My Sight or [facility]  
 
CARE COORDINATION Dr. Jens Pierce contacted, discharge to hospice order received Dr. Kalani Hauser contacted, agrees to serve as attending provider for hospice and provided verbal certification of terminal illness with life expectancy of 6 months or less. Orders for hospice admission, medications and plan of treatment received. Medication reconciliation completed. MEDS: See medication list below DME: Per hospital 
Supplies: Per hospital 
IDT communication to include MD, , SW, CH and support team 
 
ALLERGIES AND MEDICATIONS Allergies: No Known Allergies Current Facility-Administered Medications Medication Dose Route Frequency  LORazepam (ATIVAN) injection 1 mg  1 mg IntraVENous Q15MIN PRN  
 haloperidol lactate (HALDOL) injection 2 mg  2 mg IntraVENous Q4H PRN  
 acetaminophen (TYLENOL) suppository 650 mg  650 mg Rectal Q4H PRN  
 ketorolac (TORADOL) injection 30 mg  30 mg IntraVENous Q8H PRN  
 scopolamine (TRANSDERM-SCOP) 1 mg over 3 days 1 Patch  1 Patch TransDERmal Q72H PRN  
 glycopyrrolate (ROBINUL) injection 0.2 mg  0.2 mg IntraVENous Q4H PRN  
 bisacodyl (DULCOLAX) suppository 10 mg  10 mg Rectal DAILY PRN  
 morphine injection 2 mg  2 mg IntraVENous Q4H  
 LORazepam (ATIVAN) injection 1 mg  1 mg IntraVENous Q4H  
 morphine injection 1 mg  1 mg IntraVENous Q15MIN PRN  
 sodium chloride (NS) flush 5 mL  5 mL InterCATHeter PRN Raj Gandara RN, [facility] Hospice Nurse Liaison [phone] Mobile [phone] Office

## 2019-10-19 NOTE — PROGRESS NOTES
Hospitalist Progress Note Sury Kessler MD 
Answering service: [phone] OR 1973 from in house phone Date of Service:  10/19/2019 NAME:  Blane Millan :  3/18/1927 MRN:  [id number] Admission Summary:  
Patient is a 80year old Male medical history significant for HTN , CAD,and DDD who presents to the Emergency Department accompanied by Daughter via EMS with chief complaint of fever. Patient complains of subjective fever, decreased oral intake  increased weakness after he was discharged from rehab 3 days. 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 He denies chills , Shortness of breath , chest pain , palpitations , leg swelling , syncope or near syncope , headaches , seizures or change in mentation , urinary or bowel complains. 
   
Interval history / Subjective:  
10/19/2019 : 
 
Agitated last pm. Better this am afeb; lab in am . No anticipated changes other than this writer feels best dispo is to SNF. , preferably locally. Now with coe indwelling as not able to void adequately,   
 
Assessment & Plan:  
 
Sepsis (POA) secondary to L hip prosthetic joint infection - + staph epi - X-ray showed left femoral neck fracture 9/3 
- s/p Left hip hemiarthroplasty 2019 
- CT of the hip with fluid collection. Concern that this may tract deeper. Ortho following for potential surgery - s/p OR 10/12 for hip arthroplasty, antibiotic spacer placement and I and D.  
- Micro growing Staph epi, Methicillin sensitive. - ID consulted, on Cefazolin, with 6 weeks until 19 per Dr. Soria Model.  
- no changes, afebrile, Lab Results Component Value Date/Time WBC 11.8 (H) 10/15/2019 03:37 AM  
 f/u expectantly . Home on IV abx as above anticipated Lab in am 10/19 CAMPA, recurrent high post void residuals after removing coe form post op status (in for 5 days initially post op), now with indwelling coe again since 10/17 - 10/19/2019 CT findings of bilateral atelectasis - febrile on admission, however with bilateral very small infiltrates in setting of new LE edema and new onset CHF. Possible that findings on CT are due to CHF. - in the setting of fever ,leucocytosis and CXR concerning new bibasilar infiltrates, originally treated for PNA 
- s/p levofloxacin for potential PNA 
- blood culture NGTD  
- DC IVF  
- Cont to monitor  
  
Acute CHF exacerbation. No known history of CHF. Pulmonary edema. NYHA class 3-4 on admission. H/o known CAD, ?ischemic. 
- Echo with mild to moderate CHF, EF 40-45% - On metoprolol 
- Holding lasix  
- Not on ACE/ARB due to CKD. - Family wishes to follow up with his cardiologist outpatient. - I and O and daily weights. - no s/s of hf 10/15/2019 - 10/19/2019  
  
Bilateral bronchiectasis - possible  2/2 recurrent aspiration pneumonia  
- Not in acute exacerbation (no significant sputum production) - x-ray in 9/3 was unremarkable - No prior CT for comparison CAMPA for coe as cont to retain urine. 700 cc's this   10/18/2019 am multiple straight caths over past 24 hours with high volumes.  
  
HTN  
- BP at goal  
- Cont home med's  
BP Readings from Last 1 Encounters:  
10/19/19 162/66  
  
  
CAD with valvular replacement 
- Stable - Cont home ASA , BB Statins  
  
Increased Generalized weakness  
- likely from the above  
- PT/OT Metabolic encephalopathy - h/o dementia now with intermittent delirium - Add PM Seroquel  
- PRN IV haldol  
- stable mental 10/19/2019 Code status: FULL CODE.  
DVT prophylaxis: Lovenox PTA: home Baseline: Independent with walker Care Plan discussed with: Patient/Family Disposition: TBD Hospital Problems  Date Reviewed: 10/7/2019 Codes Class Noted POA * (Principal) Pneumonia ICD-10-CM: J18.9 ICD-9-CM: 175  10/6/2019 Yes Review of Systems:  
 poor historian Vital Signs:  
 Last 24hrs VS reviewed since prior progress note. Most recent are: 
Visit Vitals /66 (BP 1 Location: Left arm, BP Patient Position: At rest) Pulse 97 Temp 97.9 °F (36.6 °C) Resp 15 Ht 5' 10\" (1.778 m) Wt 61.5 kg (135 lb 9.6 oz) SpO2 96% BMI 19.46 kg/m² Intake/Output Summary (Last 24 hours) at 10/19/2019 7928 Last data filed at 10/18/2019 2243 Gross per 24 hour Intake 120 ml Output 610 ml Net -490 ml Physical Examination:  
 
 
    Stable exam 10/19/2019 Constitutional:  No acute distress,  Consfused. ENT:  Oral mucousa dry, oropharynx benign. Resp:  CTA bilaterally. No wheezing/rhonchi/rales. No accessory muscle use CV:  Regular rhythm, normal rate, no murmurs, gallops, rubs GI:  Soft, non distended, non tender. normoactive bowel sounds, no hepatosplenomegaly Musculoskeletal:  No edema, warm, 2+ pulses throughout, L hip with tenderness to palpation, bandage without obvious drainage. Neurologic:  Awake, pleasantly confused. Skin:  Fair  turgor, no rashes or ulcers Data Review:  
 Review and/or order of clinical lab test 
Review and/or order of tests in the radiology section of CPT Review and/or order of tests in the medicine section of CPT Labs:  
 
No results for input(s): WBC, HGB, HCT, PLT, HGBEXT, HCTEXT, PLTEXT, HGBEXT, HCTEXT, PLTEXT in the last 72 hours. No results for input(s): NA, K, CL, CO2, BUN, CREA, GLU, CA, MG, PHOS, URICA in the last 72 hours. No results for input(s): SGOT, GPT, ALT, AP, TBIL, TBILI, TP, ALB, GLOB, GGT, AML, LPSE in the last 72 hours. No lab exists for component: AMYP, HLPSE No results for input(s): INR, PTP, APTT, INREXT, INREXT in the last 72 hours. No results for input(s): FE, TIBC, PSAT, FERR in the last 72 hours. No results found for: FOL, RBCF No results for input(s): PH, PCO2, PO2 in the last 72 hours. No results for input(s): CPK, CKNDX, TROIQ in the last 72 hours. No lab exists for component: CPKMB Lab Results Component Value Date/Time Cholesterol, total 127 02/19/2010 11:45 AM  
 HDL Cholesterol 55 02/19/2010 11:45 AM  
 LDL, calculated 63.6 02/19/2010 11:45 AM  
 Triglyceride 42 02/19/2010 11:45 AM  
 CHOL/HDL Ratio 2.3 02/19/2010 11:45 AM  
 
Lab Results Component Value Date/Time Glucose (POC) 159 (H) 10/18/2019 04:34 PM  
 Glucose (POC) 104 (H) 10/17/2019 04:39 PM  
 Glucose (POC) 128 (H) 10/17/2019 11:57 AM  
 Glucose (POC) 109 (H) 10/06/2019 06:40 PM  
 Glucose (POC) 91 09/04/2019 02:27 PM  
 Glucose (POC) 94 09/03/2019 03:20 PM  
 
Lab Results Component Value Date/Time Color YELLOW/STRAW 10/14/2019 07:49 AM  
 Appearance CLEAR 10/14/2019 07:49 AM  
 Specific gravity 1.012 10/14/2019 07:49 AM  
 pH (UA) 7.0 10/14/2019 07:49 AM  
 Protein TRACE (A) 10/14/2019 07:49 AM  
 Glucose NEGATIVE  10/14/2019 07:49 AM  
 Ketone NEGATIVE  10/14/2019 07:49 AM  
 Bilirubin NEGATIVE  10/14/2019 07:49 AM  
 Urobilinogen 0.2 10/14/2019 07:49 AM  
 Nitrites NEGATIVE  10/14/2019 07:49 AM  
 Leukocyte Esterase NEGATIVE  10/14/2019 07:49 AM  
 Epithelial cells FEW 10/14/2019 07:49 AM  
 Bacteria NEGATIVE  10/14/2019 07:49 AM  
 WBC 0-4 10/14/2019 07:49 AM  
 RBC 0-5 10/14/2019 07:49 AM  
 
 
 
Medications Reviewed:  
 
Current Facility-Administered Medications Medication Dose Route Frequency  traMADol (ULTRAM) tablet 50 mg  50 mg Oral Q6H PRN  
 acetaminophen (TYLENOL) tablet 1,000 mg  1,000 mg Oral Q8H  
 haloperidol lactate (HALDOL) injection 2 mg  2 mg IntraVENous Q6H PRN  
 sodium chloride (NS) flush 5-40 mL  5-40 mL IntraVENous Q8H  
 sodium chloride (NS) flush 5-40 mL  5-40 mL IntraVENous PRN  
 hydrOXYzine HCl (ATARAX) tablet 10 mg  10 mg Oral Q8H PRN  
 naloxone (NARCAN) injection 0.4 mg  0.4 mg IntraVENous PRN  
 senna-docusate (PERICOLACE) 8.6-50 mg per tablet 1 Tab  1 Tab Oral BID  
  polyethylene glycol (MIRALAX) packet 17 g  17 g Oral DAILY  bisacodyl (DULCOLAX) suppository 10 mg  10 mg Rectal DAILY PRN  
 aspirin delayed-release tablet 81 mg  81 mg Oral BID  
 0.9% sodium chloride infusion 250 mL  250 mL IntraVENous PRN  
 ceFAZolin (ANCEF) 1 g in 0.9% sodium chloride (MBP/ADV) 50 mL  1 g IntraVENous Q12H  
 QUEtiapine (SEROquel) tablet 25 mg  25 mg Oral QHS  balsam peru-castor oil (VENELEX) ointment   Topical BID  [Held by provider] furosemide (LASIX) injection 20 mg  20 mg IntraVENous DAILY  hydrALAZINE (APRESOLINE) 20 mg/mL injection 10 mg  10 mg IntraVENous Q6H PRN  
 calcium-vitamin D (OS-OLEKSANDR) 500 mg-200 unit tablet  1 Tab Oral TID WITH MEALS  docusate sodium (COLACE) capsule 100 mg  100 mg Oral BID PRN  
 metoprolol tartrate (LOPRESSOR) tablet 50 mg  50 mg Oral BID  
 
______________________________________________________________________ EXPECTED LENGTH OF STAY: 8d 21h ACTUAL LENGTH OF STAY:          Iwona Rust MD

## 2019-10-19 NOTE — PROGRESS NOTES
Patient is pulling at his coe catheter and attempting to get OOB. Patient is confused, swinging and hitting at the staff. Staff attempted multiple times to reorient without success. Patient given prn dose of Haldol 2.5 mg IV to prevent him from pulling Coe catheter and PICC line out. Will continue to monitor patient throughout the end of this shift.

## 2019-10-19 NOTE — PROGRESS NOTES
ORTH FRACTURE PROGRESS NOTE 2019 Admit Date:  
10/6/2019 Post Op day: 7 Days Post-Op Subjective:   
Kina Brock has no complaints today. Remains confused. Apparently was agitated overnight but calm this morning. Remains on Ancef and will remain on such for 6wks per ID. No new issues. Tolerating diet Denies N/V/SOB or CP 
 
PT/OT:  
Gait:  Gait Speed/Milka: Pace decreased (<100 feet/min) Step Length: Left shortened, Right shortened Stance: Left decreased Gait Abnormalities: Shuffling gait, Antalgic, Decreased step clearance Ambulation - Level of Assistance: Minimal assistance Distance (ft): 50 Feet (ft) Assistive Device: Gait belt, Walker, rolling Vital Signs:   
Patient Vitals for the past 8 hrs: 
 BP Temp Pulse Resp SpO2 Weight 10/19/19 0819 162/66 97.9 °F (36.6 °C) 97 15 96 %   
10/19/19 0730      61.5 kg (135 lb 9.6 oz) Temp (24hrs), Av.6 °F (36.4 °C), Min:97.3 °F (36.3 °C), Max:97.9 °F (36.6 °C) Pain Control:  
Pain Assessment Pain Scale 1: Behavioral Pain Scale (BPS) Pain Intensity 1: 0 Pain Onset 1: surgical 
Pain Location 1: Hip Pain Orientation 1: Left Pain Description 1: (pt denies pain) Pain Intervention(s) 1: Repositioned Meds: 
 
Current Facility-Administered Medications Medication Dose Route Frequency  traMADol (ULTRAM) tablet 50 mg  50 mg Oral Q6H PRN  
 acetaminophen (TYLENOL) tablet 1,000 mg  1,000 mg Oral Q8H  
 haloperidol lactate (HALDOL) injection 2 mg  2 mg IntraVENous Q6H PRN  
 sodium chloride (NS) flush 5-40 mL  5-40 mL IntraVENous Q8H  
 sodium chloride (NS) flush 5-40 mL  5-40 mL IntraVENous PRN  
 hydrOXYzine HCl (ATARAX) tablet 10 mg  10 mg Oral Q8H PRN  
 naloxone (NARCAN) injection 0.4 mg  0.4 mg IntraVENous PRN  
 senna-docusate (PERICOLACE) 8.6-50 mg per tablet 1 Tab  1 Tab Oral BID  polyethylene glycol (MIRALAX) packet 17 g  17 g Oral DAILY  bisacodyl (DULCOLAX) suppository 10 mg  10 mg Rectal DAILY PRN  
 aspirin delayed-release tablet 81 mg  81 mg Oral BID  
 0.9% sodium chloride infusion 250 mL  250 mL IntraVENous PRN  
 ceFAZolin (ANCEF) 1 g in 0.9% sodium chloride (MBP/ADV) 50 mL  1 g IntraVENous Q12H  
 QUEtiapine (SEROquel) tablet 25 mg  25 mg Oral QHS  balsam peru-castor oil (VENELEX) ointment   Topical BID  [Held by provider] furosemide (LASIX) injection 20 mg  20 mg IntraVENous DAILY  hydrALAZINE (APRESOLINE) 20 mg/mL injection 10 mg  10 mg IntraVENous Q6H PRN  
 calcium-vitamin D (OS-OLEKSANDR) 500 mg-200 unit tablet  1 Tab Oral TID WITH MEALS  docusate sodium (COLACE) capsule 100 mg  100 mg Oral BID PRN  
 metoprolol tartrate (LOPRESSOR) tablet 50 mg  50 mg Oral BID  
 
 
LAB:   
No results for input(s): HCT, HGB, INR, HCTEXT, HGBEXT, INREXT in the last 72 hours. Transfuse PRBC's:   
 
Assessment & Physician's Comment: 
Aquacel peeling up with tape over top - removed entire dressing Incision c/d/i with nylon sutures in place No erythema noted or drainage Thigh soft and compressible Calves soft and non-tender Moves ankles and toes to command Dressing is clean, dry, and intact Neurovascular checks within normal limits Orientation:  Disoriented (baseline) Principal Problem: 
  Pneumonia (10/6/2019) Plan: 
 
Place dry dressing over incision for protection PT/OT for mobilization as able - WBAT Tylenol for pain ASA for DVT prophylaxis Ice packs to hip PRN Abx per ID/Medicine Medical management per primary team 
Case Management for disposition planning - local SNF? Dede Brian PA-C [id number] [facility]

## 2019-10-19 NOTE — HOSPICE
[employer] [facility] 
[phone] Patient Name: Liliya Novak YOB: 1927 Age: 80 y.o. [employer] RN Note:  Hospice consult noted. Chart reviewed. Plan of care discussed with patients nurse & care manager. In to meet with patient, and his two daughters. Daughter and Maria A Lotto, and Jacobo Roblero both bedside. Discussed Hospice philosophy, general plan of care, levels of care, services and on call procedures. Family information packet provided & reviewed with family. Reviewed patient with Dr. Kim Bolanos, who is the hospice on call provider. Verbal CTI received to admit pt GIP LOC for the Diagnosis of Sepsis. Thank you for the opportunity to be of service to this patient. Marylin Contreras RN, [facility] Hospice Nurse Liaison [phone] [place] [phone] Office

## 2019-10-20 NOTE — PROGRESS NOTES
Daily Progress Note Family request Hospice to contact PCP, Dr. Agueda Carter on Monday[address] [facility] 
[phone] 
  
Inpatient Nursing Admission Patient Name: Royal Bhat YOB: 1927 Age: 80 y.o. 
  
  
Date of Hospice Admission: 10/19/2019 Hospice Attending Elected by Patient: Sanam Encarnacion MD 
Primary Care Physician: Erna Muñoz MD 
Admitting RN: Dar Gill RN, [facility] : Not available            
  
Level of Care (GIP/Routine/Respite): GIP Facility of Care: [facility] Patient Room: [facility]/ 
  
 HOSPICE SUMMARY  
ER Visits/ Hospitalizations in past year: 2 
  
Hospice Diagnosis: Sepsis (POA) secondary to L hip prosthetic joint infection - + staph epi 
  
Onset Date of Hospice Diagnosis: September 2019 Summary of Disease Progression Leading to Hospice Diagnosis:  Patient complains of subjective fever, decreased oral intake  increased weakness after he was discharged from rehab 3 days. PEr chart review ,Dauther reporters ,productive cough or scanty yellowish sputum and difficulty of swallowing for solid food  
  
Co-Morbidities:  
    
Patient Active Problem List  
Diagnosis Code  Fracture of unspecified part of neck of left femur, initial encounter for closed fracture ([facility] S72.002A  Hypertension I10  
 Fall from ground level B01.45LN  Pneumonia J18.9  [facility]. 9  
  
Diagnoses RELATED to the terminal prognosis: Frequent falls, CAD with valvular replacement, HX Coronary Artery Bypass Graft, joint infection, Bilateral atelectasis, Acute CHF exacerbation Other Diagnoses: HTN , CAD,and DDD, metabolic encephalopathy 
  
Rationale for a prognosis of life expectancy of 6 months or less if the disease follows its normal course (Disease Specific History):  
  
Royal Bhat is a 80 y.o. who was admitted to [facility].  The patient's principle diagnosis of Sepsis secondary to L hip Prosthetic joint infection has resulted in recurrent hospitalizations and surgical procedures, resulting in infections. Functionally, the patient's Palliative Performance Scale has declined over a period of 2 months and is estimated at 10. . Objective information that support this patients limited prognosis includes: New Pleural Effusions, and increased previous pleural effusion. The patient/family chose comfort measures with the support of Hospice. 
  
Patient meets for GIP LOC as evidenced by Restlessness, pain and respiratory distress 
  
Prognosis estimated based on 10/19/19 clinical assessment is:  
  
[x] Hours to Days  
  
  
ASSESSMENT Patient self-reports:  []  Yes      [x] No  
  
Adult Non-Verbal Pain Assessment Score: 10/10 
  
Face 
[] 0   No particular expression or smile 
[] 1   Occasional grimace, tearing, frowning, wrinkled forehead 
[x] 2   Frequent grimace, tearing, frowning, wrinkled forehead 
  
Activity (movement) 
[] 0   Lying quietly, normal position 
[] 1   Seeking attention through movement or slow, cautious movement [x] 2   Restless, excessive activity and/or withdrawal reflexes 
  
Guarding 
[] 0   Lying quietly, no positioning of hands over areas of body 
[] 1   Splinting areas of the body, tense 
[x] 2   Rigid, stiff 
  
Physiology (vital signs) 
[] 0   Stable vital signs 
[] 1   Change in any of the following: SBP > 20mm Hg; HR > 20/minute [x] 2   Change in any of the following: SBP > 30mm Hg; HR > 25/minute 
  
Respiratory 
[] 0   Baseline RR/SpO2, compliant with ventilator 
[] 1   RR > 10 above baseline, or 5% drop SpO2, mild asynchrony with ventilator [x] 2   RR > 20 above baseline, or 10% drop SpO2, asynchrony with ventilator 
  
  
SYMPTOMS: Pain, respiratory distress with increased respirations, restlessness and agitation.  Pt has been combative over the past 12-24 hours. 
  
SIGNS/PHYSICAL FINDINGS: Pt grimacing, moaning, and restless. 
  
KARNOFSKY: 10 
  
FAST for all dementia:   
   
Learning Assessment: 
Patient Is patient willing/able to learn? NO What is the highest level of education completed? Learning preference (written material, demonstration, visual)? Learning barriers (ESOL, [place], poor vision)? 
  
Caregiver Is caregiver willing to learn care for patient? YES What is the highest level of education completed? Learning preference (written material, demonstration, visual)? Learning barriers (ESOL, [place], poor vision)? 
  
CLINICAL INFORMATION  
  
   
Wt Readings from Last 3 Encounters:  
10/19/19 61.5 kg (135 lb 9.6 oz) 09/09/19 61.8 kg (136 lb 3.2 oz) 07/02/15 63.6 kg (140 lb 3.2 oz)  
  Ht Readings from Last 3 Encounters:  
10/14/19 5' 10\" (1.778 m) 09/04/19 5' 10\" (1.778 m) 07/02/15 5' 6\" (1.676 m)  
  There is no height or weight on file to calculate BMI.  There were no vitals taken for this visit. 
  
LAB VALUES No results found for this visit on 10/19/19 (from the past 12 hour(s)). No results found for this visit on 10/19/19 (from the past 6 hour(s)). Lab Results Component Value Date/Time  
  Protein, total 6.6 10/06/2019 05:29 PM  
  Albumin 2.7 (L) 10/06/2019 05:29 PM  
  
  
Currently this patient has: 
[x] Supplemental O2       [] Peripheral IV  [x] PICC               [] PORT [x] Lucero Catheter           [] NG Tube                    [] PEG Tube      [] Ostomy             
[] AICD: Has ICD been deactivated? [] Yes      [] No:______ 
  
PLAN  
  
1. Education of patient, family and staff re end of life care 2. Provide support and frequent rounds for patient comfort and safety 3. Schedule IV medications for pain 4. Schedule IV medications for restlessness and agitation 5. Schedule IV medications for respiratory distress 6. Provide IV PRN for symptom management. 7. Lucero care per hospital policy 8. PICC line care per hospital policy 9.  Contact PCP on Monday to offer if PCP wants to follow patient while on hospice care per family request. 
  
 Hospice Team Frequency Orders: 
Skilled Nurse -  Daily x 7 days /every other day x 7 days  with 5 PRN visits for symptom control. MSW  1 visit for initial assessment/evaluation for family support and need for volunteer services. Gerhard Zepeda  1 visit for initial assessment/evaluation for spiritual support.  
  
ADVANCE CARE PLANNING (Complete in ACP Flow Sheet) Code Status: DNR Durable DNR: [x]  Yes  Signed by MPOA and on chart for MD signature. Code Status Discussed/Confirmed: Yes Preference for Other Life Sustaining Treatment Discussed/Confirmed: Yes Hospitalization Preference: Patient would like to receive end of life care in the hospital, and if stabilizes, return home with hospice support Advance Care Planning 10/7/2019 Confirm Advance Directive None  
  
  
 Service: [] Yes            []  No           [x] Unknown Appropriate for Pinning Ceremony:  [] Yes     [] No 
Tenriism: NO Anabaptism  Home: [place] 
  
DISCHARGE PLANNING  
  
1. Discharge Plan: If patient stabilizes, family would like to consider taking him home with hospice assistance. Will also consider [facility]. 2. Patient/Family teaching: Juan Wall and nickyw second daughter, Foreign Ghosh available for bedside education, safety review and to review hospice plan of care 3. Response to patient/family teaching: Accepting of plan of care. 
  
SOCIAL/EMOTIONAL/SPIRITUAL NEEDS  
  
Spiritual Issues Identified: None identified 
  
Psych/ Social/ Emotional Issues Identified: PUSHPA, and second daughter both live in [place]. Caridad Eldridge, is emotional and states that she feels that her father wanted to be at home with hospice care at the end of his life, like his wife did, 20 years ago.  Support offered, and plan to review home situation with family daily. 
  
Caregiver Support: 
[x] Provided information on End of Life Care  
[x] Material Provided: Gone From My Sight or [facility]  
  
CARE COORDINATION  
 Dr. Johnny Guerra contacted, discharge to hospice order received Dr. Akilah Lopez contacted, agrees to serve as attending provider for hospice and provided verbal certification of terminal illness with life expectancy of 6 months or less. Orders for hospice admission, medications and plan of treatment received. Medication reconciliation completed. MEDS: See medication list below DME: Per hospital 
Supplies: Per hospital 
IDT communication to include MD, SN, SW, CH and support team 
  
ALLERGIES AND MEDICATIONS  
  
Allergies: No Known Allergies Current Facility-Administered Medications Medication Dose Route Frequency  LORazepam (ATIVAN) injection 1 mg  1 mg IntraVENous Q15MIN PRN  
 haloperidol lactate (HALDOL) injection 2 mg  2 mg IntraVENous Q4H PRN  
 acetaminophen (TYLENOL) suppository 650 mg  650 mg Rectal Q4H PRN  
 ketorolac (TORADOL) injection 30 mg  30 mg IntraVENous Q8H PRN  
 scopolamine (TRANSDERM-SCOP) 1 mg over 3 days 1 Patch  1 Patch TransDERmal Q72H PRN  
 glycopyrrolate (ROBINUL) injection 0.2 mg  0.2 mg IntraVENous Q4H PRN  
 bisacodyl (DULCOLAX) suppository 10 mg  10 mg Rectal DAILY PRN  
 morphine injection 2 mg  2 mg IntraVENous Q4H  
 LORazepam (ATIVAN) injection 1 mg  1 mg IntraVENous Q4H  
 morphine injection 1 mg  1 mg IntraVENous Q15MIN PRN  
 sodium chloride (NS) flush 5 mL  5 mL InterCATHeter PRN  
  
  
Ricardo Lee RN, [facility] Hospice Nurse Liaison [phone] [place] [phone] Office

## 2019-10-20 NOTE — [facility] IDG NURSE NOTES
Family request Hospice to contact PCP, Dr. Sarina Dso Santos on Monday[address] [facility] 
[phone] 
  
Inpatient Nursing Admission Patient Name: Jan Rene YOB: 1927 Age: 80 y.o. 
  
  
Date of Hospice Admission: 10/19/2019 Hospice Attending Elected by Patient: Raya Kidd MD 
Primary Care Physician: David Skinner MD 
Admitting RN: Yumiko Cortés RN, [facility] : Not available            
  
Level of Care (GIP/Routine/Respite): GIP Facility of Care: [facility] Patient Room: 5/ 
  
 HOSPICE SUMMARY  
ER Visits/ Hospitalizations in past year: 2 
  
Hospice Diagnosis: Sepsis (POA) secondary to L hip prosthetic joint infection - + staph epi 
  
Onset Date of Hospice Diagnosis: September 2019 Summary of Disease Progression Leading to Hospice Diagnosis:  Patient complains of subjective fever, decreased oral intake  increased weakness after he was discharged from rehab 3 days. PEr chart review ,Dauther reporters ,productive cough or scanty yellowish sputum and difficulty of swallowing for solid food  
  
Co-Morbidities:  
    
Patient Active Problem List  
Diagnosis Code  Fracture of unspecified part of neck of left femur, initial encounter for closed fracture ([facility] [address] S72.002A  Hypertension I10  
 Fall from ground level F72.24AJ  Pneumonia J18.9  Sepsis ([facility] [address] A41. 9  
  
Diagnoses RELATED to the terminal prognosis: Frequent falls, CAD with valvular replacement, HX Coronary Artery Bypass Graft, joint infection, Bilateral atelectasis, Acute CHF exacerbation Other Diagnoses: HTN , CAD,and DDD, metabolic encephalopathy 
  
Rationale for a prognosis of life expectancy of 6 months or less if the disease follows its normal course (Disease Specific History):  
  
Jan Rene is a 80 y.o. who was admitted to [facility].  The patient's principle diagnosis of Sepsis secondary to L hip Prosthetic joint infection has resulted in recurrent hospitalizations and surgical procedures, resulting in infections. Functionally, the patient's Palliative Performance Scale has declined over a period of 2 months and is estimated at 10. . Objective information that support this patients limited prognosis includes: New Pleural Effusions, and increased previous pleural effusion. The patient/family chose comfort measures with the support of Hospice. 
  
Patient meets for GIP LOC as evidenced by Restlessness, pain and respiratory distress 
  
Prognosis estimated based on 10/19/19 clinical assessment is:  
  
[x] Hours to Days  
  
  
ASSESSMENT Patient self-reports:  []  Yes      [x] No  
  
Adult Non-Verbal Pain Assessment Score: 10/10 
  
Face 
[] 0   No particular expression or smile 
[] 1   Occasional grimace, tearing, frowning, wrinkled forehead 
[x] 2   Frequent grimace, tearing, frowning, wrinkled forehead 
  
Activity (movement) 
[] 0   Lying quietly, normal position 
[] 1   Seeking attention through movement or slow, cautious movement [x] 2   Restless, excessive activity and/or withdrawal reflexes 
  
Guarding 
[] 0   Lying quietly, no positioning of hands over areas of body 
[] 1   Splinting areas of the body, tense 
[x] 2   Rigid, stiff 
  
Physiology (vital signs) 
[] 0   Stable vital signs 
[] 1   Change in any of the following: SBP > 20mm Hg; HR > 20/minute [x] 2   Change in any of the following: SBP > 30mm Hg; HR > 25/minute 
  
Respiratory 
[] 0   Baseline RR/SpO2, compliant with ventilator 
[] 1   RR > 10 above baseline, or 5% drop SpO2, mild asynchrony with ventilator [x] 2   RR > 20 above baseline, or 10% drop SpO2, asynchrony with ventilator 
  
  
SYMPTOMS: Pain, respiratory distress with increased respirations, restlessness and agitation.  Pt has been combative over the past 12-24 hours. 
  
SIGNS/PHYSICAL FINDINGS: Pt grimacing, moaning, and restless. 
  
KARNOFSKY: 10 
  
FAST for all dementia:   
   
Learning Assessment: 
Patient Is patient willing/able to learn? NO What is the highest level of education completed? Learning preference (written material, demonstration, visual)? Learning barriers (ESOL, [place], poor vision)? 
  
Caregiver Is caregiver willing to learn care for patient? YES What is the highest level of education completed? Learning preference (written material, demonstration, visual)? Learning barriers (ESOL, [place], poor vision)? 
  
CLINICAL INFORMATION  
  
   
Wt Readings from Last 3 Encounters:  
10/19/19 61.5 kg (135 lb 9.6 oz) 09/09/19 61.8 kg (136 lb 3.2 oz) 07/02/15 63.6 kg (140 lb 3.2 oz)  
  Ht Readings from Last 3 Encounters:  
10/14/19 5' 10\" (1.778 m) 09/04/19 5' 10\" (1.778 m) 07/02/15 5' 6\" (1.676 m)  
  There is no height or weight on file to calculate BMI.  There were no vitals taken for this visit. 
  
LAB VALUES No results found for this visit on 10/19/19 (from the past 12 hour(s)). No results found for this visit on 10/19/19 (from the past 6 hour(s)). Lab Results Component Value Date/Time  
  Protein, total 6.6 10/06/2019 05:29 PM  
  Albumin 2.7 (L) 10/06/2019 05:29 PM  
  
  
Currently this patient has: 
[x] Supplemental O2       [] Peripheral IV  [x] PICC               [] PORT [x] Lucero Catheter           [] NG Tube                    [] PEG Tube      [] Ostomy             
[] AICD: Has ICD been deactivated? [] Yes      [] No:______ 
  
PLAN  
  
1. Education of patient, family and staff re end of life care 2. Provide support and frequent rounds for patient comfort and safety 3. Schedule IV medications for pain 4. Schedule IV medications for restlessness and agitation 5. Schedule IV medications for respiratory distress 6. Provide IV PRN for symptom management. 7. Lucero care per hospital policy 8. PICC line care per hospital policy 9.  Contact PCP on Monday to offer if PCP wants to follow patient while on hospice care per family request. 
  
 Hospice Team Frequency Orders: 
Skilled Nurse -  Daily x 7 days /every other day x 7 days  with 5 PRN visits for symptom control. MSW  1 visit for initial assessment/evaluation for family support and need for volunteer services. Tita Partida  1 visit for initial assessment/evaluation for spiritual support.  
  
ADVANCE CARE PLANNING (Complete in ACP Flow Sheet) Code Status: DNR Durable DNR: [x]  Yes  Signed by MPOA and on chart for MD signature. Code Status Discussed/Confirmed: Yes Preference for Other Life Sustaining Treatment Discussed/Confirmed: Yes Hospitalization Preference: Patient would like to receive end of life care in the hospital, and if stabilizes, return home with hospice support Advance Care Planning 10/7/2019 Confirm Advance Directive None  
  
  
 Service: [] Yes            []  No           [x] Unknown Appropriate for Pinning Ceremony:  [] Yes     [] No 
Roman Catholic: NO Druze  Home: Zul[place] 
  
DISCHARGE PLANNING  
  
1. Discharge Plan: If patient stabilizes, family would like to consider taking him home with hospice assistance. Will also consider [facility]. 2. Patient/Family teaching: Magda Wall and patientsw second daughter, Agustin Dumont available for bedside education, safety review and to review hospice plan of care 3. Response to patient/family teaching: Accepting of plan of care. 
  
SOCIAL/EMOTIONAL/SPIRITUAL NEEDS  
  
Spiritual Issues Identified: None identified 
  
Psych/ Social/ Emotional Issues Identified: PUSHPA, and second daughter both live in [place]. Rich Mccartney, is emotional and states that she feels that her father wanted to be at home with hospice care at the end of his life, like his wife did, 20 years ago.  Support offered, and plan to review home situation with family daily. 
  
Caregiver Support: 
[x] Provided information on End of Life Care  
[x] Material Provided: Gone From My Sight or [facility]  
  
CARE COORDINATION  
 Dr. Ty Li contacted, discharge to hospice order received Dr. Sindy Lopez contacted, agrees to serve as attending provider for hospice and provided verbal certification of terminal illness with life expectancy of 6 months or less. Orders for hospice admission, medications and plan of treatment received. Medication reconciliation completed. MEDS: See medication list below DME: Per hospital 
Supplies: Per hospital 
IDT communication to include MD, , SW, CH and support team 
  
ALLERGIES AND MEDICATIONS  
  
Allergies: No Known Allergies Current Facility-Administered Medications Medication Dose Route Frequency  LORazepam (ATIVAN) injection 1 mg  1 mg IntraVENous Q15MIN PRN  
 haloperidol lactate (HALDOL) injection 2 mg  2 mg IntraVENous Q4H PRN  
 acetaminophen (TYLENOL) suppository 650 mg  650 mg Rectal Q4H PRN  
 ketorolac (TORADOL) injection 30 mg  30 mg IntraVENous Q8H PRN  
 scopolamine (TRANSDERM-SCOP) 1 mg over 3 days 1 Patch  1 Patch TransDERmal Q72H PRN  
 glycopyrrolate (ROBINUL) injection 0.2 mg  0.2 mg IntraVENous Q4H PRN  
 bisacodyl (DULCOLAX) suppository 10 mg  10 mg Rectal DAILY PRN  
 morphine injection 2 mg  2 mg IntraVENous Q4H  
 LORazepam (ATIVAN) injection 1 mg  1 mg IntraVENous Q4H  
 morphine injection 1 mg  1 mg IntraVENous Q15MIN PRN  
 sodium chloride (NS) flush 5 mL  5 mL InterCATHeter PRN  
  
  
Dustin Glasgow RN, [facility] Hospice Nurse Liaison [phone] Mobile [phone] Office

## 2019-10-20 NOTE — H&P
[employer] [facility] 
[phone] Patient Name: Diana Thurman YOB: 1927 Date of Provider Hospice Visit: 10/20/19 Level of Care:   [x] General Inpatient (GIP)    [] Routine   [] Respite Current Location of Care: 
[x] [facility] [] [facility] [] [facility] [] [facility] [] [facility], patient referred from: 
[] [facility] [] [facility] [] [facility] [] [facility] [] Home [] Other:  
 
Date of Original Hospice Admission: 10/19/19 Hospice Medical Director at time of admission: Dr Nolberto Millard Principle Hospice Diagnosis: Sepsis Diagnoses RELATED to the terminal prognosis: sepsis due to pseudomonas, Frequent falls, CAD with valvular replacement, HX Coronary Artery Bypass Graft, joint infection, Bilateral atelectasis, Acute CHF exacerbation, HTN , CAD,and DDD, metabolic encephalopathy Antonia Cohen is a 80 y. o. who was admitted to [facility]. The patient's principle diagnosis of Sepsis secondary to L hip Prosthetic joint infection has resulted in recurrent hospitalizations and surgical procedures, resulting in infections.  Functionally, the patient's Palliative Performance Scale has declined over a period of 2 months and is estimated at 10. . Objective information that support this patients limited prognosis includes: New Pleural Effusions, and increased previous pleural effusion. The patient/family chose comfort measures with the support of Hospice. The patient's principle diagnosis has resulted in weakness, debility, fatigue, shortness of breath, pain, anxiety, agitation, delirium, confusion. Functionally, the patient's Karnofsky and/or Palliative Performance Scale has declined over a period of days to weeks and is estimated at 10%. The patient is dependent on the following ADLs: pt is completely dependent for all ADLs. Objective information that support this patients limited prognosis includes:  HOSPICE DIAGNOSES  
 Active Symptoms: 1. Severe joint pain secondary to left hip prosthetic joint infection and sepsis 2. Terminal agitation and restlessness 3. Hyperthermia r/t sepsis 4. Excessive upper airway secretions 5. Shortness of breath with respiratory distress PLAN 1. Admit to GIP  level of care for overwhelming symptoms, transitions of care, high risk for decline, this care cannot be provided in the home, the need for IV medications, frequent assessments are required by the medical team. 
2. Medications include the following:lorazepam 1mg IV q 4 hrs and q 15 min as needed, continue morphine 2 mg IV q 4 hrs and every 15 min as needed, continue robinul scheduled and Toradol as needed 3. Continue comfort care 4.  and SW to support family needs 5. Disposition: to routine level of care once symptoms resolve Prognosis estimated based on 10/20/19 clinical assessment is:  
[x] Hours to Days   
[] Days to Weeks   
[] Other: 
 
Communicated plan of care with: Hospice Case Manager; Hospice IDT; Care Team 
 
 GOALS OF CARE Patient/Medical POA stated Goal of Care: comfort care 
 
[x] I have reviewed and/or updated ACP information in the Advance Care Planning Navigator. This information is available in the [facility] [address] link in the patient's chart header. Primary Decision [facility] Hospital Agent):   Primary Decision Maker: Navid Goldberg - [phone] Primary Decision Maker: Alexandria Page - [phone] Resuscitation Status: DNR If DNR is there a Durable DNR on file? : [x] Yes [] No (If no, complete Durable DNR) HISTORY History obtained from: chart, SN, CM, family, patient CHIEF COMPLAINT:   
The patient is:  
[] Verbal 
[] Nonverbal 
[x] Unresponsive HPI/SUBJECTIVE: 80 year old male doing poorly, poorly responsive, developed pseudomonas sepsis and is declining rapidly REVIEW OF SYSTEMS The following systems were: [] reviewed  [x] unable to be reviewed Positive ROS include: 
Constitutional: fatigue, weakness, in pain, short of breath Ears/nose/mouth/throat: increased airway secretions Respiratory:shortness of breath, wheezing Gastrointestinal:poor appetite, nausea, vomiting, abdominal pain, constipation, diarrhea Musculoskeletal:pain, deformities, swelling legs Neurologic:confusion, hallucinations, weakness Psychiatric:anxiety, feeling depressed, poor sleep Endocrine:  
  
Adult Non-Verbal Pain Assessment Score: 10/10 
  
Face 
[] 0   No particular expression or smile 
[] 1   Occasional grimace, tearing, frowning, wrinkled forehead 
[x] 2   Frequent grimace, tearing, frowning, wrinkled forehead 
  
Activity (movement) 
[] 0   Lying quietly, normal position 
[] 1   Seeking attention through movement or slow, cautious movement [x] 2   Restless, excessive activity and/or withdrawal reflexes 
  
Guarding 
[] 0   Lying quietly, no positioning of hands over areas of body 
[] 1   Splinting areas of the body, tense 
[x] 2   Rigid, stiff 
  
Physiology (vital signs) 
[] 0   Stable vital signs 
[] 1   Change in any of the following: SBP > 20mm Hg; HR > 20/minute [x] 2   Change in any of the following: SBP > 30mm Hg; HR > 25/minute 
  
Respiratory 
[] 0   Baseline RR/SpO2, compliant with ventilator 
[] 1   RR > 10 above baseline, or 5% drop SpO2, mild asynchrony with ventilator [x] 2   RR > 20 above baseline, or 10% drop SpO2, asynchrony with ventilator FUNCTIONAL ASSESSMENT Palliative Performance Scale (PPS): 10% PSYCHOSOCIAL/SPIRITUAL ASSESSMENT Active Problems: 
  Sepsis ([facility] [address] (10/19/2019) Past Medical History:  
Diagnosis Date  CAD (coronary artery disease)  DDD (degenerative disc disease), lumbar  Sciatica Past Surgical History:  
Procedure Laterality Date  HX CORONARY ARTERY BYPASS GRAFT Social History Tobacco Use  Smoking status: Never Smoker  Smokeless tobacco: Never Used Substance Use Topics  Alcohol use: Yes No family history on file. No Known Allergies Current Facility-Administered Medications Medication Dose Route Frequency  glycopyrrolate (ROBINUL) injection 0.2 mg  0.2 mg IntraVENous Q4H  
 LORazepam (ATIVAN) injection 1 mg  1 mg IntraVENous Q15MIN PRN  
 haloperidol lactate (HALDOL) injection 2 mg  2 mg IntraVENous Q4H PRN  
 acetaminophen (TYLENOL) suppository 650 mg  650 mg Rectal Q4H PRN  
 ketorolac (TORADOL) injection 30 mg  30 mg IntraVENous Q8H PRN  
 scopolamine (TRANSDERM-SCOP) 1 mg over 3 days 1 Patch  1 Patch TransDERmal Q72H PRN  
 glycopyrrolate (ROBINUL) injection 0.2 mg  0.2 mg IntraVENous Q4H PRN  
 bisacodyl (DULCOLAX) suppository 10 mg  10 mg Rectal DAILY PRN  
 morphine injection 2 mg  2 mg IntraVENous Q4H  
 LORazepam (ATIVAN) injection 1 mg  1 mg IntraVENous Q4H  
 morphine injection 1 mg  1 mg IntraVENous Q15MIN PRN  
 sodium chloride (NS) flush 5 mL  5 mL InterCATHeter PRN PHYSICAL EXAM  
 
Wt Readings from Last 3 Encounters:  
10/19/19 61.5 kg (135 lb 9.6 oz) 09/09/19 61.8 kg (136 lb 3.2 oz) 07/02/15 63.6 kg (140 lb 3.2 oz) Visit Vitals BP 97/58 (BP 1 Location: Right arm, BP Patient Position: At rest) Pulse 80 Temp 97.4 °F (36.3 °C) Resp 22 SpO2 93% Supplemental O2  [x] Yes  [] NO Last bowel movement: PTA Currently this patient has: 
[x] Peripheral IV [] PICC  [] PORT [] ICD [x] Lucero Catheter [] NG Tube   [] PEG Tube   
[] Rectal Tube [] Drain 
[] Other:  
 
Constitutional: minimally responsive Eyes: pupils equal, anicteric ENMT: increased airway secretions, moist mucous membranes Cardiovascular: regular rhythm, distal pulses intact Respiratory: breathing not labored, symmetric Gastrointestinal: soft non-tender, +bowel sounds Musculoskeletal: no deformity, no tenderness to palpation Skin: warm, dry Neurologic:pt is/ not able to follow commands, pt is/ not moving all extremities Psychiatric: NA 
 
 
 
Pertinent Lab and or Imaging Tests: 
Lab Results Component Value Date/Time Sodium 142 10/15/2019 03:37 AM  
 Potassium 3.9 10/15/2019 03:37 AM  
 Chloride 109 (H) 10/15/2019 03:37 AM  
 CO2 30 10/15/2019 03:37 AM  
 Anion gap 3 (L) 10/15/2019 03:37 AM  
 Glucose 108 (H) 10/15/2019 03:37 AM  
 BUN 23 (H) 10/15/2019 03:37 AM  
 Creatinine 1.34 (H) 10/15/2019 03:37 AM  
 BUN/Creatinine ratio 17 10/15/2019 03:37 AM  
 GFR est AA >60 10/15/2019 03:37 AM  
 GFR est non-AA 50 (L) 10/15/2019 03:37 AM  
 Calcium 9.4 10/15/2019 03:37 AM  
 
Lab Results Component Value Date/Time  Protein, total 6.6 10/06/2019 05:29 PM  
 Albumin 2.7 (L) 10/06/2019 05:29 PM  
 
   
 
  
Antwon Salas NP

## 2019-10-20 NOTE — HOSPICE
[facility] Good Help to Those in Need 
[phone] GIP Daily Nursing Note Patient Name: Sandra Norman YOB: 1927 Age: 80 y.o. Date of Visit: 10/20/19 Facility of Care: [facility] Patient Room: [facility] Hospice Attending: Casandra Vail MD 
Hospice Diagnosis: Sepsis [facility]) [A41.9] Level of Care: GIP Current GIP Symptoms 1. Severe joint pain secondary to left hip prosthetic joint infection and sepsis 2. Terminal agitation 3. Hyperthermia r/t sepsis 4. Excessive upper airway secretions 5. Shortness of breath ASSESSMENT & PLAN Must update Plan of Care including visit frequencies for IDT members 1. Continue with scheduled morphine 2 mg IVP q4hrs for pain/SOB; continue with 1 mg morphine 1 mg IVP [id number] prn for breakthrough pain 2. Continue with scheduled ativan 1 mg IVP q4hrs for terminal agitation; utilize ativan 1 mg IVP [id number] prn for breakthrough agitation 3. Utilize scopolamine patch 1 mg transdermal [id number] prn and robinul 0.2 mg IVP q4hrs prn for excessive upper airway secretions; start scheduled robinul 0.2 mg q4hrs IVP due to excessive secretions upon assessment this morning 4. Utilize toradol 30 mg IVP q8hrs prn, acetaminophen 650 mg NM q4hrs prn, and cool wash clothes to manage fevers 4. Hospice IDT to continue to coordinate care Spiritual Interventions: patient doesn't self identify with any aurbee tradition; hospice chaplain to make initial assessment Psych/ Social/ Emotional Interventions: MPOA daughter Savi Coleman and daughter Kai Vinson at bedside; hospice MSW to make initial assessment Care Coordination Needs: hospice RN, hospice MSW, hospice MD, hospice chaplain, and unit nursing staff (bedside RN-Jeanine RN) continue to coordinate care Care plan and New Orders discussed / approved with Vaughn Mcarthur MD. Description History and Chart Review If this is initial GIP note must document RN assessment/MD communication in previous setting. Specifically document nursing/medication needs in last 24 hours to support GIP care Narrative History of last 24 hours that demonstrates care cannot be provided in another setting: 
Since hospice admission, this patient has required scheduled IVP opioids and benzodiazepines q4hrs for symptom management; in addition patient now requiring scheduled anticholinergics q4hrs for symptom management; in addition patient requiring multiple PRN doses of antipyretics/NSAIDs to control fever What has been done to control the patient's symptoms in the last 24 hours? Scheduled morphine 2 mg IVP q4hrs; scheduled ativan 1 mg IVP q4hrs; scheduled robinul 0.2 mg IVP q4hrs; acetaminophen supp 650 mg PRN q4hrs prn and toradol 30 mg IVP q8hrs prn for hyperthermia Does the patient currently require IV medications? Yes Does the patient currently require scheduled medications? Yes Does the patient currently require a PCA? No 
 
List number of doses of PRN medications in last 24 hours: 
Medication 1: acetaminophen 650 mg ND q4hrs Number of doses: 1 Medication 2: robinul 0.2 mg IVP q4hrs Number of doses: 2 Medication 3: toradol 30 mg IVP q8hrs Number of doses: Supporting documentation for GIP need for pain control: 
[x] Frequent evaluation by a doctor, nurse practitioner, nurse  
[x] Frequent medication adjustment   
[x] IVs that cannot be administered at home  
[] Aggressive pain management  
[x] Complicated technical delivery of medications Supporting documentation for GIP need for symptom control: 
[x]  Sudden decline necessitating intensive nursing intervention 
[]  Uncontrolled / intractable nausea or vomiting  
[]  Pathological fractures 
[]  Advanced open wounds requiring frequent skilled care [x] Unmanageable respiratory distress 
[] New or worsening delirium  
[] Delirium with behavior issues: Is 24 hour caregiver present due to safety concerns with agitation? (yes/no) [x] Imminent death  with skilled nursing needs documented above DISCHARGE PLANNING Daily discharge planning required for GIP 1. Discharge Plan: home with hospice should patient's condition stabilize and their symptoms become manageable; unfortunately patient not stable for transport at this time as death appears imminent 2. Patient/Family teaching: family educated about symptom management and end of life care 3. Response to patient/family teaching: family verbalized understanding of teaching ASSESSMENT   
KARNOFSKY: 10% Prognosis estimated based on 10/20/19 clinical assessment is:  
[] Few to Many Hours [x] Hours to Days  
[] Few to Many Days  
[] Days to Weeks  
[] Few to Many Weeks  
[] Weeks to Months  
[] Few to Many Months Quality Measure: Patient self-reports:  [] Yes    [x] No 
 
ESAS:  
Time of Assessment: 10:30 Pain (1-10):2 Fatigue (1-10): 5 Shortness of breath (1-10):5 Nausea (1-10): Appetite (1-10): Anxiety: (1-10): Depression: (1-10): Well-being: (1-10): Constipation: _ Yes  _ No 
LAST BM: 
 
CLINICAL INFORMATION Patient Vitals for the past 12 hrs: 
 Temp Pulse Resp BP SpO2  
10/20/19 0827 97.4 °F (36.3 °C) 80 22 97/58 93 % Currently this patient has: 
[x] Supplemental O2 [x] IV   
[] PICC [] PORT  
[] NG Tube   
[] PEG Tube  
[] Ostomy    
[x] Lucero draining cloudy sediment urine 
[] Other:  
 
SIGNS/PHYSICAL FINDINGS Skin (including wound): 
[] Warm, dry, supple, intact and color normal for race [x] Warm  
[] Dry  
[] Cool [x] Clammy      
[] Diaphoretic Turgor 
 [] Normal 
 [x] Decreased Color: 
 [] Pink [x] Pale 
 [] Cyanotic 
 [] Erythema 
 [] Jaundice [] Normal for Race 
[x]  Wounds: vasolex to heel and sacrum BID for comfort Neuro: 
[] Lethargy 
[x] Restlessness / agitation 
[] Confusion / delirium 
[] Hallucinations 
[] Responds to maximal stimulation 
[x] Unresponsive 
[] Seizures Cardiac: 
[] Dyspnea on Exertion 
[] JVD [] Murmur 
[] Palpitations [x] Hypotension 
[] Hypertension 
[] Tachycardia [] Bradycardia 
[] Irregular HR [x] Pulses Decreased 
[] Pulses Absent 
[] Edema:       
[] Mottling:      
 
Respiratory: 
Breath sounds:  
 [x] Diminished 
 [] Wheeze [x] Rhonchi 
 [] Rales  
[] Even and unlabored 
[x] Labored: RR 20-25 [x] Cough [x] Non Productive 
 [] Productive 
  [] Description:          
[] Deep suctioned  
[x] O2 at ___ LPM 
[] High flow oxygen greater than 10 LPM 
[] Bi-Pap GI [x] Abdomen (soft, non-tender, non-distended) [] Ascites 
[] Nausea 
[] Vomiting 
[] Incontinent of bowels [x] Bowel sounds (Hypoactive) [] Diarrhea 
[] Constipation (see above including last bowel movement) [] Checked for impaction 
[] Last BM Nutrition Diet: NPO Appetite:  
[] Good  
[] Fair  
[x] Poor  
[] Tube Feeding  
[] Voiding 
[] Incontinent [x] Lucero Musculoskeletal 
[] Balance/Milan Unsteady [x] Weak Strength:  
 [] Normal  
 [] Limited [x] Decreasing Activities:  
 [] Up as tolerated 
 [x] Bedridden  
 [] Specify: 
 
SAFETY [x] 24 hr. Caregiver [x] Side rails ? [x] Hospital bed  
[x] Reviewed Falls & Safety ALLERGIES AND MEDICATIONS Allergies: No Known Allergies Current Facility-Administered Medications Medication Dose Route Frequency  LORazepam (ATIVAN) injection 1 mg  1 mg IntraVENous Q15MIN PRN  
 haloperidol lactate (HALDOL) injection 2 mg  2 mg IntraVENous Q4H PRN  
 acetaminophen (TYLENOL) suppository 650 mg  650 mg Rectal Q4H PRN  
 ketorolac (TORADOL) injection 30 mg  30 mg IntraVENous Q8H PRN  
 scopolamine (TRANSDERM-SCOP) 1 mg over 3 days 1 Patch  1 Patch TransDERmal Q72H PRN  
 glycopyrrolate (ROBINUL) injection 0.2 mg  0.2 mg IntraVENous Q4H PRN  
 bisacodyl (DULCOLAX) suppository 10 mg  10 mg Rectal DAILY PRN  
 morphine injection 2 mg  2 mg IntraVENous Q4H  
 LORazepam (ATIVAN) injection 1 mg  1 mg IntraVENous Q4H  
  morphine injection 1 mg  1 mg IntraVENous Q15MIN PRN  
 sodium chloride (NS) flush 5 mL  5 mL InterCATHeter PRN Visit Time In: 10:00 Visit Time Out: 11:00

## 2019-10-21 NOTE — HOSPICE
Called Dr. Fanny Rascon [facility] office and left a message with his service that patient was admitted to inpatient hospice care on 10/19/2019. DX of Sepsis secondary to hip prosthetic joint infection. Currently being followed by hospice medical director. Message given to service that patients daughters were both bedside and wanted Dr. Lane Hernandez to be aware that patient was now under hospice service, and if Dr. Rocky Diamond wanted to follow this patient under hospice care to please call this nurse back and verify. If Dr. Lane Hernandez did not care to follow patient in hospice care, that hospice medical director, Clair Aguilera MD would follow patient. Office service to provide Dr. Lane Hernandez with this message and to call this Boris Lawton back with verification. Thank you! Kira Merrill RN, [facility] Nurse Liaison [phone] Mobile [phone] Office

## 2019-10-21 NOTE — HOSPICE
[facility] Good Help to Those in Need 
[phone] Social Work Admission Note Patient Name: Donna Deluna YOB: 1927 Age: 80 y.o. Date of Visit: 10/21/19 Facility of Care: [facility] Patient Room: 615/01 Hospice Attending: Mckenna Ag MD 
Hospice Diagnosis: Sepsis [facility]) [A41.9] Level of Care:  
 [x]  GIP []  Respite 
 []  Routine NARRATIVE This LCSW met with pt, who is unresponsive, and his daughters Umair Taylor and Carrie Jade. Pt is a 81 y/o CM with a hospice diagnosis of sepsis. Pt had recent hip surgery, was in rehab, came home for 3 days and was admitted to [facility] for infection. Pt has comorbid hx of falls, terminal agitation, HTN, CAD, DDD, HCAP, PE, dysphagia, and metabolic encephalopathy. Pt is  and has the 2 daughters mentioned above. Pt is an [employer]  and retired from [employer]. Pt was living independently prior to his hip surgery. Pt enjoyed yard work, gardening and keeping busy. LCSW provided support for daughters through reflective listening as daughter talked about pts life, independence, and decline. Daughter Iwona Love was tearful as she talked about her father and being at the bedside through his surgery, infection, and now terminal prognosis. LCSW provided support as daughter processed limited choices for pt once surgery was recommended. Daughter reports pt was very independent and there was no other choice, but surgery to help him regain his independence and QOL. LCSW affirmed choices made and medical team doing everything they could to help pt. LCSW provided supportive counseling for daughter in coping with pts imminence due to sepsis and multiple comorbidites. LCSW and family discuss pts EOL. LCSW normalized secretions and minimal responsiveness. LCSW encouraged family to reminisce at the bedside. LCSW and family dicussed resources.  LCSW shared information re [place] and [place]. Low BR risk for both Dhara Nova and Yazan Suh. ADVANCE CARE PLANNING Code Status: DNR Durable DNR: _ Yes  X_ No 
Advance Care Planning 10/7/2019 Confirm Advance Directive None Relationship Status: 
[]  Single    
[]       
[]     
[]  Domestic Partner    
[x]  / 
[]  Common Law 
[]   
[]  Unknown If in a relationship, name of partner/spouse: 
Duration of relationship: 
 
Quaker: [address]: [address] Resources Provided: [employer] Social Work Initial Assessment Gender: 
male Race/Ethnicity: (tiffani all that apply) []  [place] or [place] Native 
[]   
[]  Black or [place] American 
[]   or  
[]   or Jaleel 
[x]  Deidra Mendez 
[]  Unknown 
  
 Service:   
[x]  Yes  
[]  No      
[]  Unknown Appropriate for Pinning Ceremony:  
[]  Yes     
[x]  No 
Is patient using VA benefits? []  Yes     
[]  No 
  
Primary Language: English  
[]   Needed 
[]   utilized during visit Ability to express thoughts/needs/feelings 
[]  Expressed thoughts/feelings/needs without difficulty 
[]  Requires extra time and cuing 
[]  Speech limited single words 
[]  Uses only gestures (eye, blinking eye or head movement/pointing) []  Unable to express thoughts/feelings/needs (speech unintelligible or inappropriate) []  Unresponsive Notes:  
  
Mental Status: 
[]  Alert-oriented to:   
 []  Person   
 []  Place   
 []  Time 
[]  Comatose-responds to:  
 []   Verbal stimuli  
 []  Tactile stimuli  
 []  Painful stimuli 
[]  Forgetful 
[]  Disoriented/Confused 
[]  Lethargic 
[]  Agitated 
[x]  Other (specify): Unresponsive Notes:  
  
Patients description of Illness/Current Health Status:   
[]  Patient unable to discuss 
[]  Patient unwilling to discuss [x]  (Specify)Unresponsive Knowledge/Understanding of Disease Process Patient:  
 []  Demonstrates knowledge/understanding of disease process 
 []  Demonstrates knowledge/understanding of treatment plan 
 []  Demonstrates knowledge/understanding of prognosis []  Demonstrates acceptance of prognosis []  Demonstrates knowledge/understanding of resuscitation status [x]  Other (specify) Unresponsive Caregiver: 
 [x]  Demonstrates knowledge/understanding of disease process [x]  Demonstrates knowledge/understanding of treatment plan 
 [x]  Demonstrates knowledge/understanding of prognosis [x]  Demonstrates acceptance of prognosis [x]  Demonstrates knowledge/understanding of resuscitation status 
 []  Other (specify) Notes:  
  
Patients living arrangement/care setting: 
Use the PRIOR COLUMN when the PATIENTS current health status necessitated a change in his/her primary residence. Prior Current Response [x]             []    Patients own home/residence []             []    Home of family member/friend []             []    Boarding home  
           []             []    Assisted living facility/correction center []             []    Hospital/Acute care facility []             []    Skilled nursing facility []             []    Long term care facility/Nursing home  
           []             [x]    Hospice in Patient Primary Caregiver: 
[]  No Primary Caregiver Name of Primary Caregiver: Staci Calle Relationship or Primary Caregiver:  
 []  Spouse/Significant other     
 [x]  Natural Child      
 []  Step child     
 []  Sibling 
 []  Parent 
 []  Friend/Neighbor 
 []  Community/Sabianist Volunteer 
 []  Paid help 
 []  Other (specify):___________ Notes:    
  
Family members/Significant others: 
Jose Antonio Nowak Relationship: dtr Phone Number: [phone] Actively involved in care? [x]  Yes  []  No 
 
Name: Dereck Dobson Relationship: dtr Phone Number: [phone] Actively involved in care? [x]  Yes  []  No 
 
Name: 
Relationship: 
Phone Number: Actively involved in care? []  Yes  []  No 
 
Social support systems: (select ONE best description) [x]  Excellent social support system which includes three or more family members or friends 
[]  Good social support system which includes two or less members or friends 
[]  [address] support which includes one family member or friend 
[]  Poor social support; no family members or friends; basically ALONE Notes:  
  
Emotional Status: (tiffani all that apply) Patient Caregiver Response [x]                [x]    Mood/Affect stable and appropriate    
              []                []    Angry  
              []                []    Anxious []                []    Apprehensive []                []    Avoidant  
              []                []    Clinging  
              []                []    Depressed  
              []                []    Distraught  
              []                []    Elated []                []    Euphoric  
              []                []    Fearful  
              []                []    Flat Affect  
              []                []    Helpless []                []    Hostile []                []    Impulsive []                []    Irritable  
              []                []    Labile  
              []                []    Manic  
              []                []    Restlessness []                [x]    Sad  
              []                []    Suspicious []                [x]    Tearful  
              []                []    Withdrawn Notes:  
 
Coping Skills (strengths/weakness):  
 Patient: Coping Skills (strength/weakness): Unresponsive  Family/caregiver (strength/weakness): 
  
Heyworth of care (tiffani all that apply):    
 [x]  No burden evident  
[]  Family must administer medications  
[]  Illness causing financial strain  
[]  Family/Support feels overwhelmed  
[]  Family/Support sleep disturbed with patients care  
[]  Patients care causes extra physical stress  of death 
[]  Illness causes changes in family lifestyle 
[]  Illness impacting family/support employment 
[]  Family experiencing increased time demands 
[]  Patients behavior endangers family 
[]  Denial of patients illness 
[]  Concern over outcome of illness/fear 
[]  Patients behavior embarrassing to family Notes:  
  
Risk Factors: (tiffani all that apply):   
[x]  No burden evident  
[]  Alcohol abuse 
[]  Financial resources inadequate to meet basic needs (food/house/etc) []  Financial resources inadequate to meet health care needs (supplies/equipment/medications) 
[]  Food/nutrition resources inadequate 
[]  Home environment unsafe/inadequate for home care 
[]  Homicidal risk 
[]  Lives alone or without concerned relatives 
[]  Multiple medications/complex schedule 
[]  Physical limitations increase likelihood of falls 
[]  Plan of care/treatments complicated 
[]  Substance use/abuse 
[]  Suicidal risk 
[]  Visual impairment threatens safety/ability to perform self-care 
[]  Other (specify): 
  
Abuse/Neglect (actual/potential risks): 
[x]  No signs of abuse/neglect 
[]  History of abuse/neglect                 []  [id number]          []  Sexual 
[]  History of domestic violence 
[]  Lacks adequate physical care 
[]  Lacks emotional nurturing/support 
[]  Lacks appropriate stimulation/cognitive experiences 
[]  Left alone inappropriately 
[]  Lacks necessary supervision 
[]  Inadequate or delayed medical care 
[]  Unsafe environment (i.e guns/drug use/history of violence in the home/etc.) []  Bruising or other physical signs of injury present 
[]  Other (specify): 
Notes:  
[]  Refer to child/adult protective services Current Sources of Stress (in Addition to Current Illness):  
[x]  None reported 
[]  Bills/Debt   
[]  Career/Job change   
[]   (short term)   
[]   (long term)   
[]  Death of a child (recent)   
[]  Death of a parent (recent)  
[]  Death of a spouse (recent)  
[]  Employment status changed  
[]  Family discord   
[]  Financial loss/Inadequate inther (specify):come 
[]  Job loss 
[]  Legal issues unresolved 
[]  Lifestyle change 
[]  Marital discord 
[]  Marriage within the last year 
[]  Paperwork (insurance/legal/etc) overwhelming 
[]  Separation/Divorce 
[]  Other (specify): 
Notes:  
  
Current Community Resources Being Utilized 1. Interventions/Plan of Care 1. Assess social and emotional factors related to coping with end of life issues 2. Community resource planning/referral  
3. Relocation to different care setting if/when symptoms stabilize 4. Discharge Planning 1. Will likely pass at [facility] [place] 
 
MSW Assessment Completed by: Mary Carmen Bazan 10/21/19 Time In:2;00 pm      
Time Out:4;00 pm

## 2019-10-21 NOTE — HOSPICE
[facility] Good Help to Those in Need 
[phone] GIP Daily Nursing Note Patient Name: Kolby Lara YOB: 1927 Age: 80 y.o. Date of Visit: 10/21/19 Facility of Care: [facility] Patient Room: 615/ Hospice Attending: Amee Bishop MD 
Hospice Diagnosis: Sepsis [facility]) [A41.9] Level of Care: GIP Current GIP Symptoms 1. Pain 2. Restlessness 3. Respiratory distress ASSESSMENT & PLAN 1. Education of patient, family and staff re end of life care 2. Provide support and frequent rounds for patient comfort and safety 3. Continue with IV Morphine. Increase IV morphine from 2mg Q4 scheduled to 2mg Q 3 hours 4. Continue with IV Ativan. Increase from 1mg Q4 hours to 1`mg Q 3 hours. 5. Continue Robinul IV. Increase from 0.2mg Q 4 hours to 0.2mg Q 3 hours. 6. Reviewed these changes with staff RN. Educated staff to use PRN dosing in between scheduled IV doses as needed for comfort. Spiritual Interventions: None noted Psych/ Social/ Emotional Interventions: Daughters have remained bedside over this weekend. Support offered that daughters take time to return home, take showers, and get a meal together. Reviewed with with Krystal Jalloh RN. Care Coordination Needs: Butch Tanner LCSW meeting with patient and 2 daughters today. Care plan and New Orders discussed / approved with Grecia Tsang MD. Description History and Chart Review If this is initial GIP note must document RN assessment/MD communication in previous setting. Specifically document nursing/medication needs in last 24 hours to support GIP care Narrative History of last 24 hours that demonstrates care cannot be provided in another setting: 
Scheduled dosing IV of morphine, robinul and lorazepam. Lucero care, frequent assessment of comfort and symptom management. PICC line management to prevent infection What has been done to control the patient's symptoms in the last 24 hours? Same as above Does the patient currently require IV medications? Yes Does the patient currently require scheduled medications? Yes Does the patient currently require a PCA? No 
 
List number of doses of PRN medications in last 24 hours: 
Medication 1: 
Number of doses: 
 
Medication 2:  
Number of doses: 
 
Medication 3:  
Number of doses: Supporting documentation for GIP need for pain control: 
[x] Frequent evaluation by a doctor, nurse practitioner, nurse  
[x] Frequent medication adjustment   
[x] IVs that cannot be administered at home  
[x] Aggressive pain management  
[] Complicated technical delivery of medications Supporting documentation for GIP need for symptom control: 
[x]  Sudden decline necessitating intensive nursing intervention 
[]  Uncontrolled / intractable nausea or vomiting  
[]  Pathological fractures 
[]  Advanced open wounds requiring frequent skilled care [x] Unmanageable respiratory distress 
[] New or worsening delirium  
[] Delirium with behavior issues: Is 24 hour caregiver present due to safety concerns with agitation? (yes/no) [x] Imminent death  with skilled nursing needs documented above DISCHARGE PLANNING Daily discharge planning required for GIP 1. Discharge Plan: Daughters would like to take patient to his home and provide care for him. 2. Patient/Family teaching: daughters both bedside today 3. Response to patient/family teaching: Requiring support today, and intervention provided by this nurse and Brand Guardian LCSKEVIN ASSESSMENT   
KARNOFSKY: 10 Prognosis estimated based on 10/21/19 clinical assessment is:  
[x] Few to Many Hours Quality Measure: Patient self-reports:  [] Yes    [x] No 
 
Adult Non-Verbal Pain Assessment Score: 9/10 Face 
[] 0   No particular expression or smile 
[] 1   Occasional grimace, tearing, frowning, wrinkled forehead 
[x] 2   Frequent grimace, tearing, frowning, wrinkled forehead Activity (movement) [] 0   Lying quietly, normal position 
[] 1   Seeking attention through movement or slow, cautious movement 
[x] 2   Restless, excessive activity and/or withdrawal reflexes Guarding 
[] 0   Lying quietly, no positioning of hands over areas of body 
[] 1   Splinting areas of the body, tense 
[x] 2   Rigid, stiff Physiology (vital signs) 
[] 0   Stable vital signs [x] 1   Change in any of the following: SBP > 20mm Hg; HR > 20/minute 
[] 2   Change in any of the following: SBP > 30mm Hg; HR > 25/minute Respiratory 
[] 0   Baseline RR/SpO2, compliant with ventilator 
[] 1   RR > 10 above baseline, or 5% drop SpO2, mild asynchrony with ventilator 
[x] 2   RR > 20 above baseline, or 10% drop SpO2, asynchrony with ventilator ESAS:  
Time of Assessment: 15:00 As per facial scale above: 
Pain (1-10):9 Fatigue (1-10): 9 Shortness of breath (1-10):9 Nausea (1-10): 0 Appetite (1-10): Anxiety: (1-10): Depression: (1-10): Well-being: (1-10): Constipation: _ Yes  _ No 
LAST BM: 10/18 CLINICAL INFORMATION Patient Vitals for the past 12 hrs: 
 Temp Pulse Resp BP SpO2  
10/21/19 0947 97.7 °F (36.5 °C) 92 16 171/70 92 % Currently this patient has: 
[x] Supplemental O2  
[] IV [x] PICC [] PORT  
[] NG Tube   
[] PEG Tube  
[] Ostomy    
[x] Lucero draining _dark amber______ urine 
[] Other:  
 
SIGNS/PHYSICAL FINDINGS Skin (including wound): 
[] Warm, dry, supple, intact and color normal for race [x] Warm  
[] Dry  
[] Cool [x] Clammy      
[] Diaphoretic Turgor 
 [] Normal 
 [x] Decreased Color: 
 [] Pink 
 [] Pale 
 [] Cyanotic 
 [] Erythema [x] Jaundice [] Normal for Race 
[]  Wounds: 
 
Neuro: 
[] Lethargy 
[x] Restlessness / agitation 
[] Confusion / delirium 
[] Hallucinations 
[] Responds to maximal stimulation 
[x] Unresponsive 
[] Seizures Cardiac: 
[] Dyspnea on Exertion 
[] JVD [] Murmur 
[] Palpitations [] Hypotension 
[] Hypertension 
[] Tachycardia [] Bradycardia 
[] Irregular HR 
[] Pulses Decreased 
[] Pulses Absent 
[] Edema:       (Location, Grade and Pitting) [] Mottling:      (Location) Respiratory: 
Breath sounds:  
 [] Diminished 
 [] Wheeze [x] Rhonchi 
 [] Rales  
[] Even and unlabored 
[] Labored:           
[x] Cough [x] Non Productive 
 [] Productive [x] Description:       Very week, unable to clear   
[] Deep suctioned  
[x] O2 at __2_ LPM 
[] High flow oxygen greater than 10 LPM 
[] Bi-Pap GI [x] Abdomen (describe) soft 
[] Ascites 
[] Nausea 
[] Vomiting 
[] Incontinent of bowels 
[] Bowel sounds (yes/no) [] Diarrhea 
[] Constipation (see above including last bowel movement) [] Checked for impaction 
[x] Last BM 10/18 Nutrition Diet:__________NPO Appetite:  
[] Good  
[] Fair  
[] Poor  
[] Tube Feeding  
[] Voiding 
[] Incontinent [x] Lucero Musculoskeletal 
[] Balance/Gurdon Unsteady [x] Weak Strength:  
 [] Normal  
 [] Limited [x] Decreasing Activities:  
 [] Up as tolerated 
 [x] Bedridden  
 [] Specify: 
 
SAFETY [x] 24 hr. Caregiver [x] Side rails ? [x] Hospital bed  
[x] Reviewed Falls & Safety ALLERGIES AND MEDICATIONS Allergies: No Known Allergies Current Facility-Administered Medications Medication Dose Route Frequency  morphine injection 2 mg  2 mg IntraVENous Q3H PRN  
 LORazepam (ATIVAN) injection 1 mg  1 mg IntraVENous Q3H  
 glycopyrrolate (ROBINUL) injection 0.2 mg  0.2 mg IntraVENous Q3H  
 LORazepam (ATIVAN) injection 1 mg  1 mg IntraVENous Q15MIN PRN  
 haloperidol lactate (HALDOL) injection 2 mg  2 mg IntraVENous Q4H PRN  
 acetaminophen (TYLENOL) suppository 650 mg  650 mg Rectal Q4H PRN  
 ketorolac (TORADOL) injection 30 mg  30 mg IntraVENous Q8H PRN  
 scopolamine (TRANSDERM-SCOP) 1 mg over 3 days 1 Patch  1 Patch TransDERmal Q72H PRN  
 glycopyrrolate (ROBINUL) injection 0.2 mg  0.2 mg IntraVENous Q4H PRN  
  bisacodyl (DULCOLAX) suppository 10 mg  10 mg Rectal DAILY PRN  
 morphine injection 1 mg  1 mg IntraVENous Q15MIN PRN  
 sodium chloride (NS) flush 5 mL  5 mL InterCATHeter PRN Visit Time In: 15:00 Visit Time Out: 16:18

## 2019-10-21 NOTE — PROGRESS NOTES
NUTRITION 
 
RD PLAN OF CARE:  
 
Pt previously being followed by this service for poor PO intake. Pt now being cared for by Hospice services. Aggressive nutrition intervention is not indicated at this time. Will sign off, but will remain available for any needs or concerns.  
 
Eitan Goins, RD

## 2019-10-22 NOTE — PROGRESS NOTES
requested, patient passed away, good friend/neighbor at bedside. Provided spiritual presence and listening as he spoke of his current thoughts, feelings, and emotions. Spoke of the patient and memories from their life together. Shortly afterward, the patient's daughters, Reza Yates and Miroslava King, arrived expressing their grief and love for their father. Daughter's say that their father wished to be cremated at [facility] on [address]. They will return to [place] until Spring and, then, return to [place] to hold a  service at that time. They requested prayer and a prayer was offered. All appeared comforted as a result of this prayer, visit, and care and expressed gratitude for this visit. Informed them of availability of  and pastoral care services. Will continue to follow up as needed and upon request as able. Visited by Rev. Abigail Allen MDiv, [facility], [facility]  paging service: [phone])

## 2019-10-22 NOTE — PROGRESS NOTES
[facility] Good Help to Those in Need 
[phone] Patient Name: Alicia Head YOB: 1927 Date of Provider Hospice Visit: 10/22/19 Level of Care:   [x] General Inpatient (GIP)    [] Routine   [] Respite Current Location of Care: 
[x] [facility] [] [facility] [] [facility] [] [facility] [] Hospice [facility], patient referred from: 
[] [facility] [] [facility] [] [facility] [] [facility] [] Home [] Other:  
 
Date of Original Hospice Admission: 10/19/19 Hospice Medical Director at time of admission: Dr Matilde Gleason Principle Hospice Diagnosis: Sepsis Diagnoses RELATED to the terminal prognosis: sepsis due to pseudomonas, Frequent falls, CAD with valvular replacement, HX Coronary Artery Bypass Graft, joint infection, Bilateral atelectasis, Acute CHF exacerbation, HTN , CAD,and DDD, metabolic encephalopathy Evaristo Javier Cohen is a 80 y. o. who was admitted to [facility]. The patient's principle diagnosis of Sepsis secondary to L hip Prosthetic joint infection has resulted in recurrent hospitalizations and surgical procedures, resulting in infections.  Functionally, the patient's Palliative Performance Scale has declined over a period of 2 months and is estimated at 10. . Objective information that support this patients limited prognosis includes: New Pleural Effusions, and increased previous pleural effusion. The patient/family chose comfort measures with the support of Hospice. The patient's principle diagnosis has resulted in weakness, debility, fatigue, shortness of breath, pain, anxiety, agitation, delirium, confusion. Functionally, the patient's Karnofsky and/or Palliative Performance Scale has declined over a period of days to weeks and is estimated at 10%. The patient is dependent on the following ADLs: pt is completely dependent for all ADLs. Objective information that support this patients limited prognosis includes:  HOSPICE DIAGNOSES  
 Active Symptoms: 1. Severe joint pain secondary to left hip prosthetic joint infection and sepsis 2. Terminal agitation and restlessness 3. Hyperthermia r/t sepsis 4. Excessive upper airway secretions 5. Shortness of breath with respiratory distress PLAN 1. Continue GIP  level of care for overwhelming symptoms, transitions of care, high risk for decline, this care cannot be provided in the home, the need for IV medications, frequent assessments are required by the medical team. 
2. Medications include the following:lorazepam 1mg IV q 4 hrs and q 15 min as needed, continue morphine 2 mg IV q 4 hrs and every 15 min as needed, continue robinul scheduled and Toradol as needed 3. Continue comfort care 4.  and SW to support family needs 5. Disposition: to routine level of care once symptoms resolve Prognosis estimated based on 10/22/19 clinical assessment is:  
[x] Hours to Days   
[] Days to Weeks   
[] Other: 
 
Communicated plan of care with: Hospice Case Manager; Hospice IDT; Care Team 
 
 GOALS OF CARE Patient/Medical POA stated Goal of Care: comfort care 
 
[x] I have reviewed and/or updated ACP information in the Advance Care Planning Navigator. This information is available in the [address] link in the patient's chart header. Primary Decision [facility] Agent):   Primary Decision Maker: Kim Goldberg - [phone] Primary Decision Maker: Keanu Gómez - [phone] Resuscitation Status: DNR If DNR is there a Durable DNR on file? : [x] Yes [] No (If no, complete Durable DNR) HISTORY History obtained from: chart, SN, CM, family, patient CHIEF COMPLAINT:   
The patient is:  
[] Verbal 
[] Nonverbal 
[x] Unresponsive HPI/SUBJECTIVE: 80 year old male doing poorly, poorly responsive, developed pseudomonas sepsis and is declining rapidly REVIEW OF SYSTEMS The following systems were: [] reviewed  [x] unable to be reviewed Positive ROS include: 
Constitutional: fatigue, weakness, in pain, short of breath Ears/nose/mouth/throat: increased airway secretions Respiratory:shortness of breath, wheezing Gastrointestinal:poor appetite, nausea, vomiting, abdominal pain, constipation, diarrhea Musculoskeletal:pain, deformities, swelling legs Neurologic:confusion, hallucinations, weakness Psychiatric:anxiety, feeling depressed, poor sleep Endocrine:  
  
Adult Non-Verbal Pain Assessment Score: 10/10 
  
Face 
[] 0   No particular expression or smile 
[] 1   Occasional grimace, tearing, frowning, wrinkled forehead 
[x] 2   Frequent grimace, tearing, frowning, wrinkled forehead 
  
Activity (movement) 
[] 0   Lying quietly, normal position 
[] 1   Seeking attention through movement or slow, cautious movement [x] 2   Restless, excessive activity and/or withdrawal reflexes 
  
Guarding 
[] 0   Lying quietly, no positioning of hands over areas of body 
[] 1   Splinting areas of the body, tense 
[x] 2   Rigid, stiff 
  
Physiology (vital signs) 
[] 0   Stable vital signs 
[] 1   Change in any of the following: SBP > 20mm Hg; HR > 20/minute [x] 2   Change in any of the following: SBP > 30mm Hg; HR > 25/minute 
  
Respiratory 
[] 0   Baseline RR/SpO2, compliant with ventilator 
[] 1   RR > 10 above baseline, or 5% drop SpO2, mild asynchrony with ventilator [x] 2   RR > 20 above baseline, or 10% drop SpO2, asynchrony with ventilator FUNCTIONAL ASSESSMENT Palliative Performance Scale (PPS): 10% PSYCHOSOCIAL/SPIRITUAL ASSESSMENT Active Problems: 
  Sepsis ([facility] [address] (10/19/2019) Past Medical History:  
Diagnosis Date  CAD (coronary artery disease)  DDD (degenerative disc disease), lumbar  Sciatica Past Surgical History:  
Procedure Laterality Date  HX CORONARY ARTERY BYPASS GRAFT Social History Tobacco Use  Smoking status: Never Smoker  Smokeless tobacco: Never Used Substance Use Topics  Alcohol use: Yes No family history on file. No Known Allergies Current Facility-Administered Medications Medication Dose Route Frequency  ketorolac (TORADOL) injection 30 mg  30 mg IntraVENous Q6H PRN  
 morphine injection 2 mg  2 mg IntraVENous Q3H PRN  
 LORazepam (ATIVAN) injection 1 mg  1 mg IntraVENous Q3H  
 glycopyrrolate (ROBINUL) injection 0.2 mg  0.2 mg IntraVENous Q3H  
 LORazepam (ATIVAN) injection 1 mg  1 mg IntraVENous Q15MIN PRN  
 haloperidol lactate (HALDOL) injection 2 mg  2 mg IntraVENous Q4H PRN  
 acetaminophen (TYLENOL) suppository 650 mg  650 mg Rectal Q4H PRN  
 scopolamine (TRANSDERM-SCOP) 1 mg over 3 days 1 Patch  1 Patch TransDERmal Q72H PRN  
 glycopyrrolate (ROBINUL) injection 0.2 mg  0.2 mg IntraVENous Q4H PRN  
 bisacodyl (DULCOLAX) suppository 10 mg  10 mg Rectal DAILY PRN  
 morphine injection 1 mg  1 mg IntraVENous Q15MIN PRN  
 sodium chloride (NS) flush 5 mL  5 mL InterCATHeter PRN PHYSICAL EXAM  
 
Wt Readings from Last 3 Encounters:  
10/19/19 135 lb 9.6 oz (61.5 kg) 09/09/19 136 lb 3.2 oz (61.8 kg) 07/02/15 140 lb 3.2 oz (63.6 kg) Visit Vitals /73 (BP 1 Location: Left arm, BP Patient Position: At rest) Pulse 91 Temp 97.7 °F (36.5 °C) Resp 20 SpO2 100% Supplemental O2  [x] Yes  [] NO Last bowel movement: PTA Currently this patient has: 
[x] Peripheral IV [] PICC  [] PORT [] ICD [x] Lucero Catheter [] NG Tube   [] PEG Tube   
[] Rectal Tube [] Drain 
[] Other:  
 
Constitutional: minimally responsive Eyes: pupils equal, anicteric ENMT: increased airway secretions, moist mucous membranes Cardiovascular: regular rhythm, distal pulses intact Respiratory: breathing not labored, symmetric Gastrointestinal: soft non-tender, +bowel sounds Musculoskeletal: no deformity, no tenderness to palpation Skin: warm, dry Neurologic:pt is/ not able to follow commands, pt is/ not moving all extremities Psychiatric: NA 
 
 
 
Pertinent Lab and or Imaging Tests: 
Lab Results Component Value Date/Time Sodium 142 10/15/2019 03:37 AM  
 Potassium 3.9 10/15/2019 03:37 AM  
 Chloride 109 (H) 10/15/2019 03:37 AM  
 CO2 30 10/15/2019 03:37 AM  
 Anion gap 3 (L) 10/15/2019 03:37 AM  
 Glucose 108 (H) 10/15/2019 03:37 AM  
 BUN 23 (H) 10/15/2019 03:37 AM  
 Creatinine 1.34 (H) 10/15/2019 03:37 AM  
 BUN/Creatinine ratio 17 10/15/2019 03:37 AM  
 GFR est AA >60 10/15/2019 03:37 AM  
 GFR est non-AA 50 (L) 10/15/2019 03:37 AM  
 Calcium 9.4 10/15/2019 03:37 AM  
 
Lab Results Component Value Date/Time  Protein, total 6.6 10/06/2019 05:29 PM  
 Albumin 2.7 (L) 10/06/2019 05:29 PM  
 
   
 
  
Delia Mahan MD

## 2019-10-22 NOTE — HOSPICE
Hospice Liaison Note: 
Staff called to request reorder of IV Toradol for patient who reportedly has temperature of 101. Toradol has been re-ordered as requested. Nurse spoke with both daughters as they were leaving the hospital this am. A family friend is sitting with patient, and daughters understand they can contact this liaison nurse for updates while they are away. Dar Gill RN, [facility] Hospice Nurse Liaison [phone] [place] [phone] Office

## 2019-10-22 NOTE — HOSPICE
This was an initial spiritual care visit to assess need. Family was not present but his neighbor and friend, Alessandra Hand, was present. Alessandra Hand was sitting with Kang Larson to allow his daughters time to go home and freshen up. Offered supportive listening as Marked shared some of their journey. Alessandra Hand had prayed and sang with him prior to my arrival. Alessandra Hand is engaged in anticipatory grief and thankful that he is able to support his friend and his daughters.

## 2019-10-22 NOTE — PROGRESS NOTES
Called to examine patient who has . No response to verbal and tactile stimuli. No respiratory effort. Absent heart sounds and pulses. Pupils fixed and dilated. Patient pronounced dead at 1215 PM hours. Amalia Cisneros MD 
 Hospitalist, Internal Medicine

## 2019-10-22 NOTE — HOSPICE
[facility] Those in Need 
[phone] Discharge/Death Nursing Note Patient Name: Juana Salinas YOB: 1927 Age: 80 y.o. Date of Death: 10/22/19 Admitted Date: 10/19/2019 Time of Death: 12:15 Facility of Care: [facility] Level of Care: [facility] Patient Room: [facility] Hospice Attending: Troy Mann MD 
Hospice Diagnosis: Sepsis [facility]) [A41.9] Death Pronouncement Pronouncement of death completed by:  
Called to examine patient who has . No response to verbal and tactile stimuli. No respiratory effort. Absent heart sounds and pulses. Pupils fixed and dilated. Patient pronounced dead at 26 PM hours. 
Jing Reynoso MD 
            Hospitalist, Internal Medicine Agency staff was present at the time of death The pt  within [facility] Room 615 The following were notified of the patient's death: Patients two daughters, hospice staff, hospital [place] [id number] Medications were disposed of per facility protocol Discharge Summary Discharge Reason: Death Summary of Care Provided: 
 
[x] Post mortem care provided by [facility] [x] Notification of  home by [facility] [] Referrals/Community resources provided:  
[x] Goals completed 
[] Durable Medical Equipment vendor notified Disciplines involved: [x] RN [x] SW [x]  [] PENALOZA [] Vol [] PT [] OT [] ST [] BC 
 
[x] IDT communication/notification Attending Physician, Edwin Mehta, Dr. Christoph Edmond, and Dr. Ita Draper ( left message with office staff) notified of death Bereaved Verify bereaved identified with name, address, telephone number and risk level Advance Care Planning 10/7/2019 Confirm Advance Directive None Dustin Glasgow RN, [facility] Hospice Nurse Liaison [phone] Mobile [phone] Office

## 2019-10-22 NOTE — PROGRESS NOTES
Attempted to administer morphine PRN dose and it was accidentally wasted over patient's bed. Wasted dose with DAVID Coats at the [facility]. Administer PRN dose due to labored breathing.

## 2019-10-23 ENCOUNTER — HOME CARE VISIT (OUTPATIENT)
Dept: HOSPICE | Facility: HOSPICE | Age: 84
End: 2019-10-23
Payer: MEDICARE

## 2019-10-23 LAB
BACTERIA SPEC CULT: ABNORMAL
BACTERIA SPEC CULT: ABNORMAL
GRAM STN SPEC: ABNORMAL
GRAM STN SPEC: ABNORMAL
SERVICE CMNT-IMP: ABNORMAL

## 2019-10-28 NOTE — DISCHARGE SUMMARY
Hospice Discharge Summary [facility] Those [facility] Date of Admission: 10/19/2019 Date of Discharge: 10/22/2019 Angela Keane is a 80y.o. year old who was admitted to [facility] at [facility] with a Hospice diagnosis of Sepsis ([facility] [address] [A41.9]. The patient's care was focused on comfort and the patient passed away on 10/22/2019. Disha Hamilton

## 2019-11-05 NOTE — DISCHARGE SUMMARY
Discharge Summary PATIENT ID: Jan Rene MRN: [id number] YOB: 1927 DATE OF ADMISSION: 10/6/2019  5:14 PM   
DATE OF DISCHARGE: 10/19/2019 PRIMARY CARE PROVIDER: David Skinner MD  
 
ATTENDING PHYSICIAN: Markus Ellington MD  
DISCHARGING PROVIDER: Markus Ellington MD   
To contact this individual call [phone] and ask the  to page. If unavailable ask to be transferred the Adult Hospitalist Department. CONSULTATIONS: IP CONSULT TO ORTHOPEDIC SURGERY 
IP CONSULT TO INFECTIOUS DISEASES PROCEDURES/SURGERIES: Procedure(s): 
I&D LEFT HIP AND RESECTION ARTHROPLASTY [address] COURSE:  
 
Admission Summary:  
Patient is a 80year old Male medical history significant for HTN , CAD,and DDD who presents to the Emergency Department accompanied by Daughter via EMS with chief complaint of fever. Patient complains of subjective fever, decreased oral intake  increased weakness after he was discharged from rehab 3 days. 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 He denies chills , Shortness of breath , chest pain , palpitations , leg swelling , syncope or near syncope , headaches , seizures or change in mentation , urinary or bowel complains. Interval history / Subjective:  
10/19/2019 : 
  
Agitated last pm. Better this am afeb; lab in am . No anticipated changes other than this writer feels best dispo is to SNF. , preferably locally. Now with coe indwelling as not able to void adequately,   
  
Assessment & Plan:  
  
Sepsis (POA) secondary to L hip prosthetic joint infection - + staph epi - X-ray showed left femoral neck fracture 9/3 
- s/p Left hip hemiarthroplasty 9/4/2019 
- CT of the hip with fluid collection. Concern that this may tract deeper. Ortho following for potential surgery - s/p OR 10/12 for hip arthroplasty, antibiotic spacer placement and I and D.  
- Micro growing Staph epi, Methicillin sensitive. - ID consulted, on Cefazolin, with 6 weeks until 11/23/19 per Dr. Bakari Oquendo.  
- no changes, afebrile, Lab Results Component Value Date/Time WBC 11.8 (H) 10/15/2019 03:37 AM  
 f/u expectantly . Home on IV abx as above anticipated Lab in am 10/19 [facility], recurrent high post void residuals after removing coe form post op status (in for 5 days initially post op), now with indwelling coe again since 10/17 - 10/19/2019 CT findings of bilateral atelectasis - febrile on admission, however with bilateral very small infiltrates in setting of new LE edema and new onset CHF. Possible that findings on CT are due to CHF. - in the setting of fever ,leucocytosis and CXR concerning new bibasilar infiltrates, originally treated for PNA 
- s/p levofloxacin for potential PNA 
- blood culture NGTD  
- DC IVF  
- Cont to monitor Acute CHF exacerbation. No known history of CHF. Pulmonary edema. NYHA class 3-4 on admission. H/o known CAD, ?ischemic. 
- Echo with mild to moderate CHF, EF 40-45% - On metoprolol 
- Holding lasix  
- Not on ACE/ARB due to CKD. - Family wishes to follow up with his cardiologist outpatient. - I and O and daily weights. - no s/s of hf 10/15/2019 - 10/19/2019 Bilateral bronchiectasis - possible  2/2 recurrent aspiration pneumonia  
- Not in acute exacerbation (no significant sputum production) - x-ray in 9/3 was unremarkable - No prior CT for comparison [facility] for coe as cont to retain urine. 700 cc's this   10/18/2019 am multiple straight caths over past 24 hours with high volumes. HTN  
- BP at goal  
- Cont home med's  
   
BP Readings from Last 1 Encounters:  
10/19/19 162/66 CAD with valvular replacement 
- Stable - Cont home ASA , BB Statins Increased Generalized weakness  
- likely from the above  
- PT/OT Metabolic encephalopathy - h/o dementia now with intermittent delirium - Add PM Seroquel  
- PRN IV haldol  
- stable mental 10/19/2019 Code status: FULL CODE.  
DVT prophylaxis: Lovenox PTA: home Baseline: Independent with walker Care Plan discussed with: Patient/Family Disposition: TBD  
  
 
 
 
 
DISCHARGE DIAGNOSES / PLAN:   
 
1.  as above - 
Dispo: in pt hospice DISCHARGE MEDICATIONS: 
Discharge Medication List as of 10/19/2019  7:14 PM  
  
 
 
Cognition CHRONIC MEDICAL DIAGNOSES: 
Problem List as of 10/19/2019 Date Reviewed: 10/7/2019 Codes Class Noted - Resolved Sepsis ([facility] ICD-10-CM: A41.9 ICD-9-CM: 038.9, 995.91  10/19/2019 - Present * (Principal) Pneumonia ICD-10-CM: J18.9 ICD-9-CM: 266  10/6/2019 - Present Hypertension ICD-10-CM: I10 
ICD-9-CM: 401.9  9/9/2019 - Present Fall from ground level ICD-10-CM: M58.09TS ICD-9-CM: E888.9  9/9/2019 - Present Fracture of unspecified part of neck of left femur, initial encounter for closed fracture ([facility] ICD-10-CM: Z17.479P ICD-9-CM: 820.8  9/3/2019 - Present Greater than 20  minutes were spent with the patient on counseling and coordination of care Signed:  
Marlin Melchor MD 
11/5/2019 5:30 AM

## 2019-11-11 LAB
BACTERIA SPEC CULT: NORMAL
SERVICE CMNT-IMP: NORMAL

## (undated) DEVICE — SOLUTION IRRIG 3000ML 0.9% SOD CHL FLX CONT 0797208] ICU MEDICAL INC]

## (undated) DEVICE — (D)PREP SKN CHLRAPRP APPL 26ML -- CONVERT TO ITEM 371833

## (undated) DEVICE — SUTURE MCRYL SZ 2-0 L36IN ABSRB UD L36MM CT-1 1/2 CIR Y945H

## (undated) DEVICE — PREP KIT PEEL PTCH POVIDONE IOD

## (undated) DEVICE — SUTURE STRATAFIX SPRL SZ 1 L14IN ABSRB VLT L48CM CTX 1/2 SXPD2B405

## (undated) DEVICE — SCRUB DRY SURG EZ SCRUB BRUSH PREOPERATIVE GRN

## (undated) DEVICE — STRAP,POSITIONING,KNEE/BODY,FOAM,4X60": Brand: MEDLINE

## (undated) DEVICE — SUTURE MCRYL SZ 3-0 L27IN ABSRB UD L24MM PS-1 3/8 CIR PRIM Y936H

## (undated) DEVICE — APPLICATOR BNDG 1MM ADH PREMIERPRO EXOFIN

## (undated) DEVICE — DRAPE,U/ SHT,SPLIT,PLAS,STERIL: Brand: MEDLINE

## (undated) DEVICE — SPONGE LAP 18X18IN STRL -- 5/PK

## (undated) DEVICE — SUTURE MCRYL SZ 3-0 L27IN ABSRB UD L19MM PS-2 3/8 CIR PRIM Y427H

## (undated) DEVICE — SOLUTION IRRIG 1000ML H2O STRL BLT

## (undated) DEVICE — SUTURE ETHBND EXCEL SZ 2 L30IN NONABSORBABLE GRN L40MM V-37 MX69G

## (undated) DEVICE — SUTURE PDS II SZ 2-0 L36IN ABSRB VLT CT L40MM 1/2 CIR TAPR Z357H

## (undated) DEVICE — YANKAUER,OPEN TIP,W/O VENT,STERILE: Brand: MEDLINE INDUSTRIES, INC.

## (undated) DEVICE — HANDPIECE SET WITH BONE CLEANING TIP AND SUCTION TUBE: Brand: INTERPULSE

## (undated) DEVICE — SUTURE VCRL SZ 1 L36IN ABSRB UD L36MM CT-1 1/2 CIR J947H

## (undated) DEVICE — SYRINGE MED 20ML STD CLR PLAS LUERLOCK TIP N CTRL DISP

## (undated) DEVICE — SUTURE VCRL SZ 0 L36IN ABSRB VLT L40MM CT 1/2 CIR J358H

## (undated) DEVICE — T4 HOOD

## (undated) DEVICE — STERILE POLYISOPRENE POWDER-FREE SURGICAL GLOVES WITH EMOLLIENT COATING: Brand: PROTEXIS

## (undated) DEVICE — REM POLYHESIVE ADULT PATIENT RETURN ELECTRODE: Brand: VALLEYLAB

## (undated) DEVICE — Device

## (undated) DEVICE — STERILE POLYISOPRENE POWDER-FREE SURGICAL GLOVES: Brand: PROTEXIS

## (undated) DEVICE — TIP SUCT CRV REG REDI

## (undated) DEVICE — GRADUATED BOWL: Brand: DEVON

## (undated) DEVICE — PADDING CAST SPEC 6INX4YD COT --

## (undated) DEVICE — INFECTION CONTROL KIT SYS

## (undated) DEVICE — DRSG AQUACEL SURG 3.5 X 10IN -- CONVERT TO ITEM 370183

## (undated) DEVICE — NEEDLE HYPO 21GA L1.5IN INTRAMUSCULAR S STL LATCH BVL UP

## (undated) DEVICE — 3M™ IOBAN™ 2 ANTIMICROBIAL INCISE DRAPE 6651EZ: Brand: IOBAN™ 2

## (undated) DEVICE — SPONGE GZ W4XL4IN COT RADPQ HIGHLY ABSRB

## (undated) DEVICE — SYR LR LCK 1ML GRAD NSAF 30ML --

## (undated) DEVICE — SUTURE VCRL 1 L27IN ABSRB CT BRAID COAT UD J281H

## (undated) DEVICE — SUTURE ETHLN SZ 2-0 L18IN NONABSORBABLE BLK L26MM FS 3/8 664G

## (undated) DEVICE — COVER,MAYO STAND,STERILE: Brand: MEDLINE

## (undated) DEVICE — SUTURE PDS II SZ 1 L54IN ABSRB VLT L65MM TP-1 1/2 CIR Z879G

## (undated) DEVICE — Z INVALID SUPPLIER PI BUR SURG HD L4MM DIA4MM RND FLUT REPL CARB LINVATEC

## (undated) DEVICE — SUTURE VCRL SZ 2-0 L27IN ABSRB UD L36MM CP-1 1/2 CIR REV J266H

## (undated) DEVICE — PAD BD MATTRESS 73X32 IN STD CONVOLUTED FOAM LTX FREE

## (undated) DEVICE — BLADE SAW W11XL77.5MM THK1.23MM CUT THK1.17MM S STL RECIP

## (undated) DEVICE — HEWSON SUTURE RETRIEVER: Brand: HEWSON SUTURE RETRIEVER

## (undated) DEVICE — CONTAINER,SPECIMEN,3OZ,OR STRL: Brand: MEDLINE

## (undated) DEVICE — MARKER,SKIN,WI/RULER AND LABELS: Brand: MEDLINE

## (undated) DEVICE — PATIENT PROTECTIVE PAD FOR IMP UNIVERSAL LATERAL HIP POSITIONER (ULP) (6/CASE): Brand: PATIENT PROTECTIVE PAD

## (undated) DEVICE — TRAY CATH 16F URIN MTR LTX -- CONVERT TO ITEM 363111

## (undated) DEVICE — 3M™ STERI-DRAPE™ 2 INCISE DRAPE 2050: Brand: STERI-DRAPE™

## (undated) DEVICE — BLADE ELECTRODE: Brand: EDGE

## (undated) DEVICE — GOWN,SIRUS,NONRNF,SETINSLV,2XL,18/CS: Brand: MEDLINE

## (undated) DEVICE — 4-PORT MANIFOLD: Brand: NEPTUNE 2

## (undated) DEVICE — SUTURE VCRL SZ 2-0 L36IN ABSRB UD L40MM CT 1/2 CIR J957H

## (undated) DEVICE — BLADE SAW W098XL354IN THK0047IN CUT THK0047IN SAG

## (undated) DEVICE — GOWN,PREVENTION PLUS,XLN/2XL,ST,22/CS: Brand: MEDLINE

## (undated) DEVICE — PREP SKN PREVAIL 40ML APPL --

## (undated) DEVICE — (D)SOL MEDC ALC ISO 70% 16OZ -- CONVERT TO ITEM 364515

## (undated) DEVICE — SUTURE STRATAFIX SYMMETRIC PDS + SZ 1 L18IN ABSRB VLT L48MM SXPP1A400